# Patient Record
Sex: MALE | Race: WHITE | Employment: OTHER | URBAN - METROPOLITAN AREA
[De-identification: names, ages, dates, MRNs, and addresses within clinical notes are randomized per-mention and may not be internally consistent; named-entity substitution may affect disease eponyms.]

---

## 2017-06-17 ENCOUNTER — ANESTHESIA EVENT (OUTPATIENT)
Dept: SURGERY | Age: 67
End: 2017-06-17
Payer: MEDICARE

## 2017-06-19 ENCOUNTER — ANESTHESIA (OUTPATIENT)
Dept: SURGERY | Age: 67
End: 2017-06-19
Payer: MEDICARE

## 2017-06-19 ENCOUNTER — HOSPITAL ENCOUNTER (OUTPATIENT)
Age: 67
Setting detail: OUTPATIENT SURGERY
Discharge: HOME | End: 2017-06-19
Attending: PODIATRIST | Admitting: PODIATRIST
Payer: MEDICARE

## 2017-06-19 VITALS
TEMPERATURE: 98 F | HEIGHT: 70 IN | DIASTOLIC BLOOD PRESSURE: 80 MMHG | BODY MASS INDEX: 35.93 KG/M2 | HEART RATE: 53 BPM | WEIGHT: 251 LBS | SYSTOLIC BLOOD PRESSURE: 161 MMHG | RESPIRATION RATE: 16 BRPM | OXYGEN SATURATION: 98 %

## 2017-06-19 PROCEDURE — 76060000034 HC ANESTHESIA 1.5 TO 2 HR: Performed by: PODIATRIST

## 2017-06-19 PROCEDURE — 77030020754 HC CUF TRNQT 2BLA STRY -B: Performed by: PODIATRIST

## 2017-06-19 PROCEDURE — 77030031139 HC SUT VCRL2 J&J -A: Performed by: PODIATRIST

## 2017-06-19 PROCEDURE — 76210000016 HC OR PH I REC 1 TO 1.5 HR: Performed by: PODIATRIST

## 2017-06-19 PROCEDURE — 77030018836 HC SOL IRR NACL ICUM -A: Performed by: PODIATRIST

## 2017-06-19 PROCEDURE — 77030002916 HC SUT ETHLN J&J -A: Performed by: PODIATRIST

## 2017-06-19 PROCEDURE — 74011250636 HC RX REV CODE- 250/636

## 2017-06-19 PROCEDURE — 77030006773 HC BLD SAW OSC BRSM -A: Performed by: PODIATRIST

## 2017-06-19 PROCEDURE — 77030011640 HC PAD GRND REM COVD -A: Performed by: PODIATRIST

## 2017-06-19 PROCEDURE — 76010000153 HC OR TIME 1.5 TO 2 HR: Performed by: PODIATRIST

## 2017-06-19 PROCEDURE — 74011000250 HC RX REV CODE- 250: Performed by: PODIATRIST

## 2017-06-19 PROCEDURE — 76210000020 HC REC RM PH II FIRST 0.5 HR: Performed by: PODIATRIST

## 2017-06-19 PROCEDURE — 77030020269 HC MISC IMPL: Performed by: PODIATRIST

## 2017-06-19 PROCEDURE — 77030006784 HC BLD SAW OSC MCRA -B: Performed by: PODIATRIST

## 2017-06-19 RX ORDER — CEFAZOLIN SODIUM IN 0.9 % NACL 2 G/100 ML
PLASTIC BAG, INJECTION (ML) INTRAVENOUS AS NEEDED
Status: DISCONTINUED | OUTPATIENT
Start: 2017-06-19 | End: 2017-06-19 | Stop reason: HOSPADM

## 2017-06-19 RX ORDER — SODIUM CHLORIDE 9 MG/ML
25 INJECTION, SOLUTION INTRAVENOUS CONTINUOUS
Status: DISCONTINUED | OUTPATIENT
Start: 2017-06-19 | End: 2017-06-19 | Stop reason: HOSPADM

## 2017-06-19 RX ORDER — SODIUM CHLORIDE 0.9 % (FLUSH) 0.9 %
5-10 SYRINGE (ML) INJECTION AS NEEDED
Status: DISCONTINUED | OUTPATIENT
Start: 2017-06-19 | End: 2017-06-19 | Stop reason: HOSPADM

## 2017-06-19 RX ORDER — PROPOFOL 10 MG/ML
INJECTION, EMULSION INTRAVENOUS AS NEEDED
Status: DISCONTINUED | OUTPATIENT
Start: 2017-06-19 | End: 2017-06-19 | Stop reason: HOSPADM

## 2017-06-19 RX ORDER — FENTANYL CITRATE 50 UG/ML
INJECTION, SOLUTION INTRAMUSCULAR; INTRAVENOUS AS NEEDED
Status: DISCONTINUED | OUTPATIENT
Start: 2017-06-19 | End: 2017-06-19 | Stop reason: HOSPADM

## 2017-06-19 RX ORDER — KETOROLAC TROMETHAMINE 30 MG/ML
INJECTION, SOLUTION INTRAMUSCULAR; INTRAVENOUS AS NEEDED
Status: DISCONTINUED | OUTPATIENT
Start: 2017-06-19 | End: 2017-06-19 | Stop reason: HOSPADM

## 2017-06-19 RX ORDER — MIDAZOLAM HYDROCHLORIDE 1 MG/ML
0.5 INJECTION, SOLUTION INTRAMUSCULAR; INTRAVENOUS
Status: DISCONTINUED | OUTPATIENT
Start: 2017-06-19 | End: 2017-06-19 | Stop reason: HOSPADM

## 2017-06-19 RX ORDER — SODIUM CHLORIDE 0.9 % (FLUSH) 0.9 %
5-10 SYRINGE (ML) INJECTION EVERY 8 HOURS
Status: DISCONTINUED | OUTPATIENT
Start: 2017-06-19 | End: 2017-06-19 | Stop reason: HOSPADM

## 2017-06-19 RX ORDER — MIDAZOLAM HYDROCHLORIDE 1 MG/ML
INJECTION, SOLUTION INTRAMUSCULAR; INTRAVENOUS AS NEEDED
Status: DISCONTINUED | OUTPATIENT
Start: 2017-06-19 | End: 2017-06-19 | Stop reason: HOSPADM

## 2017-06-19 RX ORDER — CEFAZOLIN SODIUM IN 0.9 % NACL 2 G/50 ML
2 INTRAVENOUS SOLUTION, PIGGYBACK (ML) INTRAVENOUS ONCE
Status: DISCONTINUED | OUTPATIENT
Start: 2017-06-19 | End: 2017-06-19 | Stop reason: HOSPADM

## 2017-06-19 RX ORDER — MIDAZOLAM HYDROCHLORIDE 1 MG/ML
1 INJECTION, SOLUTION INTRAMUSCULAR; INTRAVENOUS AS NEEDED
Status: DISCONTINUED | OUTPATIENT
Start: 2017-06-19 | End: 2017-06-19 | Stop reason: HOSPADM

## 2017-06-19 RX ORDER — FENTANYL CITRATE 50 UG/ML
50 INJECTION, SOLUTION INTRAMUSCULAR; INTRAVENOUS AS NEEDED
Status: DISCONTINUED | OUTPATIENT
Start: 2017-06-19 | End: 2017-06-19 | Stop reason: HOSPADM

## 2017-06-19 RX ORDER — SODIUM CHLORIDE 9 MG/ML
1000 INJECTION, SOLUTION INTRAVENOUS CONTINUOUS
Status: DISCONTINUED | OUTPATIENT
Start: 2017-06-19 | End: 2017-06-19 | Stop reason: HOSPADM

## 2017-06-19 RX ORDER — SODIUM CHLORIDE, SODIUM LACTATE, POTASSIUM CHLORIDE, CALCIUM CHLORIDE 600; 310; 30; 20 MG/100ML; MG/100ML; MG/100ML; MG/100ML
25 INJECTION, SOLUTION INTRAVENOUS CONTINUOUS
Status: DISCONTINUED | OUTPATIENT
Start: 2017-06-19 | End: 2017-06-19 | Stop reason: HOSPADM

## 2017-06-19 RX ORDER — DEXTROSE, SODIUM CHLORIDE, SODIUM LACTATE, POTASSIUM CHLORIDE, AND CALCIUM CHLORIDE 5; .6; .31; .03; .02 G/100ML; G/100ML; G/100ML; G/100ML; G/100ML
25 INJECTION, SOLUTION INTRAVENOUS CONTINUOUS
Status: DISCONTINUED | OUTPATIENT
Start: 2017-06-19 | End: 2017-06-19 | Stop reason: HOSPADM

## 2017-06-19 RX ORDER — FENTANYL CITRATE 50 UG/ML
25 INJECTION, SOLUTION INTRAMUSCULAR; INTRAVENOUS
Status: DISCONTINUED | OUTPATIENT
Start: 2017-06-19 | End: 2017-06-19 | Stop reason: HOSPADM

## 2017-06-19 RX ORDER — SODIUM CHLORIDE, SODIUM LACTATE, POTASSIUM CHLORIDE, CALCIUM CHLORIDE 600; 310; 30; 20 MG/100ML; MG/100ML; MG/100ML; MG/100ML
INJECTION, SOLUTION INTRAVENOUS
Status: DISCONTINUED | OUTPATIENT
Start: 2017-06-19 | End: 2017-06-19 | Stop reason: HOSPADM

## 2017-06-19 RX ORDER — LIDOCAINE HYDROCHLORIDE 10 MG/ML
0.1 INJECTION, SOLUTION EPIDURAL; INFILTRATION; INTRACAUDAL; PERINEURAL AS NEEDED
Status: DISCONTINUED | OUTPATIENT
Start: 2017-06-19 | End: 2017-06-19 | Stop reason: HOSPADM

## 2017-06-19 RX ORDER — PROPOFOL 10 MG/ML
INJECTION, EMULSION INTRAVENOUS
Status: DISCONTINUED | OUTPATIENT
Start: 2017-06-19 | End: 2017-06-19 | Stop reason: HOSPADM

## 2017-06-19 RX ORDER — ONDANSETRON 2 MG/ML
4 INJECTION INTRAMUSCULAR; INTRAVENOUS AS NEEDED
Status: DISCONTINUED | OUTPATIENT
Start: 2017-06-19 | End: 2017-06-19 | Stop reason: HOSPADM

## 2017-06-19 RX ORDER — DIPHENHYDRAMINE HYDROCHLORIDE 50 MG/ML
12.5 INJECTION, SOLUTION INTRAMUSCULAR; INTRAVENOUS AS NEEDED
Status: DISCONTINUED | OUTPATIENT
Start: 2017-06-19 | End: 2017-06-19 | Stop reason: HOSPADM

## 2017-06-19 RX ORDER — HYDROMORPHONE HYDROCHLORIDE 1 MG/ML
0.2 INJECTION, SOLUTION INTRAMUSCULAR; INTRAVENOUS; SUBCUTANEOUS
Status: DISCONTINUED | OUTPATIENT
Start: 2017-06-19 | End: 2017-06-19 | Stop reason: HOSPADM

## 2017-06-19 RX ORDER — MORPHINE SULFATE 10 MG/ML
2 INJECTION, SOLUTION INTRAMUSCULAR; INTRAVENOUS
Status: DISCONTINUED | OUTPATIENT
Start: 2017-06-19 | End: 2017-06-19 | Stop reason: HOSPADM

## 2017-06-19 RX ADMIN — PROPOFOL 20 MG: 10 INJECTION, EMULSION INTRAVENOUS at 10:08

## 2017-06-19 RX ADMIN — PROPOFOL 10 MG: 10 INJECTION, EMULSION INTRAVENOUS at 10:20

## 2017-06-19 RX ADMIN — PROPOFOL 10 MG: 10 INJECTION, EMULSION INTRAVENOUS at 10:27

## 2017-06-19 RX ADMIN — PROPOFOL 10 MG: 10 INJECTION, EMULSION INTRAVENOUS at 10:12

## 2017-06-19 RX ADMIN — SODIUM CHLORIDE, SODIUM LACTATE, POTASSIUM CHLORIDE, CALCIUM CHLORIDE: 600; 310; 30; 20 INJECTION, SOLUTION INTRAVENOUS at 09:45

## 2017-06-19 RX ADMIN — PROPOFOL 40 MCG/KG/MIN: 10 INJECTION, EMULSION INTRAVENOUS at 10:11

## 2017-06-19 RX ADMIN — MIDAZOLAM HYDROCHLORIDE 1 MG: 1 INJECTION, SOLUTION INTRAMUSCULAR; INTRAVENOUS at 10:22

## 2017-06-19 RX ADMIN — FENTANYL CITRATE 50 MCG: 50 INJECTION, SOLUTION INTRAMUSCULAR; INTRAVENOUS at 10:03

## 2017-06-19 RX ADMIN — PROPOFOL 10 MG: 10 INJECTION, EMULSION INTRAVENOUS at 10:30

## 2017-06-19 RX ADMIN — PROPOFOL 10 MG: 10 INJECTION, EMULSION INTRAVENOUS at 10:16

## 2017-06-19 RX ADMIN — Medication 2 G: at 10:14

## 2017-06-19 RX ADMIN — MIDAZOLAM HYDROCHLORIDE 2 MG: 1 INJECTION, SOLUTION INTRAMUSCULAR; INTRAVENOUS at 10:03

## 2017-06-19 RX ADMIN — PROPOFOL 10 MG: 10 INJECTION, EMULSION INTRAVENOUS at 10:22

## 2017-06-19 RX ADMIN — PROPOFOL 10 MG: 10 INJECTION, EMULSION INTRAVENOUS at 10:25

## 2017-06-19 RX ADMIN — KETOROLAC TROMETHAMINE 30 MG: 30 INJECTION, SOLUTION INTRAMUSCULAR; INTRAVENOUS at 11:17

## 2017-06-19 RX ADMIN — MIDAZOLAM HYDROCHLORIDE 0.5 MG: 1 INJECTION, SOLUTION INTRAMUSCULAR; INTRAVENOUS at 10:46

## 2017-06-19 RX ADMIN — FENTANYL CITRATE 25 MCG: 50 INJECTION, SOLUTION INTRAMUSCULAR; INTRAVENOUS at 10:46

## 2017-06-19 NOTE — DISCHARGE SUMMARY
1. Rest, Ice, Elevate Right Foot. 2. Dispense surgical shoe and wear while ambulating right foot  3. Complete Range of Motion Exercises at the right 1st digit by moving the digit up and down 3 times per day for 5 minutes  4. Leave dressing dry, clean and intact Right Foot, contact Dr. Markus Rosa if the dressing becomes wet  5. Weight bear as tolerated in surgical shoe right foot. 6. Follow all instructions on handout provided by Dr. Markus Rosa.

## 2017-06-19 NOTE — DISCHARGE INSTRUCTIONS
1. Rest, Ice, Elevate Right Foot. 2. Dispense surgical shoe and wear while ambulating right foot  3. Complete Range of Motion Exercises at the right 1st digit by moving the digit up and down 3 times per day for 5 minutes  4. Leave dressing dry, clean and intact Right Foot, contact Dr. Loretta Robertson if the dressing becomes wet  5. Weight bear as tolerated in surgical shoe right foot. 6. Follow all instructions on handout provided by Dr. Loretta Robertson.                      After general anesthesia or intravenous sedation, for 24 hours or while taking prescription Narcotics:  · Limit your activities  · Do not drive and operate hazardous machinery  · Do not make important personal or business decisions  · Do  not drink alcoholic beverages  · If you have not urinated within 8 hours after discharge, please contact your surgeon on call.     Report the following to your surgeon:  · Excessive pain, swelling, redness or odor of or around the surgical area  · Temperature over 100.5  · Nausea and vomiting lasting longer than 4 hours or if unable to take medications  · Any signs of decreased circulation or nerve impairment to extremity: change in color, persistent  numbness, tingling, coldness or increase pain  · Any questions

## 2017-06-19 NOTE — H&P
Surgery History and Physcial    Subjective:      Jacqueline Branch is a 73 y.o. male who presents for evaluation of right foot pain. He has failed conservative therapy including shoe gear modification, injections, NSAIDs, ect. He presents for surgical correction    Patient Active Problem List    Diagnosis Date Noted    Bilateral groin pain 04/19/2016     Past Medical History:   Diagnosis Date    Arrhythmia 2006    ATRIAL FIBRILLATION    Arthritis     Bilateral groin pain 4/19/2016    GERD (gastroesophageal reflux disease)     Hiatal hernia     Hypertension     Ill-defined condition     KIDNEY STONE    Joint pain     Multiple stiff joints     Muscle ache     Sinus problem     Thyroid disease       Past Surgical History:   Procedure Laterality Date    COLONOSCOPY N/A 10/4/2016    COLONOSCOPY performed by [facility]ee [facility] at [facility] ENDOSCOPY    HX APPENDECTOMY      HX CATARACT REMOVAL      BILATERAL CATARACT REMOVAL    HX GI      ESOPHAGEAL DILITATION (SEVERAL)    HX HERNIA REPAIR Left     HX ORTHOPAEDIC      left wrist and shoulder, right knee and shoulder      Social History   Substance Use Topics    Smoking status: Never Smoker    Smokeless tobacco: Never Used    Alcohol use No      Family History   Problem Relation Age of Onset    Cancer Mother     Heart Disease Mother     Cancer Father     Diabetes Brother     Diabetes Brother       Prior to Admission medications    Medication Sig Start Date End Date Taking? Authorizing Provider   FLAXSEED OIL (OMEGA 3 PO) Take 1 Tab by mouth daily. Yes Historical Provider   amLODIPine (NORVASC) 10 mg tablet 10 mg nightly. 1/22/16  Yes Historical Provider   ibuprofen (MOTRIN) 800 mg tablet  4/13/16  Yes Historical Provider   SYNTHROID 175 mcg tablet 175 mcg daily. 4/6/16  Yes Historical Provider   TOPROL XL 50 mg XL tablet 50 mg daily. 1/22/16  Yes Historical Provider   pantoprazole (PROTONIX) 40 mg tablet 40 mg daily as needed.  3/11/16  Yes Historical Provider   CETIRIZINE HCL (ZYRTEC PO) Take 10 mg by mouth daily as needed. Yes Historical Provider   ASPIRIN (ASPIR-81 PO) Take  by mouth. Yes Historical Provider     Allergies   Allergen Reactions    Codeine Other (comments)         Review of Systems   Constitutional: Negative. Negative for activity change, appetite change, chills, diaphoresis, fatigue and fever. HENT: Negative. Negative for postnasal drip, sinus pressure, sneezing and sore throat. Eyes: Negative. Negative for pain, discharge, redness and itching. Respiratory: Negative. Negative for cough, choking, shortness of breath and wheezing. Cardiovascular: Negative. Negative for chest pain, palpitations and leg swelling. Endocrine: Negative. Genitourinary: Negative for decreased urine volume, discharge, flank pain, frequency and urgency. Musculoskeletal: Positive for arthralgias, joint swelling and neck pain. Allergic/Immunologic: Negative. Neurological: Negative. Negative for tremors, syncope, speech difficulty and weakness. Hematological: Negative. Negative for adenopathy. Does not bruise/bleed easily. Psychiatric/Behavioral: Negative. Negative for agitation, behavioral problems, confusion and hallucinations. Objective:     Visit Vitals    /90 (BP 1 Location: Left arm, BP Patient Position: At rest)    Pulse (!) 56    Temp 97.9 °F (36.6 °C)    Resp 16    Ht 5' 10\" (1.778 m)    Wt 113.9 kg (251 lb)    SpO2 97%    BMI 36.01 kg/m2       Physical Exam   Constitutional: He is oriented to person, place, and time. He appears well-developed and well-nourished. No distress. HENT:   Head: Normocephalic and atraumatic. Right Ear: External ear normal.   Left Ear: External ear normal.   Eyes: Conjunctivae and EOM are normal. Pupils are equal, round, and reactive to light. Right eye exhibits no discharge. Left eye exhibits no discharge. No scleral icterus. Neck: Normal range of motion. Neck supple.  No JVD present. No tracheal deviation present. No thyromegaly present. Cardiovascular: Normal rate, regular rhythm, normal heart sounds and intact distal pulses. Exam reveals no friction rub. Pulmonary/Chest: Effort normal and breath sounds normal.   Abdominal: Soft. Bowel sounds are normal.   Musculoskeletal: Normal range of motion. He exhibits tenderness and deformity. He exhibits no edema. Neurological: He is alert and oriented to person, place, and time. He has normal reflexes. He displays normal reflexes. No cranial nerve deficit. He exhibits normal muscle tone. Coordination normal.   Skin: Skin is warm and dry. No rash noted. He is not diaphoretic. No erythema. No pallor. Psychiatric: He has a normal mood and affect. His behavior is normal. Judgment and thought content normal.     Foot and Ankle Focused Exam  Vasc: DP and PT pulses palpable BLE. CFT less than 3 seconds BLE digits. Hair growth noted BLE. Skin temp warm to warm from proximal to distal BLE. Neuro: Protective sensation intact at 10/10 sites with monofilament. Vibratory sensation intact at the level of the hallux. Proprioception intact at the 1st MTP BLE. No ankle clonus BLE. Sharp/Dull sensation intact BLE. Derm: No open lesions. Webspaces 1-4 dry clean intact BLE. Increased erythema along the medial and dorsal 1st MTP right foot. Ortho: Range of motion decreased at the level of the 1st MTP with ROM of the hallux on the 1st metatarsal right foot. Pain on palpation and crepitus at the 1st MTP right. Muscle strenght 5/5 for all lower extremity muscle groups    Imaging:  images and reports reviewed    Lab Review:  No results found for this or any previous visit (from the past 24 hour(s)). Assessment:     1. Hallux Rigidus, Right foot  2. HAV, Right Foot  3. Secondary OA, Right foot  4. Pain right foot. Plan:     1.  I recommend proceeding with chilectomy of the 1st metatarsal right foot with application of 1st MTP joint replacement. 2. Discussed aspects of surgical intervention, methods, risks including by not limited to infection, bleeding, hematoma, and perforation of the intestines or solid organs, and the risks of general anesthetic. The patient understands the risks; any and all questions were answered to the patient's satisfaction.     Signed By: Jaswinder Rodriguez DPM     June 19, 2017

## 2017-06-19 NOTE — BRIEF OP NOTE
BRIEF OPERATIVE NOTE    Date of Procedure: 6/19/2017   Preoperative Diagnosis: HALLUX RIGIDIUS RIGHT FOOT  Postoperative Diagnosis: HALLUX RIGIDIUS RIGHT FOOT    Procedure(s):  HALLUX RIGIDUS CORRECTION WITH CHEILECTOMY AND INSERTION OF IMPLANT RIGHT FOOT   Surgeon(s) and Role:     * Sebastien Jackson DPM - Primary         Assistant Staff:       Surgical Staff:  Circ-1: Moriah Murrieta RN  Scrub RN-1: Elgin Lechuga RN  Scrub RN-Relief: Samy Huerta  Scrub RN - Intern: Nathalie Maldonado RN  Event Time In   Incision Start 1033   Incision Close 1125     Anesthesia: Other   Estimated Blood Loss: 10mL  Specimens: * No specimens in log *   Findings: Patient tolerated procedure and anesthesia well. Transport from OR to PACU with VSS and NVSI to both feet. Complications: None  Implants:   Implant Name Type Inv.  Item Serial No.  Lot No. LRB No. Used Action   1ST MPJ IMPLANT W/ FABIOLA     N/A   [id number] Right 1 Implanted

## 2017-06-19 NOTE — ANESTHESIA POSTPROCEDURE EVALUATION
Post-Anesthesia Evaluation and Assessment    Patient: Karla Mittal MRN: [id number]  SSN: [id number]    YOB: 1950  Age: 73 y.o. Sex: male       Cardiovascular Function/Vital Signs  Visit Vitals    /78    Pulse (!) 50    Temp 36.6 °C (97.9 °F)    Resp 14    Ht 5' 10\" (1.778 m)    Wt 113.9 kg (251 lb)    SpO2 97%    BMI 36.01 kg/m2       Patient is status post MAC anesthesia for Procedure(s):  HALLUX RIGIDUS CORRECTION WITH CHEILECTOMY AND INSERTION OF IMPLANT RIGHT FOOT . Nausea/Vomiting: None    Postoperative hydration reviewed and adequate. Pain:  Pain Scale 1: Numeric (0 - 10) (06/19/17 0923)  Pain Intensity 1: 6 (06/19/17 0923)   Managed    Neurological Status:   Neuro (WDL): Within Defined Limits (06/19/17 0930)   At baseline    Mental Status and Level of Consciousness: Arousable    Pulmonary Status:   O2 Device: Nasal cannula (06/19/17 1135)   Adequate oxygenation and airway patent    Complications related to anesthesia: None    Post-anesthesia assessment completed.  No concerns    Signed By: Miriam Banerjee MD     June 19, 2017

## 2017-06-19 NOTE — ANESTHESIA PREPROCEDURE EVALUATION
Anesthetic History   No history of anesthetic complications            Review of Systems / Medical History  Patient summary reviewed, nursing notes reviewed and pertinent labs reviewed    Pulmonary  Within defined limits                 Neuro/Psych   Within defined limits           Cardiovascular    Hypertension        Dysrhythmias : atrial fibrillation           GI/Hepatic/Renal     GERD           Endo/Other      Hypothyroidism  Obesity     Other Findings            Physical Exam    Airway  Mallampati: III  TM Distance: 4 - 6 cm  Neck ROM: normal range of motion   Mouth opening: Normal     Cardiovascular  Regular rate and rhythm,  S1 and S2 normal,  no murmur, click, rub, or gallop             Dental  No notable dental hx       Pulmonary  Breath sounds clear to auscultation               Abdominal  GI exam deferred       Other Findings            Anesthetic Plan    ASA: 3  Anesthesia type: MAC - ankle block          Induction: Intravenous  Anesthetic plan and risks discussed with: Patient

## 2017-06-24 NOTE — OP NOTES
[address] [facility] [address] [place] [address], [address]   OP NOTE       Name:  Katja Mina   MR#:  [id number]   :  1950   Account #:  [de-identified]    Surgery Date:  2017   Date of Adm:  2017       SURGEON: Mika Eng. Bishop Mikey III, LAST    ASSISTANT: None. PREOPERATIVE DIAGNOSES    1. Hallux abductovalgus deformity of the right foot. 2. Hallux rigidus deformity of the right foot. POSTOPERATIVE DIAGNOSES    1. Hallux abductovalgus deformity of the right foot. 2. Hallux rigidus deformity of the right foot. PROCEDURES PERFORMED: Correction of hallux abductovalgus   and hallux rigidus deformity of the right foot with a first MPJ   arthroplasty silicone implant. ANESTHESIA: MAC local sedation with a total of 20 mL of 50:50 mix   1% lidocaine plain and 0.5% Marcaine plain utilized about the surgical   site. HEMOSTASIS: Pneumatic ankle tourniquet utilized at 250 mmHg for a   total of 57 minutes. MATERIALS: Consisted of an [id number] Reference Toe total silicone   joint implant size 4, 3-0 Vicryl and 3-0 nylon. INJECTABLES: None. SPECIMENS REMOVED/PATHOLOGY: None. COMPLICATIONS: None. CONDITION AFTER PROCEDURE: Stable. ESTIMATED BLOOD LOSS:      INDICATIONS FOR PROCEDURE: This patient is well-known to me   on an outpatient basis, who has undergone multiple conservative   treatments for hallux rigidus and hallux abductovalgus including   shoe gear modification, padding, anti-inflammatories, injections, etc.   The patient has elected for surgical intervention. The procedures were   explained in detail. All questions answered. No guarantees were made. Preoperative labs and H and P were reviewed. The patient was   cleared by the anesthesia department for surgical intervention. OPERATIVE TECHNIQUE: Under mild sedation, the patient was   brought into the operating room, placed on the operating table in   supine position.  Critical pause was taken to identify the correct patient,   correct surgical site, and the correct procedure. All were in agreement   in the room. Next, pneumatic ankle tourniquet was placed about the   patient's right ankle with Webril used as interspace between the skin   and the ankle tourniquet. Next, the right lower extremity was scrubbed,   prepped and draped in the usual aseptic manner. My attention was then directed to the patient's right first ray, where   utilizing the above-mentioned local anesthetic, I injected the above   local anesthetic in a Baez block fashion about the right foot. Once   local anesthesia was verified, I then utilized a #15 blade to make a 4   cm linear longitudinal incision dorsal to the first metatarsal head,   continuing over the metatarsophalangeal joint and the dorsal aspect of   the proximal phalanx of the right hallux. I continued my dissection with   a combination of sharp and blunt, paying close attention to retract or   cauterize all neurovascular structures as necessary. I then visualized   the extensor hallucis longus and brevis tendon. I dissected these   sharply and bluntly and retracted it laterally. I then performed a   longitudinal capsulotomy across metatarsophalangeal joint, freeing all   soft tissue structures from the head of the first metatarsal and neck as   well as the base of the proximal phalanx of the right hallux until   complete visualization of the metatarsal head and base of the proximal   phalanx was visualized. I then used a McGlamry elevator to free   additional soft tissue adhesions as well as free the sesamoid   apparatus. I then flushed the area with copious amounts of normal   sterile saline. I then utilizing a power oscillating saw to resect the distal   head just proximal to the cartilage of the first metatarsal head. I passed   this to the back table. Smoothed all jagged edges.  I then removed the   base of the proximal phalanx and passed to the back table, flushed the area with copious amounts of normal sterile saline. Next, I inserted a   K-wire, paying close attention to remain parallel to the metatarsal shaft   and perpendicular to the weightbearing surface. This was utilizing   intraoperative fluoroscopy, both AP and lateral views. Once the K-wire   was inserted, I then placed the reference total reamer across the K-  wire. I reamed to the stop level of the reamer. I then flushed the area   with copious amounts of normal sterile saline. I then inserted a K-wire   into the base of the proximal phalanx using intraoperative fluoroscopy   as guidance and AP and lateral views. I then placed the reamer across   the K-wire and reamed the proximal phalanx to the automatic stop. I   then flushed the area with copious amounts of normal sterile saline. I   then used the grommet guide and placed it into the distal aspect of the   first metatarsal and the grommet into the proximal aspect of the base   of the proximal phalanx, right hallux. I then placed the size 4 sides   within the joint to complete range of motion, checked radiographs AP   medial oblique and lateral views. Adequate range of motion was   presented. I then flushed the area with copious amounts of normal   sterile saline. I then placed the actual size 4 implant within the first   metatarsophalangeal joint. The range of motion was verified. Intraoperative fluoroscopy noted adequate seating, proper seating of   the grommets as well as proper dorsiflexion, plantarflexion of the first   metatarsophalangeal joint at the hallux level. I then reapproximated the   capsular tissues utilizing 3-0 Vicryl in a simple interrupted suture   technique. Skin and subcutaneous tissue reapproximated utilizing 3-0   Vicryl in simple interrupted suture technique. Skin incision   was reapproximated utilizing 3-0 nylon in a simple interrupted suture   technique.  Pneumatic ankle tourniquet deflated at this time with prompt   hyperemic response noted to all digits on the right lower extremity. I   then dressed the incision sites, Betadine gel, Adaptic, 4 x 4's, Kerlix   and placed the patient in a postoperative shoe. The patient tolerated the procedure and anesthesia well. The patient   was transported from the OR to the PACU with vital signs stable and   neurovascular status intact to both feet. Following a period of   postoperative monitoring, the patient will be discharged home with the   following written and oral instructions:   1. Rest, ice, elevate the right lower extremity. 2. The patient can weight bear as tolerated in surgical shoe on the right   lower extremity. 3. The patient will leave the dressing dry, clean and intact. If dressing   becomes wet or soiled, please contact me soon as possible. 4. The patient is being prescribed Augmentin 875 mg to be taken twice   daily for infection prophylaxis as well as Percocet 5/325 as needed for   postoperative discomfort. 5. The patient is to follow up with me within 1 week for followup care. Contact me sooner if any problems should arise.         Bre Jang, XOCHITL, LAST FARIAS   D:  06/23/2017   17:00   T:  06/23/2017   20:36   Job #:  [id number]

## 2017-11-27 ENCOUNTER — HOSPITAL ENCOUNTER (OUTPATIENT)
Dept: GENERAL RADIOLOGY | Age: 67
Discharge: HOME | End: 2017-11-27
Attending: SPECIALIST
Payer: MEDICARE

## 2017-11-27 DIAGNOSIS — M25.552 LEFT HIP PAIN: ICD-10-CM

## 2017-11-27 DIAGNOSIS — M16.12 ARTHRITIS OF LEFT HIP: ICD-10-CM

## 2017-11-27 PROCEDURE — 74011636320 HC RX REV CODE- 636/320: Performed by: RADIOLOGY

## 2017-11-27 PROCEDURE — 74011000250 HC RX REV CODE- 250: Performed by: RADIOLOGY

## 2017-11-27 PROCEDURE — 74011250636 HC RX REV CODE- 250/636: Performed by: RADIOLOGY

## 2017-11-27 PROCEDURE — 20610 DRAIN/INJ JOINT/BURSA W/O US: CPT

## 2017-11-27 RX ORDER — BUPIVACAINE HYDROCHLORIDE 5 MG/ML
5 INJECTION, SOLUTION EPIDURAL; INTRACAUDAL
Status: COMPLETED | OUTPATIENT
Start: 2017-11-27 | End: 2017-11-27

## 2017-11-27 RX ORDER — LIDOCAINE HYDROCHLORIDE 10 MG/ML
10 INJECTION INFILTRATION; PERINEURAL
Status: COMPLETED | OUTPATIENT
Start: 2017-11-27 | End: 2017-11-27

## 2017-11-27 RX ORDER — TRIAMCINOLONE ACETONIDE 40 MG/ML
40 INJECTION, SUSPENSION INTRA-ARTICULAR; INTRAMUSCULAR
Status: COMPLETED | OUTPATIENT
Start: 2017-11-27 | End: 2017-11-27

## 2017-11-27 RX ADMIN — IOHEXOL 20 ML: 180 INJECTION INTRAVENOUS at 13:14

## 2017-11-27 RX ADMIN — LIDOCAINE HYDROCHLORIDE 10 ML: 10 INJECTION, SOLUTION INFILTRATION; PERINEURAL at 13:15

## 2017-11-27 RX ADMIN — TRIAMCINOLONE ACETONIDE 40 MG: 40 INJECTION, SUSPENSION INTRA-ARTICULAR; INTRAMUSCULAR at 13:14

## 2017-11-27 RX ADMIN — BUPIVACAINE HYDROCHLORIDE 25 MG: 5 INJECTION, SOLUTION EPIDURAL; INTRACAUDAL at 13:15

## 2018-03-19 RX ORDER — METOPROLOL TARTRATE 50 MG/1
50 TABLET ORAL 2 TIMES DAILY
COMMUNITY
End: 2021-09-29

## 2018-03-20 ENCOUNTER — HOSPITAL ENCOUNTER (OUTPATIENT)
Age: 68
Setting detail: OUTPATIENT SURGERY
Discharge: HOME | End: 2018-03-20
Attending: INTERNAL MEDICINE | Admitting: INTERNAL MEDICINE
Payer: MEDICARE

## 2018-03-20 ENCOUNTER — ANESTHESIA EVENT (OUTPATIENT)
Dept: ENDOSCOPY | Age: 68
End: 2018-03-20
Payer: MEDICARE

## 2018-03-20 ENCOUNTER — ANESTHESIA (OUTPATIENT)
Dept: ENDOSCOPY | Age: 68
End: 2018-03-20
Payer: MEDICARE

## 2018-03-20 VITALS
BODY MASS INDEX: 36.22 KG/M2 | OXYGEN SATURATION: 97 % | TEMPERATURE: 98.4 F | SYSTOLIC BLOOD PRESSURE: 144 MMHG | RESPIRATION RATE: 13 BRPM | WEIGHT: 253 LBS | HEART RATE: 58 BPM | HEIGHT: 70 IN | DIASTOLIC BLOOD PRESSURE: 69 MMHG

## 2018-03-20 PROCEDURE — 76040000019: Performed by: INTERNAL MEDICINE

## 2018-03-20 PROCEDURE — 76060000031 HC ANESTHESIA FIRST 0.5 HR: Performed by: INTERNAL MEDICINE

## 2018-03-20 PROCEDURE — 74011000250 HC RX REV CODE- 250

## 2018-03-20 PROCEDURE — 74011250636 HC RX REV CODE- 250/636

## 2018-03-20 RX ORDER — LIDOCAINE HYDROCHLORIDE 20 MG/ML
INJECTION, SOLUTION EPIDURAL; INFILTRATION; INTRACAUDAL; PERINEURAL AS NEEDED
Status: DISCONTINUED | OUTPATIENT
Start: 2018-03-20 | End: 2018-03-20 | Stop reason: HOSPADM

## 2018-03-20 RX ORDER — EPINEPHRINE 0.1 MG/ML
1 INJECTION INTRACARDIAC; INTRAVENOUS
Status: DISCONTINUED | OUTPATIENT
Start: 2018-03-20 | End: 2018-03-20 | Stop reason: HOSPADM

## 2018-03-20 RX ORDER — DEXTROMETHORPHAN/PSEUDOEPHED 2.5-7.5/.8
1.2 DROPS ORAL
Status: DISCONTINUED | OUTPATIENT
Start: 2018-03-20 | End: 2018-03-20 | Stop reason: HOSPADM

## 2018-03-20 RX ORDER — SODIUM CHLORIDE 9 MG/ML
50 INJECTION, SOLUTION INTRAVENOUS CONTINUOUS
Status: DISCONTINUED | OUTPATIENT
Start: 2018-03-20 | End: 2018-03-20 | Stop reason: HOSPADM

## 2018-03-20 RX ORDER — SODIUM CHLORIDE 0.9 % (FLUSH) 0.9 %
5-10 SYRINGE (ML) INJECTION EVERY 8 HOURS
Status: DISCONTINUED | OUTPATIENT
Start: 2018-03-20 | End: 2018-03-20 | Stop reason: HOSPADM

## 2018-03-20 RX ORDER — FLUMAZENIL 0.1 MG/ML
0.2 INJECTION INTRAVENOUS
Status: DISCONTINUED | OUTPATIENT
Start: 2018-03-20 | End: 2018-03-20 | Stop reason: HOSPADM

## 2018-03-20 RX ORDER — SODIUM CHLORIDE 0.9 % (FLUSH) 0.9 %
5-10 SYRINGE (ML) INJECTION AS NEEDED
Status: DISCONTINUED | OUTPATIENT
Start: 2018-03-20 | End: 2018-03-20 | Stop reason: HOSPADM

## 2018-03-20 RX ORDER — PROPOFOL 10 MG/ML
INJECTION, EMULSION INTRAVENOUS AS NEEDED
Status: DISCONTINUED | OUTPATIENT
Start: 2018-03-20 | End: 2018-03-20 | Stop reason: HOSPADM

## 2018-03-20 RX ORDER — MIDAZOLAM HYDROCHLORIDE 1 MG/ML
.25-5 INJECTION, SOLUTION INTRAMUSCULAR; INTRAVENOUS
Status: DISCONTINUED | OUTPATIENT
Start: 2018-03-20 | End: 2018-03-20 | Stop reason: HOSPADM

## 2018-03-20 RX ORDER — NALOXONE HYDROCHLORIDE 0.4 MG/ML
0.4 INJECTION, SOLUTION INTRAMUSCULAR; INTRAVENOUS; SUBCUTANEOUS
Status: DISCONTINUED | OUTPATIENT
Start: 2018-03-20 | End: 2018-03-20 | Stop reason: HOSPADM

## 2018-03-20 RX ORDER — FENTANYL CITRATE 50 UG/ML
100 INJECTION, SOLUTION INTRAMUSCULAR; INTRAVENOUS
Status: DISCONTINUED | OUTPATIENT
Start: 2018-03-20 | End: 2018-03-20 | Stop reason: HOSPADM

## 2018-03-20 RX ORDER — ATROPINE SULFATE 0.1 MG/ML
0.5 INJECTION INTRAVENOUS
Status: DISCONTINUED | OUTPATIENT
Start: 2018-03-20 | End: 2018-03-20 | Stop reason: HOSPADM

## 2018-03-20 RX ORDER — SODIUM CHLORIDE 9 MG/ML
INJECTION, SOLUTION INTRAVENOUS
Status: DISCONTINUED | OUTPATIENT
Start: 2018-03-20 | End: 2018-03-20 | Stop reason: HOSPADM

## 2018-03-20 RX ADMIN — PROPOFOL 50 MG: 10 INJECTION, EMULSION INTRAVENOUS at 14:59

## 2018-03-20 RX ADMIN — PROPOFOL 50 MG: 10 INJECTION, EMULSION INTRAVENOUS at 15:01

## 2018-03-20 RX ADMIN — LIDOCAINE HYDROCHLORIDE 80 MG: 20 INJECTION, SOLUTION EPIDURAL; INFILTRATION; INTRACAUDAL; PERINEURAL at 15:10

## 2018-03-20 RX ADMIN — SODIUM CHLORIDE: 9 INJECTION, SOLUTION INTRAVENOUS at 15:01

## 2018-03-20 RX ADMIN — PROPOFOL 50 MG: 10 INJECTION, EMULSION INTRAVENOUS at 15:03

## 2018-03-20 RX ADMIN — LIDOCAINE HYDROCHLORIDE 80 MG: 20 INJECTION, SOLUTION EPIDURAL; INFILTRATION; INTRACAUDAL; PERINEURAL at 14:59

## 2018-03-20 NOTE — ANESTHESIA POSTPROCEDURE EVALUATION
Post-Anesthesia Evaluation and Assessment    Patient: Anita Weldon MRN: [id number]  SSN: [id number]    YOB: 1950  Age: 78 y.o. Sex: male       Cardiovascular Function/Vital Signs  Visit Vitals    /80    Pulse 80    Temp 36.9 °C (98.4 °F)    Resp 14    Ht 5' 10\" (1.778 m)    Wt 114.8 kg (253 lb)    SpO2 99%    BMI 36.3 kg/m2       Patient is status post MAC anesthesia for Procedure(s):  ESOPHAGOGASTRODUODENOSCOPY (EGD) WITH DIL   ESOPHAGEAL DILATION. Nausea/Vomiting: None    Postoperative hydration reviewed and adequate. Pain:  Pain Scale 1: Numeric (0 - 10) (03/20/18 1525)  Pain Intensity 1: 0 (03/20/18 1525)   Managed    Neurological Status: At baseline    Mental Status and Level of Consciousness: Arousable    Pulmonary Status:   O2 Device: Nasal cannula (03/20/18 1519)   Adequate oxygenation and airway patent    Complications related to anesthesia: None    Post-anesthesia assessment completed.  No concerns    Signed By: Candi Garber MD     March 20, 2018

## 2018-03-20 NOTE — ANESTHESIA PREPROCEDURE EVALUATION
Anesthetic History   No history of anesthetic complications            Review of Systems / Medical History  Patient summary reviewed, nursing notes reviewed and pertinent labs reviewed    Pulmonary  Within defined limits                 Neuro/Psych   Within defined limits           Cardiovascular    Hypertension        Dysrhythmias : atrial fibrillation           GI/Hepatic/Renal     GERD           Endo/Other      Hypothyroidism  Arthritis     Other Findings            Physical Exam    Airway  Mallampati: II  TM Distance: > 6 cm  Neck ROM: normal range of motion   Mouth opening: Normal     Cardiovascular  Regular rate and rhythm,  S1 and S2 normal,  no murmur, click, rub, or gallop             Dental  No notable dental hx       Pulmonary  Breath sounds clear to auscultation               Abdominal  GI exam deferred       Other Findings            Anesthetic Plan    ASA: 3  Anesthesia type: MAC          Induction: Intravenous  Anesthetic plan and risks discussed with: Patient

## 2018-03-20 NOTE — PROCEDURES
[address]                     :  Eugenia Randolph MD    Referring Provider: Javier Perez MD    Sedation:  MAC anesthesia Propofol    Prior to the procedure its objectives, risks, consequences and alternatives were discussed with the patient who then elected to proceed. The patient had the opportunity to ask questions and those questions were answered. A physical exam was performed. The heart, lungs, and mental status were examined prior to the procedure and found to be satisfactory for conscious sedation and for the procedure. Conscious sedation was initiated by the physician. Continuous pulse oximetry and blood pressure monitoring were used throughout the procedure. After appropriate pharyngeal anesthesia, the endoscope was passed into the esophagus without difficulty. The proximal esophagus is normal. In the distal esophagus there is a typical Schatzkis ring and Grade 1 esophagitis. The scope goes through without difficulty. The fundus, body, antrum, pylorus, bulb and postbulbar area are unremarkable. On slow withdrawal of the scope, no abnormality was noted. Retroflexion revealed a normal cardia and fundus. The scope was then withdrawn and he was dilated with a 50 and 54 [place] Jimenez dilator without complications and will be discharged later today. Specimen none    Complications: None. EBL:  None.     Eugenia Randolph MD  3/20/2018  3:19 PM

## 2018-03-20 NOTE — ROUTINE PROCESS
Jake Dempsey  1950  [id number]    Situation:  Verbal report received from: Thomas Porter  Procedure: Procedure(s):  ESOPHAGOGASTRODUODENOSCOPY (EGD) WITH DIL   ESOPHAGEAL DILATION    Background:    Preoperative diagnosis: DYSPHAGIA   Postoperative diagnosis: Schatzki's Ring  Reflux    :  Dr. Eloisa Chand  Assistant(s): Endoscopy Technician-1: Kalina Gold IV  Endoscopy RN-1: Fatimah Barnes RN    Specimens: * No specimens in log *  H. Pylori  no    Assessment:  Intra-procedure medications     Anesthesia gave intra-procedure sedation and medications, see anesthesia flow sheet yes    Intravenous fluids: NS@ KVO     Vital signs stable     Abdominal assessment: round and soft     Recommendation:  Discharge patient per MD order  Family or Friend   Permission to share finding with family or friend yes

## 2018-03-20 NOTE — H&P
[address]  174 [facility], [address] [place] [address] Frannie Her is a  78 y.o.  male who presents with recurrent dysphagia.  .        Past Medical History:   Diagnosis Date    Arrhythmia 2006    ATRIAL FIBRILLATION - when thyroid \"went out\" - no problem since thyroid is under control    Arthritis     Bilateral groin pain 4/19/2016    GERD (gastroesophageal reflux disease)     Hiatal hernia     Hypertension     Ill-defined condition     KIDNEY STONE    Joint pain     Multiple stiff joints     Muscle ache     Sinus problem     Thyroid disease     radiation tablet     Past Surgical History:   Procedure Laterality Date    COLONOSCOPY N/A 10/4/2016    COLONOSCOPY performed by Mildred Cabrera MD at [address] HX APPENDECTOMY      HX CATARACT REMOVAL      BILATERAL CATARACT REMOVAL    HX GI      ESOPHAGEAL DILITATION (SEVERAL)    HX HERNIA REPAIR Left     HX ORTHOPAEDIC      left wrist (bone taken out of thumb and a tendon transplanted) and left shoulder - bone spur removed, right knee - meniscus removed and right shoulder x 2- bone spurs x2 and removed part of collar bone     Allergies   Allergen Reactions    Codeine Other (comments)     Current Facility-Administered Medications   Medication Dose Route Frequency Provider Last Rate Last Dose    0.9% sodium chloride infusion  50 mL/hr IntraVENous CONTINUOUS Mildred Cabrera MD        sodium chloride (NS) flush 5-10 mL  5-10 mL IntraVENous Q8H Mildred Cabrera MD        sodium chloride (NS) flush 5-10 mL  5-10 mL IntraVENous PRN Mildred Cabrera MD        midazolam (VERSED) injection 0.25-5 mg  0.25-5 mg IntraVENous Claudia Cabrera MD        fentaNYL citrate (PF) injection 100 mcg  100 mcg IntraVENous Claudia Cabrera MD        naloxone [facility]) injection 0.4 mg  0.4 mg IntraVENous Claudia Cabrera MD        flumazenil (ROMAZICON) 0.1 mg/mL injection 0.2 mg  0.2 mg IntraVENous Claudia Verduzco MD        [facility]) 56PH/9.2NR oral drops 80 mg  1.2 mL Oral Claudia Verduzco MD        atropine injection 0.5 mg  0.5 mg IntraVENous ONCE PRN iLliana Verduzco MD        EPINEPHrine (ADRENALIN) 0.1 mg/mL syringe 1 mg  1 mg Endoscopically ONCE PRN Liliana Verduzco MD         Facility-Administered Medications Ordered in Other Encounters   Medication Dose Route Frequency Provider Last Rate Last Dose    0.9% sodium chloride infusion   IntraVENous CONTINUOUS Renee Aguero CRNA           Visit Vitals    /82    Pulse 62    Temp 98.4 °F (36.9 °C)    Resp 17    Ht 5' 10\" (1.778 m)    Wt 114.8 kg (253 lb)    SpO2 96%    BMI 36.3 kg/m2           PHYSICAL EXAM:  General: WD, WN. Alert, cooperative, no acute distress    HEENT: NC, Atraumatic. PERRLA, EOMI. Anicteric sclerae. Mallampati score 2  Lungs:  CTA Bilaterally. No Wheezing/Rhonchi/Rales. Heart:  Regular  rhythm,  No murmur (), No Rubs, No Gallops  Abdomen: Soft, Non distended, Non tender.  +Bowel sounds, no HSM  Extremities: No c/c/e  Neurologic:  CN 2-12 gi, Alert and oriented X 3. No acute neurological distress   Psych:   Good insight. Not anxious nor agitated. Plan:   Endoscopic procedure with MAC.     Liliana Verduzco MD  3/20/2018  3:05 PM

## 2018-03-20 NOTE — IP AVS SNAPSHOT
[address] 
[phone] Patient: Georges Freeman MRN: [id number] About your hospitalization You were admitted on:  2018 You last received care in the:  [address] [place] [address] ENDOSCOPY You were discharged on:  2018 Why you were hospitalized Your primary diagnosis was:  Not on File Follow-up Information None Discharge Orders None A check rell indicates which time of day the medication should be taken. My Medications CONTINUE taking these medications Instructions Each Dose to Equal  
 Morning Noon Evening Bedtime  
 amLODIPine 10 mg tablet Commonly known as:  Erin [place] Your last dose was: Your next dose is: Take 10 mg by mouth nightly. 10 mg  
    
   
   
   
  
 ASPIR-81 PO Your last dose was: Your next dose is: Take 81 mg by mouth daily. 81 mg  
    
   
   
   
  
 ibuprofen 800 mg tablet Commonly known as:  MOTRIN Your last dose was: Your next dose is: Take 800 mg by mouth two (2) times daily as needed. 800 mg  
    
   
   
   
  
 metoprolol tartrate 50 mg tablet Commonly known as:  LOPRESSOR Your last dose was: Your next dose is: Take 50 mg by mouth two (2) times a day. 50 mg  
    
   
   
   
  
 pantoprazole 40 mg tablet Commonly known as:  PROTONIX Your last dose was: Your next dose is: Take 40 mg by mouth daily as needed. 40 mg SYNTHROID 175 mcg tablet Generic drug:  levothyroxine Your last dose was: Your next dose is: Take 175 mcg by mouth daily. Takes at 2am.  
 175 mcg ZYRTEC PO Your last dose was: Your next dose is: Take 10 mg by mouth daily as needed.   
 10 mg  
    
   
   
   
  
  
  
  
 Discharge Instructions [address] S[place] 
[id number] 
1950 EGD DISCHARGE INSTRUCTIONS Discomfort: 
Sore throat- throat lozenges or warm salt water gargle 
redness at IV site- apply warm compress to area; if redness or soreness persist- contact your physician Gaseous discomfort- walking, belching will help relieve any discomfort You may not operate a vehicle for 12 hours You may not engage in an occupation involving machinery or appliances for rest of today You may not drink alcoholic beverages for at least 12 hours Avoid making any critical decisions for at least 24 hour DIET You may have anything by mouth- do not eat or drink for two hours. You may eat and drink after you leave. You may resume your regular diet  however -  remember your colon is empty and a heavy meal will produce gas. Avoid these foods:  vegetables, fried / greasy foods, carbonated drinks ACTIVITY You may resume your normal daily activities Spend the remainder of the day resting -  avoid any strenuous activity. CALL M.D. ANY SIGN OF Increasing pain, nausea, vomiting Abdominal distension (swelling) New increased bleeding (oral or rectal) Fever (chills) Pain in chest area Bloody discharge from nose or mouth Shortness of breath Follow-up Instructions: 
 Call Harrie Phoenix for any questions or problems. Telephone # [phone] Continue same medications. Impression:  Schatzki's Ring Reflux Hilda Trivedi MD 
3/20/2018  3:21 PM 
 
 
  
  
  
Introducing [facility]! [facility] introduces [employer] patient portal. Now you can access parts of your medical record, email your doctor's office, and request medication refills online. 1. In your internet browser, go to [email or web address] 2. Click on the First Time User? Click Here link in the Sign In box.  You will see the New Member Sign Up page. 3. Enter your [employer] Access Code exactly as it appears below. You will not need to use this code after youve completed the sign-up process. If you do not sign up before the expiration date, you must request a new code. · [employer] Access Code: [email or web address] Expires: 6/18/2018  3:30 PM 
 
4. Enter the last four digits of your Social Security Number (xxxx) and Date of Birth (mm/dd/yyyy) as indicated and click Submit. You will be taken to the next sign-up page. 5. Create a [employer] ID. This will be your [employer] login ID and cannot be changed, so think of one that is secure and easy to remember. 6. Create a [employer] password. You can change your password at any time. 7. Enter your Password Reset Question and Answer. This can be used at a later time if you forget your password. 8. Enter your e-mail address. You will receive e-mail notification when new information is available in [address] 9. Click Sign Up. You can now view and download portions of your medical record. 10. Click the Download Summary menu link to download a portable copy of your medical information. If you have questions, please visit the Frequently Asked Questions section of the [employer] website. Remember, [employer] is NOT to be used for urgent needs. For medical emergencies, dial 911. Now available from your iPhone and Android! Providers Seen During Your Hospitalization Provider Specialty Primary office phone Chiqui Mcneil MD Gastroenterology [phone] Your Primary Care Physician (PCP) Primary Care Physician Office Phone Office Fax Jole Ashby [phone] You are allergic to the following Allergen Reactions Codeine Other (comments) Recent Documentation Height Weight BMI Smoking Status 1.778 m 114.8 kg 36.3 kg/m2 Never Smoker Emergency Contacts Name Discharge Info Relation Home Work Mobile [address] CAREGIVER [3] Spouse [3] [phone] Patient Belongings The following personal items are in your possession at time of discharge: 
  Dental Appliances: None  Visual Aid: None Please provide this summary of care documentation to your next provider. Signatures-by signing, you are acknowledging that this After Visit Summary has been reviewed with you and you have received a copy. Patient Signature:  ____________________________________________________________ Date:  ____________________________________________________________  
  
Mary Keto Provider Signature:  ____________________________________________________________ Date:  ____________________________________________________________

## 2018-03-20 NOTE — DISCHARGE INSTRUCTIONS
[address]  500 [facility]  [id number]  1950    EGD DISCHARGE INSTRUCTIONS    Discomfort:  Sore throat- throat lozenges or warm salt water gargle  redness at IV site- apply warm compress to area; if redness or soreness persist- contact your physician  Gaseous discomfort- walking, belching will help relieve any discomfort  You may not operate a vehicle for 12 hours  You may not engage in an occupation involving machinery or appliances for rest of today  You may not drink alcoholic beverages for at least 12 hours  Avoid making any critical decisions for at least 24 hour  DIET  You may have anything by mouth- do not eat or drink for two hours. You may eat and drink after you leave. You may resume your regular diet - however -  remember your colon is empty and a heavy meal will produce gas. Avoid these foods:  vegetables, fried / greasy foods, carbonated drinks        ACTIVITY  You may resume your normal daily activities   Spend the remainder of the day resting -  avoid any strenuous activity. CALL M.D. ANY SIGN OF   Increasing pain, nausea, vomiting  Abdominal distension (swelling)  New increased bleeding (oral or rectal)  Fever (chills)  Pain in chest area  Bloody discharge from nose or mouth  Shortness of breath    Follow-up Instructions:   Call Yojana Chan for any questions or problems. Telephone # [phone]   Continue same medications.     Impression:  Schatzki's Ring  Reflux    Anu Landrum MD  3/20/2018  3:21 PM

## 2019-03-29 ENCOUNTER — HOSPITAL ENCOUNTER (OUTPATIENT)
Dept: GENERAL RADIOLOGY | Age: 69
Discharge: HOME | End: 2019-03-29
Attending: SPECIALIST
Payer: MEDICARE

## 2019-03-29 DIAGNOSIS — M16.12 PRIMARY OSTEOARTHRITIS OF LEFT HIP: ICD-10-CM

## 2019-03-29 PROCEDURE — 74011000250 HC RX REV CODE- 250: Performed by: RADIOLOGY

## 2019-03-29 PROCEDURE — 74011250636 HC RX REV CODE- 250/636

## 2019-03-29 PROCEDURE — 74011250636 HC RX REV CODE- 250/636: Performed by: RADIOLOGY

## 2019-03-29 PROCEDURE — 74011636320 HC RX REV CODE- 636/320: Performed by: RADIOLOGY

## 2019-03-29 PROCEDURE — 20610 DRAIN/INJ JOINT/BURSA W/O US: CPT

## 2019-03-29 RX ORDER — TRIAMCINOLONE ACETONIDE 40 MG/ML
40 INJECTION, SUSPENSION INTRA-ARTICULAR; INTRAMUSCULAR
Status: COMPLETED | OUTPATIENT
Start: 2019-03-29 | End: 2019-03-29

## 2019-03-29 RX ORDER — BUPIVACAINE HYDROCHLORIDE 5 MG/ML
5 INJECTION, SOLUTION EPIDURAL; INTRACAUDAL
Status: COMPLETED | OUTPATIENT
Start: 2019-03-29 | End: 2019-03-29

## 2019-03-29 RX ORDER — LIDOCAINE HYDROCHLORIDE 10 MG/ML
INJECTION, SOLUTION EPIDURAL; INFILTRATION; INTRACAUDAL; PERINEURAL
Status: COMPLETED
Start: 2019-03-29 | End: 2019-03-29

## 2019-03-29 RX ADMIN — TRIAMCINOLONE ACETONIDE 40 MG: 40 INJECTION, SUSPENSION INTRA-ARTICULAR; INTRAMUSCULAR at 13:29

## 2019-03-29 RX ADMIN — BUPIVACAINE HYDROCHLORIDE 25 MG: 5 INJECTION, SOLUTION EPIDURAL; INTRACAUDAL; PERINEURAL at 13:28

## 2019-03-29 RX ADMIN — IOHEXOL 20 ML: 180 INJECTION INTRAVENOUS at 13:28

## 2019-03-29 RX ADMIN — LIDOCAINE HYDROCHLORIDE 30 ML: 10 INJECTION, SOLUTION EPIDURAL; INFILTRATION; INTRACAUDAL; PERINEURAL at 13:28

## 2021-09-29 RX ORDER — NEBIVOLOL 10 MG/1
10 TABLET ORAL DAILY
COMMUNITY

## 2021-10-01 ENCOUNTER — TRANSCRIBE ORDER (OUTPATIENT)
Dept: REGISTRATION | Age: 71
End: 2021-10-01

## 2021-10-01 ENCOUNTER — HOSPITAL ENCOUNTER (OUTPATIENT)
Dept: PREADMISSION TESTING | Age: 71
Discharge: HOME | End: 2021-10-01
Payer: MEDICARE

## 2021-10-01 DIAGNOSIS — Z01.812 PRE-PROCEDURE LAB EXAM: Primary | ICD-10-CM

## 2021-10-01 DIAGNOSIS — Z01.812 PRE-PROCEDURE LAB EXAM: ICD-10-CM

## 2021-10-01 PROCEDURE — U0005 INFEC AGEN DETEC AMPLI PROBE: HCPCS

## 2021-10-03 LAB
SARS-COV-2, XPLCVT: NOT DETECTED
SOURCE, COVRS: NORMAL

## 2021-10-06 ENCOUNTER — ANESTHESIA (OUTPATIENT)
Dept: ENDOSCOPY | Age: 71
End: 2021-10-06
Payer: MEDICARE

## 2021-10-06 ENCOUNTER — ANESTHESIA EVENT (OUTPATIENT)
Dept: ENDOSCOPY | Age: 71
End: 2021-10-06
Payer: MEDICARE

## 2021-10-06 ENCOUNTER — HOSPITAL ENCOUNTER (OUTPATIENT)
Age: 71
Setting detail: OUTPATIENT SURGERY
Discharge: HOME | End: 2021-10-06
Attending: INTERNAL MEDICINE | Admitting: INTERNAL MEDICINE
Payer: MEDICARE

## 2021-10-06 VITALS
OXYGEN SATURATION: 96 % | DIASTOLIC BLOOD PRESSURE: 78 MMHG | HEART RATE: 58 BPM | TEMPERATURE: 97.8 F | HEIGHT: 70 IN | BODY MASS INDEX: 36.3 KG/M2 | SYSTOLIC BLOOD PRESSURE: 134 MMHG | RESPIRATION RATE: 14 BRPM

## 2021-10-06 PROCEDURE — 77030013996 HC SNR POLYP ENDOSC OCOA -B: Performed by: INTERNAL MEDICINE

## 2021-10-06 PROCEDURE — 88305 TISSUE EXAM BY PATHOLOGIST: CPT

## 2021-10-06 PROCEDURE — 74011250636 HC RX REV CODE- 250/636: Performed by: NURSE ANESTHETIST, CERTIFIED REGISTERED

## 2021-10-06 PROCEDURE — C1713 ANCHOR/SCREW BN/BN,TIS/BN: HCPCS | Performed by: INTERNAL MEDICINE

## 2021-10-06 PROCEDURE — 76060000032 HC ANESTHESIA 0.5 TO 1 HR: Performed by: INTERNAL MEDICINE

## 2021-10-06 PROCEDURE — 74011000250 HC RX REV CODE- 250: Performed by: NURSE ANESTHETIST, CERTIFIED REGISTERED

## 2021-10-06 PROCEDURE — 74011000250 HC RX REV CODE- 250: Performed by: ANESTHESIOLOGY

## 2021-10-06 PROCEDURE — 77030040684 HC NDL ENDOSC NEEDLEMASTR OCOA -B: Performed by: INTERNAL MEDICINE

## 2021-10-06 PROCEDURE — 77030013992 HC SNR POLYP ENDOSC BSC -B: Performed by: INTERNAL MEDICINE

## 2021-10-06 PROCEDURE — 76040000007: Performed by: INTERNAL MEDICINE

## 2021-10-06 PROCEDURE — 2709999900 HC NON-CHARGEABLE SUPPLY: Performed by: INTERNAL MEDICINE

## 2021-10-06 RX ORDER — NALOXONE HYDROCHLORIDE 0.4 MG/ML
0.4 INJECTION, SOLUTION INTRAMUSCULAR; INTRAVENOUS; SUBCUTANEOUS
Status: DISCONTINUED | OUTPATIENT
Start: 2021-10-06 | End: 2021-10-06 | Stop reason: HOSPADM

## 2021-10-06 RX ORDER — SODIUM CHLORIDE 9 MG/ML
INJECTION, SOLUTION INTRAVENOUS
Status: DISCONTINUED | OUTPATIENT
Start: 2021-10-06 | End: 2021-10-06 | Stop reason: HOSPADM

## 2021-10-06 RX ORDER — FLUMAZENIL 0.1 MG/ML
0.2 INJECTION INTRAVENOUS
Status: DISCONTINUED | OUTPATIENT
Start: 2021-10-06 | End: 2021-10-06 | Stop reason: HOSPADM

## 2021-10-06 RX ORDER — ATROPINE SULFATE 0.1 MG/ML
0.5 INJECTION INTRAVENOUS
Status: DISCONTINUED | OUTPATIENT
Start: 2021-10-06 | End: 2021-10-06 | Stop reason: HOSPADM

## 2021-10-06 RX ORDER — SODIUM CHLORIDE 0.9 % (FLUSH) 0.9 %
5-40 SYRINGE (ML) INJECTION EVERY 8 HOURS
Status: DISCONTINUED | OUTPATIENT
Start: 2021-10-06 | End: 2021-10-06 | Stop reason: HOSPADM

## 2021-10-06 RX ORDER — SODIUM CHLORIDE 9 MG/ML
75 INJECTION, SOLUTION INTRAVENOUS CONTINUOUS
Status: DISCONTINUED | OUTPATIENT
Start: 2021-10-06 | End: 2021-10-06 | Stop reason: HOSPADM

## 2021-10-06 RX ORDER — LIDOCAINE HYDROCHLORIDE 20 MG/ML
INJECTION, SOLUTION EPIDURAL; INFILTRATION; INTRACAUDAL; PERINEURAL AS NEEDED
Status: DISCONTINUED | OUTPATIENT
Start: 2021-10-06 | End: 2021-10-06 | Stop reason: HOSPADM

## 2021-10-06 RX ORDER — SODIUM CHLORIDE 0.9 % (FLUSH) 0.9 %
5-40 SYRINGE (ML) INJECTION AS NEEDED
Status: DISCONTINUED | OUTPATIENT
Start: 2021-10-06 | End: 2021-10-06 | Stop reason: HOSPADM

## 2021-10-06 RX ORDER — GLYCOPYRROLATE 0.2 MG/ML
INJECTION INTRAMUSCULAR; INTRAVENOUS AS NEEDED
Status: DISCONTINUED | OUTPATIENT
Start: 2021-10-06 | End: 2021-10-06 | Stop reason: HOSPADM

## 2021-10-06 RX ORDER — MIDAZOLAM HYDROCHLORIDE 1 MG/ML
.25-5 INJECTION, SOLUTION INTRAMUSCULAR; INTRAVENOUS
Status: DISCONTINUED | OUTPATIENT
Start: 2021-10-06 | End: 2021-10-06 | Stop reason: HOSPADM

## 2021-10-06 RX ORDER — DEXTROMETHORPHAN/PSEUDOEPHED 2.5-7.5/.8
1.2 DROPS ORAL
Status: DISCONTINUED | OUTPATIENT
Start: 2021-10-06 | End: 2021-10-06 | Stop reason: HOSPADM

## 2021-10-06 RX ORDER — FENTANYL CITRATE 50 UG/ML
12.5-2 INJECTION, SOLUTION INTRAMUSCULAR; INTRAVENOUS
Status: DISCONTINUED | OUTPATIENT
Start: 2021-10-06 | End: 2021-10-06 | Stop reason: HOSPADM

## 2021-10-06 RX ORDER — EPINEPHRINE 0.1 MG/ML
1 INJECTION INTRACARDIAC; INTRAVENOUS
Status: DISCONTINUED | OUTPATIENT
Start: 2021-10-06 | End: 2021-10-06 | Stop reason: HOSPADM

## 2021-10-06 RX ORDER — PROPOFOL 10 MG/ML
INJECTION, EMULSION INTRAVENOUS AS NEEDED
Status: DISCONTINUED | OUTPATIENT
Start: 2021-10-06 | End: 2021-10-06 | Stop reason: HOSPADM

## 2021-10-06 RX ADMIN — PROPOFOL 25 MG: 10 INJECTION, EMULSION INTRAVENOUS at 11:09

## 2021-10-06 RX ADMIN — PROPOFOL 25 MG: 10 INJECTION, EMULSION INTRAVENOUS at 11:31

## 2021-10-06 RX ADMIN — PROPOFOL 25 MG: 10 INJECTION, EMULSION INTRAVENOUS at 11:04

## 2021-10-06 RX ADMIN — PROPOFOL 25 MG: 10 INJECTION, EMULSION INTRAVENOUS at 11:00

## 2021-10-06 RX ADMIN — SODIUM CHLORIDE: 900 INJECTION, SOLUTION INTRAVENOUS at 10:30

## 2021-10-06 RX ADMIN — PROPOFOL 25 MG: 10 INJECTION, EMULSION INTRAVENOUS at 11:22

## 2021-10-06 RX ADMIN — PROPOFOL 25 MG: 10 INJECTION, EMULSION INTRAVENOUS at 11:36

## 2021-10-06 RX ADMIN — PROPOFOL 25 MG: 10 INJECTION, EMULSION INTRAVENOUS at 11:06

## 2021-10-06 RX ADMIN — PROPOFOL 25 MG: 10 INJECTION, EMULSION INTRAVENOUS at 11:19

## 2021-10-06 RX ADMIN — PROPOFOL 50 MG: 10 INJECTION, EMULSION INTRAVENOUS at 10:57

## 2021-10-06 RX ADMIN — PROPOFOL 25 MG: 10 INJECTION, EMULSION INTRAVENOUS at 11:16

## 2021-10-06 RX ADMIN — LIDOCAINE HYDROCHLORIDE 60 MG: 20 INJECTION, SOLUTION EPIDURAL; INFILTRATION; INTRACAUDAL; PERINEURAL at 10:57

## 2021-10-06 RX ADMIN — GLYCOPYRROLATE 0.1 MG: 0.2 INJECTION, SOLUTION INTRAMUSCULAR; INTRAVENOUS at 11:17

## 2021-10-06 RX ADMIN — GLYCOPYRROLATE 0.1 MG: 0.2 INJECTION, SOLUTION INTRAMUSCULAR; INTRAVENOUS at 11:20

## 2021-10-06 RX ADMIN — PROPOFOL 25 MG: 10 INJECTION, EMULSION INTRAVENOUS at 11:02

## 2021-10-06 RX ADMIN — PROPOFOL 50 MG: 10 INJECTION, EMULSION INTRAVENOUS at 10:58

## 2021-10-06 RX ADMIN — PROPOFOL 50 MG: 10 INJECTION, EMULSION INTRAVENOUS at 10:59

## 2021-10-06 RX ADMIN — PROPOFOL 25 MG: 10 INJECTION, EMULSION INTRAVENOUS at 11:26

## 2021-10-06 RX ADMIN — SODIUM CHLORIDE: 900 INJECTION, SOLUTION INTRAVENOUS at 11:30

## 2021-10-06 RX ADMIN — PROPOFOL 25 MG: 10 INJECTION, EMULSION INTRAVENOUS at 11:12

## 2021-10-06 NOTE — DISCHARGE INSTRUCTIONS
[address]  [address] [facility], [address] [place] [address]          Jesús Galindo  [id number]  1950    COLON DISCHARGE INSTRUCTIONS    DISCOMFORT:  Redness at IV site- apply warm compress to area; if redness or soreness persist- contact your physician  There may be a slight amount of blood passed from the rectum  Gaseous discomfort- walking, belching will help relieve any discomfort    DIET:   High Fiber diet. ACTIVITY:  You may resume your normal daily activities it is recommended that you spend the remainder of the day resting -  avoid any strenuous activity. You may not operate a vehicle for 12 hours  You may not engage in an occupation involving machinery or appliances for rest of today  You may not drink alcoholic beverages for at least 12 hours  Avoid making any critical decisions for at least 24 hour    CALL M.D. ANY SIGN OF:   Increasing pain, nausea, vomiting  Abdominal distension (swelling)  New increased bleeding (oral or rectal)  Fever (chills)  Pain in chest area  Bloody discharge from nose or mouth  Shortness of breath     Follow-up Instructions:   Call Dr. Sabino Uribe for any questions or problems. Telephone # [phone]  Biopsy results will be available in  5 to 7 days    Impression:  -Total five polyps found in the cecum and ascending colon (9-25 mm in size). Removed and sent for pathology. -Mild sigmoid colon diverticulosis and moderate internal hemorrhoids. Recommendations:   -Resume normal medication(s). -No Non-Steroidal Anti Inflammatorys (NSAIDS) (ibuprofen etc) for at least 10 days.   -Begin fiber supplement daily.    -High fiber diet.  -Await pathology.  -Follow up in the office in 6-8 weeks regarding abdominal pain. -Repeat colonoscopy in 6 months. Sabino Uribe MD    Patient Education        Nonsteroidal Anti-Inflammatory Drugs (NSAIDs):  Care Instructions  Your Care Instructions     Nonsteroidal anti-inflammatory drugs (NSAIDs) relieve pain and fever. You also can use them to reduce swelling and inflammation. Over-the-counter NSAIDs include:  · Ibuprofen (Advil, Motrin). · Naproxen (Aleve). Aspirin (Viktor, Bufferin) is also an NSAID. But it doesn't work the same way as these other NSAIDs. Prescription NSAIDs include:  · Diclofenac (Voltaren). · Indomethacin (Indocin). · Piroxicam (Feldene). What should you know about NSAIDs? · Do not use an over-the-counter NSAID for longer than 10 days. Talk to your doctor first.  · The most common side effects from NSAIDs are stomachaches, heartburn, and nausea. NSAIDs may irritate the stomach lining. If the medicine upsets your stomach, you can try taking it with food. But if that doesn't help, talk with your doctor to make sure it's not a more serious problem, such as a stomach ulcer or bleeding in the stomach or intestines. · Using NSAIDs may:  ? Lead to high blood pressure. ? Make symptoms of heart failure worse. ? Raise the risk of heart attack, stroke, kidney damage, and skin reactions. · Your risks are greater if you take NSAIDs at higher doses or for longer than the label says. People who are older than 72 or who have heart, stomach, or intestinal disease have a higher risk for problems. Aspirin  Aspirin is not like other NSAIDs. It can help people who are at high risk for heart attack or stroke. But taking aspirin isn't right for everyone, because it can cause serious bleeding. Talk to your doctor before you start taking aspirin every day. You and your doctor can decide if aspirin is a good choice for you based on your risk of a heart attack or stroke and your risk of serious bleeding. Unless you have a high risk of a heart attack or stroke, the benefits of aspirin probably won't outweigh the risk of bleeding. · If you use other NSAIDs a lot, aspirin may not work as well to prevent heart attack and stroke.   · If you take aspirin every day for your heart, talk with your doctor before you take other NSAIDs. · Do not give aspirin to anyone younger than 20. It has been linked to Reye syndrome, a serious illness. When should you call for help? Call 911 anytime you think you may need emergency care. For example, call if:    · You passed out (lost consciousness).     · You vomit blood or what looks like coffee grounds.     · You pass maroon or very bloody stools. Call your doctor now or seek immediate medical care if:    · Your stools are black and tarlike or have streaks of blood. Watch closely for changes in your health, and be sure to contact your doctor if you have any problems. Where can you learn more? Go to http://www.gray.com/  Enter A328 in the search box to learn more about \"Nonsteroidal Anti-Inflammatory Drugs (NSAIDs): Care Instructions. \"  Current as of: April 8, 2021               Content Version: 13.0  © 2006-2021 [employer]. Care instructions adapted under license by [employer] (which disclaims liability or warranty for this information). If you have questions about a medical condition or this instruction, always ask your healthcare professional. [place] [address] any warranty or liability for your use of this information. High-Fiber Diet: Care Instructions  Overview     A high-fiber diet may help you relieve constipation and feel less bloated. Your doctor and dietitian will help you make a high-fiber eating plan based on your personal needs. The plan will include the things you like to eat. It will also make sure that you get 25 to 35 grams of fiber a day. Before you make changes to the way you eat, be sure to talk with your doctor or dietitian. Follow-up care is a key part of your treatment and safety. Be sure to make and go to all appointments, and call your doctor if you are having problems. It's also a good idea to know your test results and keep a list of the medicines you take.   How can you care for yourself at home? · You can increase how much fiber you get if you eat more of certain foods. These foods include:  ? Whole-grain breads and cereals. ? Fruits, such as pears, apples, and peaches. Eat the skins and peels if you can.  ? Vegetables, such as broccoli, cabbage, spinach, carrots, asparagus, and squash. ? Starchy vegetables. These include potatoes with skins, kidney beans, and lima beans. · Take a fiber supplement every day if your doctor recommends it. Examples are Benefiber, Citrucel, FiberCon, and Metamucil. Ask your doctor how much to take. · Drink plenty of fluids. If you have kidney, heart, or liver disease and have to limit fluids, talk with your doctor before you increase the amount of fluids you drink. Where can you learn more? Go to http://www.gray.com/  Enter E961 in the search box to learn more about \"High-Fiber Diet: Care Instructions. \"  Current as of: December 17, 2020               Content Version: 13.0  © 9013-9309 [employer]. Care instructions adapted under license by [employer] (which disclaims liability or warranty for this information). If you have questions about a medical condition or this instruction, always ask your healthcare professional. [place] [address] any warranty or liability for your use of this information. Hemorrhoids: Care Instructions  Overview     Hemorrhoids are swollen veins that develop in the anal canal. Bleeding during bowel movements, itching, and rectal pain are the most common symptoms. Hemorrhoids can be uncomfortable at times, but rarely are they a serious problem. Most of the time, you can treat them with simple changes to your diet and bowel habits. These changes include eating more fiber and not straining to pass stools. Most hemorrhoids don't need surgery or other treatment unless they are very large and painful or bleed a lot.   Follow-up care is a key part of your treatment and safety. Be sure to make and go to all appointments, and call your doctor if you are having problems. It's also a good idea to know your test results and keep a list of the medicines you take. How can you care for yourself at home? · Sit in a few inches of warm water (sitz bath) 3 times a day and after bowel movements. The warm water helps with pain and itching. · Put ice on your anal area several times a day for 10 minutes at a time. Put a thin cloth between the ice and your skin. Follow this by placing a warm, wet towel on the area for another 10 to 20 minutes. · Take pain medicines exactly as directed. ? If the doctor gave you a prescription medicine for pain, take it as prescribed. ? If you are not taking a prescription pain medicine, ask your doctor if you can take an over-the-counter medicine. · Keep the anal area clean, but be gentle. Use water and a fragrance-free soap, or use baby wipes or medicated pads such as Tucks. · Wear cotton underwear and loose clothing to decrease moisture in the anal area. · Eat more fiber. Include foods such as whole-grain breads and cereals, raw vegetables, raw and dried fruits, and beans. · Drink plenty of fluids. If you have kidney, heart, or liver disease and have to limit fluids, talk with your doctor before you increase the amount of fluids you drink. · Use a stool softener that contains bran or psyllium. You can save money by buying bran or psyllium (available in bulk at most health food stores) and sprinkling it on foods or stirring it into fruit juice. Or you can use a product such as Metamucil or Hydrocil. · Practice healthy bowel habits. ? Go to the bathroom as soon as you have the urge. ? Avoid straining to pass stools. Relax and give yourself time to let things happen naturally. ? Do not hold your breath while passing stools. ? Do not read while sitting on the toilet. Get off the toilet as soon as you have finished.   · Take your medicines exactly as prescribed. Call your doctor if you think you are having a problem with your medicine. When should you call for help? Call 911 anytime you think you may need emergency care. For example, call if:    · You pass maroon or very bloody stools. Call your doctor now or seek immediate medical care if:    · You have increased pain.     · You have increased bleeding. Watch closely for changes in your health, and be sure to contact your doctor if:    · Your symptoms have not improved after 3 or 4 days. Where can you learn more? Go to http://www.herndon.com/  Enter F228 in the search box to learn more about \"Hemorrhoids: Care Instructions. \"  Current as of: February 10, 2021               Content Version: 13.0  © 2006-2021 [employer]. Care instructions adapted under license by [employer] (which disclaims liability or warranty for this information). If you have questions about a medical condition or this instruction, always ask your healthcare professional. [place] [address] any warranty or liability for your use of this information. Learning About Diverticulosis and Diverticulitis  What are diverticulosis and diverticulitis? In diverticulosis and diverticulitis, pouches called diverticula form in the wall of the large intestine, or colon. · In diverticulosis, the pouches do not cause any pain or other symptoms. · In diverticulitis, the pouches get inflamed or infected and cause symptoms. Doctors aren't sure what causes these pouches in the colon. But they think that a low-fiber diet may play a role. Without fiber to add bulk to the stool, the colon has to work harder than normal to push the stool forward. The pressure from this may cause pouches to form in weak spots along the colon. Some people with diverticulosis get diverticulitis. But experts don't know why this happens. What are the symptoms?   · In diverticulosis, most people don't have symptoms. But pouches sometimes bleed. · In diverticulitis, symptoms may last from a few hours to a week or more. They include:  ? Belly pain. This is usually in the lower left side. It is sometimes worse when you move. This is the most common symptom. ? Fever and chills. ? Bloating and gas. ? Diarrhea or constipation. ? Nausea and sometimes vomiting.  ? Not feeling like eating. How can you prevent diverticulitis? You may be able to lower your chance of getting diverticulitis. You can do this by taking steps to prevent constipation. · Eat fruits, vegetables, beans, and whole grains every day. These foods are high in fiber. · Drink plenty of fluids. If you have kidney, heart, or liver disease and have to limit fluids, talk with your doctor before you increase the amount of fluids you drink. · Get at least 30 minutes of exercise on most days of the week. Walking is a good choice. You also may want to do other activities, such as running, swimming, cycling, or playing tennis or team sports. · Take a fiber supplement, such as Citrucel or Metamucil, every day if needed. Read and follow all instructions on the label. · Schedule time each day for a bowel movement. Having a daily routine may help. Take your time and do not strain when having a bowel movement. Some people avoid nuts, seeds, berries, and popcorn. They believe that these foods might get trapped in the diverticula and cause pain. But there is no proof that these foods cause diverticulitis or make it worse. How are these problems treated? · The best way to treat diverticulosis is to avoid constipation. · Treatment for diverticulitis includes antibiotics. It often includes a change in your diet. You may need only liquids at first. Your doctor may suggest pain medicines for pain or belly cramps. In some cases, surgery may be needed. Follow-up care is a key part of your treatment and safety.  Be sure to make and go to all appointments, and call your doctor if you are having problems. It's also a good idea to know your test results and keep a list of the medicines you take. Where can you learn more? Go to http://www.gray.com/  Enter C020 in the search box to learn more about \"Learning About Diverticulosis and Diverticulitis. \"  Current as of: February 10, 2021               Content Version: 13.0  © 2006-2021 [employer]. Care instructions adapted under license by [employer] (which disclaims liability or warranty for this information). If you have questions about a medical condition or this instruction, always ask your healthcare professional. [address] any warranty or liability for your use of this information. Colon Polyps: Care Instructions  Your Care Instructions     Colon polyps are growths in the colon or the rectum. The cause of most colon polyps is not known, and most people who get them do not have any problems. But a certain kind can turn into cancer. For this reason, regular testing for colon polyps is important for people as they get older. It is also important for anyone who has an increased risk for colon cancer. Polyps are usually found through routine colon cancer screening tests. Although most colon polyps are not cancerous, they are usually removed and then tested for cancer. Screening for colon cancer saves lives because the cancer can usually be cured if it is caught early. If you have a polyp that is the type that can turn into cancer, you may need more tests to examine your entire colon. The doctor will remove any other polyps that he or she finds, and you will be tested more often. Follow-up care is a key part of your treatment and safety. Be sure to make and go to all appointments, and call your doctor if you are having problems.  It's also a good idea to know your test results and keep a list of the medicines you take.  How can you care for yourself at home? Regular exams to look for colon polyps are the best way to prevent polyps from turning into colon cancer. These can include stool tests, sigmoidoscopy, colonoscopy, and CT colonography. Talk with your doctor about a testing schedule that is right for you. To prevent polyps  There is no home treatment that can prevent colon polyps. But these steps may help lower your risk for cancer. · Stay active. Being active can help you get to and stay at a healthy weight. Try to exercise on most days of the week. Walking is a good choice. · Eat well. Choose a variety of vegetables, fruits, legumes (such as peas and beans), fish, poultry, and whole grains. · Do not smoke. If you need help quitting, talk to your doctor about stop-smoking programs and medicines. These can increase your chances of quitting for good. · If you drink alcohol, limit how much you drink. Limit alcohol to 2 drinks a day for men and 1 drink a day for women. When should you call for help? Call your doctor now or seek immediate medical care if:    · You have severe belly pain.     · Your stools are maroon or very bloody. Watch closely for changes in your health, and be sure to contact your doctor if:    · You have a fever.     · You have nausea or vomiting.     · You have a change in bowel habits (new constipation or diarrhea).     · Your symptoms get worse or are not improving as expected. Where can you learn more? Go to http://www.herndon.com/  Enter C571 in the search box to learn more about \"Colon Polyps: Care Instructions. \"  Current as of: December 17, 2020               Content Version: 13.0  © 1023-7089 [employer]. Care instructions adapted under license by [employer] (which disclaims liability or warranty for this information).  If you have questions about a medical condition or this instruction, always ask your healthcare professional. [employer], [facility] disclaims any warranty or liability for your use of this information. Learning About Coronavirus [phone])  Coronavirus [phone]): Overview  What is coronavirus (COVID-19)? The coronavirus disease (COVID-19) is caused by a virus. It is an illness that was first found in [place], [place], in December 2019. It has since spread worldwide. The virus can cause fever, cough, and trouble breathing. In severe cases, it can cause pneumonia and make it hard to breathe without help. It can cause death. Coronaviruses are a large group of viruses. They cause the common cold. They also cause more serious illnesses like Middle East respiratory syndrome (MERS) and severe acute respiratory syndrome (SARS). COVID-19 is caused by a novel coronavirus. That means it's a new type that has not been seen in people before. This virus spreads person-to-person through droplets from coughing and sneezing. It can also spread when you are close to someone who is infected. And it can spread when you touch something that has the virus on it, such as a doorknob or a tabletop. What can you do to protect yourself from coronavirus (COVID-19)? The best way to protect yourself from getting sick is to:  · Avoid areas where there is an outbreak. · Avoid contact with people who may be infected. · Wash your hands often with soap or alcohol-based hand sanitizers. · Avoid crowds and try to stay at least 6 feet away from other people. · Wash your hands often, especially after you cough or sneeze. Use soap and water, and scrub for at least 20 seconds. If soap and water aren't available, use an alcohol-based hand . · Avoid touching your mouth, nose, and eyes. What can you do to avoid spreading the virus to others? To help avoid spreading the virus to others:  · Cover your mouth with a tissue when you cough or sneeze. Then throw the tissue in the trash.   · Use a disinfectant to clean things that you touch often.  · Stay home if you are sick or have been exposed to the virus. Don't go to school, work, or public areas. And don't use public transportation. · If you are sick:  ? Leave your home only if you need to get medical care. But call the doctor's office first so they know you're coming. And wear a face mask, if you have one.  ? If you have a face mask, wear it whenever you're around other people. It can help stop the spread of the virus when you cough or sneeze. ? Clean and disinfect your home every day. Use household  and disinfectant wipes or sprays. Take special care to clean things that you grab with your hands. These include doorknobs, remote controls, phones, and handles on your refrigerator and microwave. And don't forget countertops, tabletops, bathrooms, and computer keyboards. When to call for help  Call 911 anytime you think you may need emergency care. For example, call if:  · You have severe trouble breathing. (You can't talk at all.)  · You have constant chest pain or pressure. · You are severely dizzy or lightheaded. · You are confused or can't think clearly. · Your face and lips have a blue color. · You pass out (lose consciousness) or are very hard to wake up. Call your doctor now if you develop symptoms such as:  · Shortness of breath. · Fever. · Cough. If you need to get care, call ahead to the doctor's office for instructions before you go. Make sure you wear a face mask, if you have one, to prevent exposing other people to the virus. Where can you get the latest information? The following health organizations are tracking and studying this virus. Their websites contain the most up-to-date information. [place] also learn what to do if you think you may have been exposed to the virus. · U.S. Centers for Disease Control and Prevention (CDC): The CDC provides updated news about the disease and travel advice. The website also tells you how to prevent the spread of infection. www.cdc.gov  · World Health Organization [facility]): WHO offers information about the virus outbreaks. WHO also has travel advice. www.who.int  Current as of: April 1, 2020               Content Version: 12.4  © 3991-8783 Healthwise, Incorporated. Care instructions adapted under license by your healthcare professional. If you have questions about a medical condition or this instruction, always ask your healthcare professional. [address] any warranty or liability for your use of this information.

## 2021-10-06 NOTE — ANESTHESIA PREPROCEDURE EVALUATION
Relevant Problems   No relevant active problems       Anesthetic History   No history of anesthetic complications            Review of Systems / Medical History  Patient summary reviewed, nursing notes reviewed and pertinent labs reviewed    Pulmonary  Within defined limits                 Neuro/Psych   Within defined limits           Cardiovascular    Hypertension        Dysrhythmias : atrial fibrillation           GI/Hepatic/Renal     GERD           Endo/Other        Arthritis     Other Findings              Physical Exam    Airway  Mallampati: II  TM Distance: > 6 cm  Neck ROM: normal range of motion   Mouth opening: Normal     Cardiovascular  Regular rate and rhythm,  S1 and S2 normal,  no murmur, click, rub, or gallop             Dental  No notable dental hx       Pulmonary  Breath sounds clear to auscultation               Abdominal  GI exam deferred       Other Findings            Anesthetic Plan    ASA: 3  Anesthesia type: MAC            Anesthetic plan and risks discussed with: Patient

## 2021-10-06 NOTE — ANESTHESIA POSTPROCEDURE EVALUATION
Post-Anesthesia Evaluation and Assessment    Patient: Sina Salguero MRN: [id number]  SSN: [id number]    YOB: 1950  Age: 80 y.o. Sex: male      I have evaluated the patient and they are stable and ready for discharge from the PACU. Cardiovascular Function/Vital Signs  Visit Vitals  /78   Pulse (!) 58   Temp 36.6 °C (97.8 °F)   Resp 14   Ht 5' 10\" (1.778 m)   SpO2 96%   BMI 36.30 kg/m²       Patient is status post MAC anesthesia for Procedure(s):  COLONOSCOPY   :-  ENDOSCOPIC POLYPECTOMY  INJECTION. Nausea/Vomiting: None    Postoperative hydration reviewed and adequate. Pain:  Pain Scale 1: Numeric (0 - 10) (10/06/21 1205)  Pain Intensity 1: 0 (10/06/21 1205)   Managed    Neurological Status: At baseline    Mental Status, Level of Consciousness: Alert and  oriented to person, place, and time    Pulmonary Status:   O2 Device: None (Room air) (10/06/21 1205)   Adequate oxygenation and airway patent    Complications related to anesthesia: None    Post-anesthesia assessment completed. No concerns    Signed By: Avis Jha MD     October 6, 2021              Procedure(s):  COLONOSCOPY   :-  ENDOSCOPIC POLYPECTOMY  INJECTION. MAC    <BSHSIANPOST>    INITIAL Post-op Vital signs:   Vitals Value Taken Time   /78 10/06/21 1205   Temp 36.6 °C (97.8 °F) 10/06/21 1151   Pulse 56 10/06/21 1206   Resp 21 10/06/21 1206   SpO2 96 % 10/06/21 1205   Vitals shown include unvalidated device data.

## 2021-10-06 NOTE — PROGRESS NOTES
Watkins Myrna  1950  [id number]    Situation:  Verbal report received from: Veronica VELEZ  Procedure: Procedure(s):  COLONOSCOPY   :-  ENDOSCOPIC POLYPECTOMY  INJECTION    Background:    Preoperative diagnosis: SCREENING  Postoperative diagnosis: colon polyps    :  Dr. Isabelle Early  Assistant(s): Endoscopy Technician-1: Adi Narayanan  Endoscopy RN-1: Hershel Collet, RN    Specimens:   ID Type Source Tests Collected by Time Destination   1 : cecum colon polyps Preservative Cecum  Yang Everett MD 10/6/2021 1102 Pathology   2 : Ascending  colon polyp Preservative Colon, Ascending  Yang Everett MD 10/6/2021 1141 Pathology     H. Pylori  no    Assessment:      Anesthesia gave intra-procedure sedation and medications, see anesthesia flow sheet yes    Intravenous fluids: NS@ KVO     Vital signs stable     Abdominal assessment: round and soft     Recommendation:  Discharge patient per MD order.     Family   Permission to share finding with family or friend yes

## 2021-10-06 NOTE — PROCEDURES
[facility]  [address] [facility], [address] [place] [address]  [phone]               Colonoscopy Operative Report      Indications:  Screening for colorectal cancer, last colonoscopy 2016, repeat recommended in 5 yrs    :  Johanne Barrios MD    Staff: Endoscopy Technician-1: Shauna Ignacio  Endoscopy RN-1: Keturah Jenkins RN     Referring Provider: Pete Rader MD    Sedation:  MAC anesthesia    Procedure Details:  After informed consent was obtained with all risks and benefits of procedure explained and preoperative exam completed, the patient was taken to the endoscopy suite and placed in the left lateral decubitus position. Upon sequential sedation as per above, a digital rectal exam was performed  And was normal.  The Olympus videocolonoscope  was inserted in the rectum and carefully advanced to the cecum, which was identified by the ileocecal valve and appendiceal orifice. The quality of preparation was good. The colonoscope was slowly withdrawn with careful evaluation between folds. Retroflexion in the rectum was performed and was normal..     Findings:   Rectum: Grade 2 internal hemorrhoid(s); Sigmoid:     - Diverticulosis  Descending Colon: normal  Transverse Colon: normal  Ascending Colon: 2  Sessile polyp(s), the largest 9 mm in size;  Cecum: 3  Sessile polyp(s), the largest 25 mm in size; The larger (2.5 cm) polyp was found on the ileocecal valve. This was very flat. It was lifted with methylene blue but due to very floppy valve it was very difficult to snare despite trying stiff (round and hexagonal snares). Eventually it was removed in piecemeal and remaining few areas that could not be removed were cauterized.    Terminal Ileum: not intubated    Interventions:  1 piece meal and endoscopic mucosal resection polypectomy were performed using hot snare  and the polyps were  retrieved   3 complete polypectomy were performed using hot snare  and the polyps were retrieved    Specimen Removed:   ID Type Source Tests Collected by Time Destination   1 : cecum colon polyps Preservative Cecum  Nilton Madera MD 10/6/2021 1102 Pathology   2 : Ascending  colon polyp Preservative Colon, Ascending  Nilton Madera MD 10/6/2021 1141 Pathology       EBL:  Minimal    Complications:  None; patient tolerated the procedure well. Impression:  -Total five polyps found in the cecum and ascending colon (9-25 mm in size). Removed and sent for pathology. -Mild sigmoid colon diverticulosis and moderate internal hemorrhoids. Recommendations:   -Resume normal medication(s). -No Non-Steroidal Anti Inflammatorys (NSAIDS) (ibuprofen etc) for at least 10 days.   -Begin fiber supplement daily.    -High fiber diet.  -Await pathology.  -Follow up in the office in 6-8 weeks regarding abdominal pain. -Repeat colonoscopy in 6 months. Discharge Disposition:  Home in the company of a  when able to ambulate.     Molly Rodriguez MD  10/6/2021  11:48 AM

## 2021-10-06 NOTE — H&P
[address]  [address]  [phone]        History and Physical     NAME: Radha Baig   :  1950   MRN:  [id number]         HPI:  Radha Baig is a 80 y.o. male here for colonoscopy. Patient had normal colonoscopy  and repeat recommended in 5 yrs. No family h/o colon cancer. Past Surgical History:   Procedure Laterality Date    COLONOSCOPY N/A 10/4/2016    COLONOSCOPY performed by Rosemary Knowles MD at [facility] ENDOSCOPY    HX APPENDECTOMY      HX CATARACT REMOVAL      BILATERAL CATARACT REMOVAL    HX GI      ESOPHAGEAL DILITATION (SEVERAL)    HX HERNIA REPAIR Left     HX ORTHOPAEDIC      left wrist (bone taken out of thumb and a tendon transplanted) and left shoulder - bone spur removed, right knee - meniscus removed and right shoulder x 2- bone spurs x2 and removed part of collar bone     Past Medical History:   Diagnosis Date    Arrhythmia     ATRIAL FIBRILLATION - when thyroid \"went out\" - no problem since thyroid is under control    Arthritis     Bilateral groin pain 2016    GERD (gastroesophageal reflux disease)     Hiatal hernia     Hypertension     Ill-defined condition     KIDNEY STONE    Joint pain     Multiple stiff joints     Muscle ache     Sinus problem     Thyroid disease     radiation tablet     Social History     Tobacco Use    Smoking status: Never Smoker    Smokeless tobacco: Never Used   Substance Use Topics    Alcohol use: No     Alcohol/week: 0.0 standard drinks    Drug use: No     No current facility-administered medications on file prior to encounter. Current Outpatient Medications on File Prior to Encounter   Medication Sig Dispense Refill    nebivoloL (Bystolic) 10 mg tablet Take 10 mg by mouth daily.  pseudoephedrine/acetaminophen (TYLENOL SINUS PO) Take  by mouth as needed.  amLODIPine (NORVASC) 10 mg tablet Take 10 mg by mouth nightly.       ibuprofen (MOTRIN) 800 mg tablet Take 800 mg by mouth two (2) times daily as needed. 3    SYNTHROID 175 mcg tablet Take 175 mcg by mouth daily. Takes at 2am.      pantoprazole (PROTONIX) 40 mg tablet Take 40 mg by mouth daily as needed.  CETIRIZINE HCL (ZYRTEC PO) Take 10 mg by mouth daily as needed.  ASPIRIN (ASPIR-81 PO) Take 81 mg by mouth daily. Allergies   Allergen Reactions    Codeine Other (comments)     Family History   Problem Relation Age of Onset    Cancer Mother         breast    Heart Disease Mother     Heart Failure Mother     Pacemaker Mother     Cancer Father         prostate x 4    Diabetes Brother     Cancer Brother         prostate    Diabetes Brother     Cancer Daughter         cervical cancer     No current facility-administered medications for this encounter. PHYSICAL EXAM:    BP (!) 147/81   Pulse 60   Temp 98.4 °F (36.9 °C)   Resp 12   Ht 5' 10\" (1.778 m)   SpO2 95%   BMI 36.30 kg/m²      General: WD, WN. Alert, cooperative, no acute distress    HEENT: NC, Atraumatic. PERRLA, EOMI. Anicteric sclerae. Lungs:  CTA Bilaterally. No Wheezing/Rhonchi/Rales. Heart:  Regular  rhythm,  No murmur, No Rubs, No Gallops  Abdomen: Soft, Non distended, Non tender. +Bowel sounds, no HSM  Extremities: No c/c/e  Neurologic:  CN 2-12 gi, Alert and oriented X 3. No acute neurological distress   Psych:   Good insight. Not anxious nor agitated. Assessment:   · Screening colonoscopy, last colonoscopy 2016.  Repeat recommended in 5 yrs    Plan:   · Endoscopic procedure: Colonsoscopy  · Anesthesia plan: Monitored Anesthesia Care

## 2021-10-06 NOTE — PERIOP NOTES

## 2021-12-20 ENCOUNTER — TRANSCRIBE ORDER (OUTPATIENT)
Dept: SCHEDULING | Age: 71
End: 2021-12-20

## 2021-12-20 DIAGNOSIS — K21.00 HIATAL HERNIA WITH GERD AND ESOPHAGITIS: ICD-10-CM

## 2021-12-20 DIAGNOSIS — K22.2 STRICTURE AND STENOSIS OF ESOPHAGUS: ICD-10-CM

## 2021-12-20 DIAGNOSIS — R13.19 ESOPHAGEAL DYSPHAGIA: ICD-10-CM

## 2021-12-20 DIAGNOSIS — K44.9 HIATAL HERNIA WITH GERD AND ESOPHAGITIS: ICD-10-CM

## 2021-12-20 DIAGNOSIS — K58.2 IRRITABLE BOWEL SYNDROME WITH CONSTIPATION AND DIARRHEA: ICD-10-CM

## 2021-12-20 DIAGNOSIS — R10.13 EPIGASTRIC PAIN: Primary | ICD-10-CM

## 2021-12-27 ENCOUNTER — HOSPITAL ENCOUNTER (OUTPATIENT)
Dept: GENERAL RADIOLOGY | Age: 71
Discharge: HOME | End: 2021-12-27
Attending: INTERNAL MEDICINE
Payer: MEDICARE

## 2021-12-27 DIAGNOSIS — K58.2 IRRITABLE BOWEL SYNDROME WITH CONSTIPATION AND DIARRHEA: ICD-10-CM

## 2021-12-27 DIAGNOSIS — K44.9 HIATAL HERNIA WITH GERD AND ESOPHAGITIS: ICD-10-CM

## 2021-12-27 DIAGNOSIS — R13.19 ESOPHAGEAL DYSPHAGIA: ICD-10-CM

## 2021-12-27 DIAGNOSIS — K21.00 HIATAL HERNIA WITH GERD AND ESOPHAGITIS: ICD-10-CM

## 2021-12-27 DIAGNOSIS — K22.2 STRICTURE AND STENOSIS OF ESOPHAGUS: ICD-10-CM

## 2021-12-27 DIAGNOSIS — R10.13 EPIGASTRIC PAIN: ICD-10-CM

## 2021-12-27 PROCEDURE — 74220 X-RAY XM ESOPHAGUS 1CNTRST: CPT

## 2022-02-11 ENCOUNTER — TRANSCRIBE ORDER (OUTPATIENT)
Dept: REGISTRATION | Age: 72
End: 2022-02-11

## 2022-02-11 ENCOUNTER — HOSPITAL ENCOUNTER (OUTPATIENT)
Dept: PREADMISSION TESTING | Age: 72
Discharge: HOME | End: 2022-02-11
Attending: INTERNAL MEDICINE
Payer: MEDICARE

## 2022-02-11 DIAGNOSIS — U07.1 COVID-19: ICD-10-CM

## 2022-02-11 DIAGNOSIS — U07.1 COVID-19: Primary | ICD-10-CM

## 2022-02-11 LAB
SARS-COV-2, XPLCVT: NOT DETECTED
SOURCE, COVRS: NORMAL

## 2022-02-11 PROCEDURE — U0005 INFEC AGEN DETEC AMPLI PROBE: HCPCS

## 2022-02-16 ENCOUNTER — ANESTHESIA (OUTPATIENT)
Dept: ENDOSCOPY | Age: 72
End: 2022-02-16
Payer: MEDICARE

## 2022-02-16 ENCOUNTER — ANESTHESIA EVENT (OUTPATIENT)
Dept: ENDOSCOPY | Age: 72
End: 2022-02-16
Payer: MEDICARE

## 2022-02-16 ENCOUNTER — HOSPITAL ENCOUNTER (OUTPATIENT)
Age: 72
Setting detail: OUTPATIENT SURGERY
Discharge: HOME | End: 2022-02-16
Attending: INTERNAL MEDICINE | Admitting: INTERNAL MEDICINE
Payer: MEDICARE

## 2022-02-16 VITALS
WEIGHT: 222 LBS | HEART RATE: 59 BPM | DIASTOLIC BLOOD PRESSURE: 73 MMHG | TEMPERATURE: 99.1 F | OXYGEN SATURATION: 96 % | BODY MASS INDEX: 31.78 KG/M2 | RESPIRATION RATE: 19 BRPM | SYSTOLIC BLOOD PRESSURE: 137 MMHG | HEIGHT: 70 IN

## 2022-02-16 PROCEDURE — 88305 TISSUE EXAM BY PATHOLOGIST: CPT

## 2022-02-16 PROCEDURE — 74011250636 HC RX REV CODE- 250/636: Performed by: NURSE ANESTHETIST, CERTIFIED REGISTERED

## 2022-02-16 PROCEDURE — 77030021593 HC FCPS BIOP ENDOSC BSC -A: Performed by: INTERNAL MEDICINE

## 2022-02-16 PROCEDURE — 77030020268 HC MISC GENERAL SUPPLY: Performed by: INTERNAL MEDICINE

## 2022-02-16 PROCEDURE — 77030039825 HC MSK NSL PAP SUPERNO2VA VYRM -B: Performed by: NURSE ANESTHETIST, CERTIFIED REGISTERED

## 2022-02-16 PROCEDURE — 74011000250 HC RX REV CODE- 250: Performed by: NURSE ANESTHETIST, CERTIFIED REGISTERED

## 2022-02-16 PROCEDURE — 2709999900 HC NON-CHARGEABLE SUPPLY: Performed by: INTERNAL MEDICINE

## 2022-02-16 PROCEDURE — 76060000031 HC ANESTHESIA FIRST 0.5 HR: Performed by: INTERNAL MEDICINE

## 2022-02-16 PROCEDURE — 76040000019: Performed by: INTERNAL MEDICINE

## 2022-02-16 RX ORDER — SODIUM CHLORIDE 9 MG/ML
INJECTION, SOLUTION INTRAVENOUS
Status: DISCONTINUED | OUTPATIENT
Start: 2022-02-16 | End: 2022-02-16 | Stop reason: HOSPADM

## 2022-02-16 RX ORDER — SODIUM CHLORIDE 0.9 % (FLUSH) 0.9 %
5-40 SYRINGE (ML) INJECTION EVERY 8 HOURS
Status: DISCONTINUED | OUTPATIENT
Start: 2022-02-16 | End: 2022-02-16 | Stop reason: HOSPADM

## 2022-02-16 RX ORDER — SODIUM CHLORIDE 0.9 % (FLUSH) 0.9 %
5-40 SYRINGE (ML) INJECTION AS NEEDED
Status: DISCONTINUED | OUTPATIENT
Start: 2022-02-16 | End: 2022-02-16 | Stop reason: HOSPADM

## 2022-02-16 RX ORDER — FENTANYL CITRATE 50 UG/ML
12.5-2 INJECTION, SOLUTION INTRAMUSCULAR; INTRAVENOUS
Status: DISCONTINUED | OUTPATIENT
Start: 2022-02-16 | End: 2022-02-16 | Stop reason: HOSPADM

## 2022-02-16 RX ORDER — FLUMAZENIL 0.1 MG/ML
0.2 INJECTION INTRAVENOUS
Status: DISCONTINUED | OUTPATIENT
Start: 2022-02-16 | End: 2022-02-16 | Stop reason: HOSPADM

## 2022-02-16 RX ORDER — MIDAZOLAM HYDROCHLORIDE 1 MG/ML
.25-5 INJECTION, SOLUTION INTRAMUSCULAR; INTRAVENOUS
Status: DISCONTINUED | OUTPATIENT
Start: 2022-02-16 | End: 2022-02-16 | Stop reason: HOSPADM

## 2022-02-16 RX ORDER — LIDOCAINE HYDROCHLORIDE 20 MG/ML
INJECTION, SOLUTION EPIDURAL; INFILTRATION; INTRACAUDAL; PERINEURAL AS NEEDED
Status: DISCONTINUED | OUTPATIENT
Start: 2022-02-16 | End: 2022-02-16 | Stop reason: HOSPADM

## 2022-02-16 RX ORDER — EPINEPHRINE 0.1 MG/ML
1 INJECTION INTRACARDIAC; INTRAVENOUS
Status: DISCONTINUED | OUTPATIENT
Start: 2022-02-16 | End: 2022-02-16 | Stop reason: HOSPADM

## 2022-02-16 RX ORDER — ATROPINE SULFATE 0.1 MG/ML
0.5 INJECTION INTRAVENOUS
Status: DISCONTINUED | OUTPATIENT
Start: 2022-02-16 | End: 2022-02-16 | Stop reason: HOSPADM

## 2022-02-16 RX ORDER — DEXTROMETHORPHAN/PSEUDOEPHED 2.5-7.5/.8
1.2 DROPS ORAL
Status: DISCONTINUED | OUTPATIENT
Start: 2022-02-16 | End: 2022-02-16 | Stop reason: HOSPADM

## 2022-02-16 RX ORDER — SODIUM CHLORIDE 9 MG/ML
75 INJECTION, SOLUTION INTRAVENOUS CONTINUOUS
Status: DISCONTINUED | OUTPATIENT
Start: 2022-02-16 | End: 2022-02-16 | Stop reason: HOSPADM

## 2022-02-16 RX ORDER — NALOXONE HYDROCHLORIDE 0.4 MG/ML
0.4 INJECTION, SOLUTION INTRAMUSCULAR; INTRAVENOUS; SUBCUTANEOUS
Status: DISCONTINUED | OUTPATIENT
Start: 2022-02-16 | End: 2022-02-16 | Stop reason: HOSPADM

## 2022-02-16 RX ORDER — PROPOFOL 10 MG/ML
INJECTION, EMULSION INTRAVENOUS AS NEEDED
Status: DISCONTINUED | OUTPATIENT
Start: 2022-02-16 | End: 2022-02-16 | Stop reason: HOSPADM

## 2022-02-16 RX ADMIN — PROPOFOL 50 MG: 10 INJECTION, EMULSION INTRAVENOUS at 10:56

## 2022-02-16 RX ADMIN — PROPOFOL 50 MG: 10 INJECTION, EMULSION INTRAVENOUS at 11:03

## 2022-02-16 RX ADMIN — PROPOFOL 50 MG: 10 INJECTION, EMULSION INTRAVENOUS at 11:09

## 2022-02-16 RX ADMIN — PROPOFOL 50 MG: 10 INJECTION, EMULSION INTRAVENOUS at 10:59

## 2022-02-16 RX ADMIN — PROPOFOL 100 MG: 10 INJECTION, EMULSION INTRAVENOUS at 10:54

## 2022-02-16 RX ADMIN — LIDOCAINE HYDROCHLORIDE 100 MG: 20 INJECTION, SOLUTION EPIDURAL; INFILTRATION; INTRACAUDAL; PERINEURAL at 10:54

## 2022-02-16 RX ADMIN — SODIUM CHLORIDE: 900 INJECTION, SOLUTION INTRAVENOUS at 10:52

## 2022-02-16 RX ADMIN — PROPOFOL 50 MG: 10 INJECTION, EMULSION INTRAVENOUS at 11:05

## 2022-02-16 RX ADMIN — PROPOFOL 50 MG: 10 INJECTION, EMULSION INTRAVENOUS at 11:01

## 2022-02-16 RX ADMIN — PROPOFOL 50 MG: 10 INJECTION, EMULSION INTRAVENOUS at 10:55

## 2022-02-16 RX ADMIN — PROPOFOL 50 MG: 10 INJECTION, EMULSION INTRAVENOUS at 11:07

## 2022-02-16 NOTE — PROCEDURES
[address] [place] [address]   [phone]           Endoscopic Gastroduodenoscopy Procedure Note    Shreya Lane  1950  [id number]    Indication: Dysphagia, esophagram with schatzki ring and epigsatric pain     : Susanna Alonso MD    Staff: Endoscopy Technician-1: Charlie Martini  Endoscopy RN-1: Kaylynn Estrada RN     Referring Provider:  Ainsley Chin MD      Anesthesia/Sedation:  MAC anesthesia      Procedure Details     After infomed consent was obtained for the procedure, with all risks and benefits of procedure explained the patient was taken to the endoscopy suite and placed in the left lateral decubitus position. Following sequential administration of sedation as per above, the endoscope was inserted into the mouth and advanced under direct vision to second portion of the duodenum. A careful inspection was made as the gastroscope was withdrawn, including a retroflexed view of the proximal stomach; findings and interventions are described below. Findings:   Esophagus: Mild non-obstructing schatzki ring noted at GE junction without resistance passing adult upper endoscope. Noted to have mild distal esophageal tortuosity. Dilation performed with savary dilator starting at 16 mm up to 18 mm. This did not result in any change in schatzki ring, however resulted in linear tear at 15 mm from incisors with self limited oozing. Stomach: 5 cm sliding hiatal hernia noted with Hill grade 3 flap valve. GE junction and z-line at 35 cm and diaphragmatic hiatus at 40 cm from incisors. Mild gastric erosions in the antrum. Duodenum/jejunum: normal    Therapies:  esophageal dilation with savary sizes 16 mm to 18 mm  biopsy of stomach fundus, body, antrum    Specimens:   ID Type Source Tests Collected by Time Destination   1 : gastric bx Preservative Gastric  Chinedu Gusman MD 2/16/2022 1108 Pathology               EBL:   Minimal    Complications:  None; patient tolerated the procedure well. Impression:      -5 cm sliding hiatal hernia with mildly tortuous distal esophagus  -Mild non-obstructing schatki ring without resistance passing scope. Dilated to 18 mm without change - resulting in proximal tear. -Mild gastric inflammation. Biopsies obtained and sent for pathology. Recommendations:  -Resume normal medications  -Continue acid suppression.  -No Non-Steroidal Anti Inflammatorys (NSAIDS)   -Await pathology. -GERD diet: avoid fried and fatty foods.  peppermint, chocolate, alcohol, coffee, citrus fruits and juices, tomoato products; avoid lying down for 2 to 3 hours after eating.  -Follow up in office as previously planned    Steven Hagan MD  2/16/2022  11:14 AM

## 2022-02-16 NOTE — H&P
[address]  [phone]        History and Physical     NAME: Sarai Cho   :  1950   MRN:  [id number]         HPI:  Sarai Cho is a 80 y.o. male here for EGD for epigastric pain and dysphagia with abnormal esophagram showing esophageal stricture. Has h/o schatzki ring, s/p dilation. Reports stopping all NSAIDs for the last month. Past Surgical History:   Procedure Laterality Date    COLONOSCOPY N/A 10/4/2016    COLONOSCOPY performed by aSnchez Bustamante MD at [address] COLONOSCOPY N/A 10/6/2021    COLONOSCOPY   :- performed by Pearl Osman MD at [facility] ENDOSCOPY    HX APPENDECTOMY      HX CATARACT REMOVAL      BILATERAL CATARACT REMOVAL    HX GI      ESOPHAGEAL DILITATION (SEVERAL)    HX HERNIA REPAIR Left     HX ORTHOPAEDIC      left wrist (bone taken out of thumb and a tendon transplanted) and left shoulder - bone spur removed, right knee - meniscus removed and right shoulder x 2- bone spurs x2 and removed part of collar bone     Past Medical History:   Diagnosis Date    Arrhythmia     ATRIAL FIBRILLATION - when thyroid \"went out\" - no problem since thyroid is under control    Arthritis     Bilateral groin pain 2016    GERD (gastroesophageal reflux disease)     Hiatal hernia     Hypertension     Ill-defined condition     KIDNEY STONE    Joint pain     Multiple stiff joints     Muscle ache     Sinus problem     Thyroid disease     radiation tablet     Social History     Tobacco Use    Smoking status: Never Smoker    Smokeless tobacco: Never Used   Substance Use Topics    Alcohol use: No     Alcohol/week: 0.0 standard drinks    Drug use: No     No current facility-administered medications on file prior to encounter. Current Outpatient Medications on File Prior to Encounter   Medication Sig Dispense Refill    GABAPENTIN PO Take  by mouth.       nebivoloL (Bystolic) 10 mg tablet Take 10 mg by mouth daily.  pseudoephedrine/acetaminophen (TYLENOL SINUS PO) Take  by mouth as needed.  amLODIPine (NORVASC) 10 mg tablet Take 10 mg by mouth nightly.  SYNTHROID 175 mcg tablet Take 175 mcg by mouth daily. Takes at 2am.      pantoprazole (PROTONIX) 40 mg tablet Take 40 mg by mouth daily as needed.  CETIRIZINE HCL (ZYRTEC PO) Take 10 mg by mouth daily as needed.  ASPIRIN (ASPIR-81 PO) Take 81 mg by mouth daily. Allergies   Allergen Reactions    Codeine Other (comments)     Family History   Problem Relation Age of Onset    Cancer Mother         breast    Heart Disease Mother     Heart Failure Mother     Pacemaker Mother     Cancer Father         prostate x 4    Diabetes Brother     Cancer Brother         prostate    Diabetes Brother     Cancer Daughter         cervical cancer     Current Facility-Administered Medications   Medication Dose Route Frequency    0.9% sodium chloride infusion  75 mL/hr IntraVENous CONTINUOUS    sodium chloride (NS) flush 5-40 mL  5-40 mL IntraVENous Q8H    sodium chloride (NS) flush 5-40 mL  5-40 mL IntraVENous PRN    midazolam (VERSED) injection 0.25-5 mg  0.25-5 mg IntraVENous Multiple    fentaNYL citrate (PF) injection 12.5-200 mcg  12.5-200 mcg IntraVENous Multiple    naloxone (NARCAN) injection 0.4 mg  0.4 mg IntraVENous Multiple    flumazeniL (ROMAZICON) 0.1 mg/mL injection 0.2 mg  0.2 mg IntraVENous Multiple    simethicone (MYLICON) 14VS/2.9LJ oral drops 80 mg  1.2 mL Oral Multiple    atropine injection 0.5 mg  0.5 mg IntraVENous ONCE PRN    EPINEPHrine (ADRENALIN) 0.1 mg/mL syringe 1 mg  1 mg Endoscopically ONCE PRN       PHYSICAL EXAM:    BP (!) 147/74   Pulse 64   Temp 98.2 °F (36.8 °C)   Resp 17   Ht 5' 10\" (1.778 m)   Wt 100.7 kg (222 lb)   SpO2 95%   BMI 31.85 kg/m²      General: WD, WN. Alert, cooperative, no acute distress    HEENT: NC, Atraumatic. PERRLA, EOMI. Anicteric sclerae. Lungs:  CTA Bilaterally.  No Wheezing/Rhonchi/Rales. Heart:  Regular  rhythm,  No murmur, No Rubs, No Gallops  Abdomen: Soft, Non distended, Non tender. +Bowel sounds, no HSM  Extremities: No c/c/e  Neurologic:  CN 2-12 gi, Alert and oriented X 3. No acute neurological distress   Psych:   Good insight. Not anxious nor agitated.        Assessment:   · Epigastric pain  · Dysphagia with evidence of esophageal stricture on esophagram and h/o schatzki ring, s/p dilation in the past    Plan:   · Endoscopic procedure: EGD  · Anesthesia plan: Monitored Anesthesia Care

## 2022-02-16 NOTE — ROUTINE PROCESS
Discharge instructions reviewed with patient, hard copy given to patient. All concerns addressed. D'c'd iv without any difficulty. Pt to be taken via wheelchair to discharge area . Wife driving pt home.

## 2022-02-16 NOTE — PERIOP NOTES
Initial RN admission and assessment performed and documented in Endoscopy navigator. Patient evaluated by anesthesia in pre-procedure holding. All procedural vital signs, airway assessment, and level of consciousness information monitored and recorded by anesthesia staff on the anesthesia record. Esophageal dilation with 48f,  51f, and 54f savory. No crepitus noted to chest at this time. Pt remains sedated. Report received from CRNA post procedure. Patient transported to recovery area by RN. Endoscope was pre-cleaned at bedside immediately following procedure by A. Leopold Searle.

## 2022-02-16 NOTE — ANESTHESIA PREPROCEDURE EVALUATION
Relevant Problems   No relevant active problems       Anesthetic History   No history of anesthetic complications            Review of Systems / Medical History  Patient summary reviewed, nursing notes reviewed and pertinent labs reviewed    Pulmonary  Within defined limits                 Neuro/Psych   Within defined limits           Cardiovascular    Hypertension        Dysrhythmias : atrial fibrillation           GI/Hepatic/Renal     GERD           Endo/Other        Arthritis     Other Findings              Physical Exam    Airway  Mallampati: II  TM Distance: > 6 cm  Neck ROM: normal range of motion   Mouth opening: Normal     Cardiovascular  Regular rate and rhythm,  S1 and S2 normal,  no murmur, click, rub, or gallop             Dental  No notable dental hx       Pulmonary  Breath sounds clear to auscultation               Abdominal  GI exam deferred       Other Findings            Anesthetic Plan    ASA: 3  Anesthesia type: MAC          Induction: Intravenous  Anesthetic plan and risks discussed with: Patient

## 2022-02-16 NOTE — DISCHARGE INSTRUCTIONS
[address], [address] [place] [address]          Rosalind Rivera  [id number]  1950    EGD DISCHARGE INSTRUCTIONS    DISCOMFORT:  Redness at IV site- apply warm compress to area; if redness or soreness persist- contact your physician    Gaseous discomfort- walking, belching will help relieve any discomfort    DIET:   GERD diet (see below)        ACTIVITY:  You may resume your normal daily activities it is recommended that you spend the remainder of the day resting -  avoid any strenuous activity. You may not operate a vehicle for 12 hours  You may not engage in an occupation involving machinery or appliances for rest of today  You may not drink alcoholic beverages for at least 12 hours  Avoid making any critical decisions for at least 24 hour    CALL M.D. ANY SIGN OF:   Increasing pain, nausea, vomiting  Abdominal distension (swelling)  New increased bleeding (oral or rectal)  Fever (chills)  Pain in chest area  Bloody discharge from nose or mouth  Shortness of breath     Follow-up Instructions:   Call Dr. Rosmery Ross for any questions or problems. Telephone # [phone]  If a biopsy was taken, your results will be available in  5 to 7 days      Impression:      Impression:      -5 cm sliding hiatal hernia with mildly tortuous distal esophagus  -Mild non-obstructing schatki ring without resistance passing scope. Dilated to 18 mm without change - resulting in proximal tear. -Mild gastric inflammation. Biopsies obtained and sent for pathology. Recommendations:  -Resume normal medications  -Continue acid suppression.  -No Non-Steroidal Anti Inflammatorys (NSAIDS)   -Await pathology. -GERD diet: avoid fried and fatty foods.  peppermint, chocolate, alcohol, coffee, citrus fruits and juices, tomoato products; avoid lying down for 2 to 3 hours after eating.  -Follow up in office as previously planned    Rosmery Ross MD    Patient Education        Gastritis: Care Instructions  Your Care Instructions     Gastritis is a sore and upset stomach. It happens when something irritates the stomach lining. Many things can cause it. These include an infection such as the flu or something you ate or drank. Medicines or a sore on the lining of the stomach (ulcer) also can cause it. Your belly may bloat and ache. You may belch, vomit, and feel sick to your stomach. You should be able to relieve the problem by taking medicine. And it may help to change your diet. If gastritis lasts, your doctor may prescribe medicine. Follow-up care is a key part of your treatment and safety. Be sure to make and go to all appointments, and call your doctor if you are having problems. It's also a good idea to know your test results and keep a list of the medicines you take. How can you care for yourself at home? · If your doctor prescribed antibiotics, take them as directed. Do not stop taking them just because you feel better. You need to take the full course of antibiotics. · Be safe with medicines. If your doctor prescribed medicine to decrease stomach acid, take it as directed. Call your doctor if you think you are having a problem with your medicine. · Do not take any other medicine, including over-the-counter pain relievers, without talking to your doctor first.  · If your doctor recommends over-the-counter medicine to reduce stomach acid, such as Pepcid AC (famotidine), Prilosec (omeprazole), or Tagamet HB (cimetidine) follow the directions on the label. · Drink plenty of fluids to prevent dehydration. Choose water and other clear liquids. If you have kidney, heart, or liver disease and have to limit fluids, talk with your doctor before you increase the amount of fluids you drink. · Limit how much alcohol you drink. · Limit or avoid caffeine, which is found in coffee, tea, cola drinks, and chocolate. When should you call for help? Call 911 anytime you think you may need emergency care. For example, call if:    · You vomit blood or what looks like coffee grounds.     · You pass maroon or very bloody stools. Call your doctor now or seek immediate medical care if:    · You start breathing fast and have not produced urine in the last 8 hours.     · You cannot keep fluids down. Watch closely for changes in your health, and be sure to contact your doctor if:    · You do not get better as expected. Where can you learn more? Go to http://www.herndon.com/  Enter Z536 in the search box to learn more about \"Gastritis: Care Instructions. \"  Current as of: February 10, 2021               Content Version: 13.0  © 3134-4870 [employer]. Care instructions adapted under license by [employer] (which disclaims liability or warranty for this information). If you have questions about a medical condition or this instruction, always ask your healthcare professional. [place] [address] any warranty or liability for your use of this information. Learning About Schatzki's Ring  What is Schatzki's ring? A Schatzki's ring is a ring of tissue that forms inside the esophagus, the tube that carries food and liquid to your stomach. This ring makes the esophagus narrow in one area, close to where it meets the stomach. It can make it hard to swallow. You may feel like food gets stuck in your esophagus. Doctors aren't sure exactly what causes these rings. How is Schatzki's ring diagnosed? Your doctor may check your esophagus if you are having trouble swallowing or if you feel like food is getting stuck. The doctor will use a tool called an endoscope, or scope. It's a thin, flexible, lighted viewing tool. It goes into the mouth and down the throat. Your doctor can use it to check for any problems. The scope can also be used to take a sample of tissue to test (biopsy). You might need an X-ray.  For the X-ray, you may need to swallow a substance, such as barium, that makes it easier to see what happens in your esophagus. How is Schatzki's ring treated? A Schatzki's ring is usually treated with a procedure called esophageal dilation. Dilation can open up narrow areas of the esophagus. Before the procedure, you will get medicines through a needle in your vein (IV) in your arm or hand. These medicines reduce pain and will make you feel relaxed and drowsy. Your throat will also be numbed. You may not remember much about the treatment. The doctor will guide a balloon or a plastic tool for widening (dilator) down your throat and into your esophagus. The dilator is used to widen any narrow area. To guide the dilator, the doctor may use a scope. Or he or she may use a thin wire as a guide. After the procedure, you will be observed for 1 to 2 hours until the medicines wear off. If your throat was numbed before the test, you should not eat or drink until your throat is no longer numb. When you are fully recovered, you can go home. You will not be able to drive or operate machinery for 12 hours after the test. Your doctor will tell you when you can go back to your usual diet and activities. Do not drink alcohol for 12 to 24 hours after the test.  You may still need to treat some symptoms of GERD. Your doctor may give you information about that. Follow-up care is a key part of your treatment and safety. Be sure to make and go to all appointments, and call your doctor if you are having problems. It's also a good idea to know your test results and keep a list of the medicines you take. Where can you learn more? Go to http://www.gray.com/  Enter K390 in the search box to learn more about \"Learning About Schatzki's Ring. \"  Current as of: February 10, 2021               Content Version: 13.0  © 5405-4413 [employer].    Care instructions adapted under license by [employer] (which disclaims liability or warranty for this information). If you have questions about a medical condition or this instruction, always ask your healthcare professional. [address] any warranty or liability for your use of this information. Hiatal Hernia: Care Instructions  Your Care Instructions  A hiatal hernia occurs when part of the stomach bulges into the chest cavity. A hiatal hernia may allow stomach acid and juices to back up into the esophagus (acid reflux). This can cause a feeling of burning, warmth, heat, or pain behind the breastbone. This feeling may often occur after you eat, soon after you lie down, or when you bend forward, and it may come and go. You also may have a sour taste in your mouth. These symptoms are commonly known as heartburn or reflux. But not all hiatal hernias cause symptoms. Follow-up care is a key part of your treatment and safety. Be sure to make and go to all appointments, and call your doctor if you are having problems. It's also a good idea to know your test results and keep a list of the medicines you take. How can you care for yourself at home? · Take your medicines exactly as prescribed. Call your doctor if you think you are having a problem with your medicine. · Do not take aspirin or other nonsteroidal anti-inflammatory drugs (NSAIDs), such as ibuprofen (Advil, Motrin) or naproxen (Aleve), unless your doctor says it is okay. Ask your doctor what you can take for pain. · Your doctor may recommend over-the-counter medicine. For mild or occasional indigestion, antacids such as Tums, Gaviscon, Maalox, or Mylanta may help. Your doctor also may recommend over-the-counter acid reducers, such as famotidine (Pepcid AC), cimetidine (Tagamet HB), or omeprazole (Prilosec). Read and follow all instructions on the label. If you use these medicines often, talk with your doctor. · Change your eating habits.   ? It's best to eat several small meals instead of two or three large meals.  ? After you eat, wait 2 to 3 hours before you lie down. Late-night snacks aren't a good idea. ? Chocolate, mint, and alcohol can make heartburn worse. They relax the valve between the esophagus and the stomach. ? Spicy foods, foods that have a lot of acid (like tomatoes and oranges), and coffee can make heartburn symptoms worse in some people. If your symptoms are worse after you eat a certain food, you may want to stop eating that food to see if your symptoms get better. · Do not smoke or chew tobacco.  · If you get heartburn at night, raise the head of your bed 6 to 8 inches by putting the frame on blocks or placing a foam wedge under the head of your mattress. (Adding extra pillows does not work.)  · Do not wear tight clothing around your middle. · Lose weight if you need to. Losing just 5 to 10 pounds can help. When should you call for help? Call your doctor now or seek immediate medical care if:    · You have new or worse belly pain.     · You are vomiting. Watch closely for changes in your health, and be sure to contact your doctor if:    · You have new or worse symptoms of indigestion.     · You have trouble or pain swallowing.     · You are losing weight.     · You do not get better as expected. Where can you learn more? Go to http://www.herndon.com/  Enter T074 in the search box to learn more about \"Hiatal Hernia: Care Instructions. \"  Current as of: February 10, 2021               Content Version: 13.0  © 2006-2021 [employer]. Care instructions adapted under license by [employer] (which disclaims liability or warranty for this information). If you have questions about a medical condition or this instruction, always ask your healthcare professional. [place] [address] any warranty or liability for your use of this information.            Learning About Coronavirus [phone])  Coronavirus [phone]): Overview  What is coronavirus (IMERY-37)? The coronavirus disease (COVID-19) is caused by a virus. It is an illness that was first found in [place], [place], in December 2019. It has since spread worldwide. The virus can cause fever, cough, and trouble breathing. In severe cases, it can cause pneumonia and make it hard to breathe without help. It can cause death. Coronaviruses are a large group of viruses. They cause the common cold. They also cause more serious illnesses like Middle East respiratory syndrome (MERS) and severe acute respiratory syndrome (SARS). COVID-19 is caused by a novel coronavirus. That means it's a new type that has not been seen in people before. This virus spreads person-to-person through droplets from coughing and sneezing. It can also spread when you are close to someone who is infected. And it can spread when you touch something that has the virus on it, such as a doorknob or a tabletop. What can you do to protect yourself from coronavirus (COVID-19)? The best way to protect yourself from getting sick is to:  · Avoid areas where there is an outbreak. · Avoid contact with people who may be infected. · Wash your hands often with soap or alcohol-based hand sanitizers. · Avoid crowds and try to stay at least 6 feet away from other people. · Wash your hands often, especially after you cough or sneeze. Use soap and water, and scrub for at least 20 seconds. If soap and water aren't available, use an alcohol-based hand . · Avoid touching your mouth, nose, and eyes. What can you do to avoid spreading the virus to others? To help avoid spreading the virus to others:  · Cover your mouth with a tissue when you cough or sneeze. Then throw the tissue in the trash. · Use a disinfectant to clean things that you touch often. · Stay home if you are sick or have been exposed to the virus. Don't go to school, work, or public areas. And don't use public transportation.   · If you are sick:  ? Leave your home only if you need to get medical care. But call the doctor's office first so they know you're coming. And wear a face mask, if you have one.  ? If you have a face mask, wear it whenever you're around other people. It can help stop the spread of the virus when you cough or sneeze. ? Clean and disinfect your home every day. Use household  and disinfectant wipes or sprays. Take special care to clean things that you grab with your hands. These include doorknobs, remote controls, phones, and handles on your refrigerator and microwave. And don't forget countertops, tabletops, bathrooms, and computer keyboards. When to call for help  Call 911 anytime you think you may need emergency care. For example, call if:  · You have severe trouble breathing. (You can't talk at all.)  · You have constant chest pain or pressure. · You are severely dizzy or lightheaded. · You are confused or can't think clearly. · Your face and lips have a blue color. · You pass out (lose consciousness) or are very hard to wake up. Call your doctor now if you develop symptoms such as:  · Shortness of breath. · Fever. · Cough. If you need to get care, call ahead to the doctor's office for instructions before you go. Make sure you wear a face mask, if you have one, to prevent exposing other people to the virus. Where can you get the latest information? The following health organizations are tracking and studying this virus. Their websites contain the most up-to-date information. Caridad Cronin also learn what to do if you think you may have been exposed to the virus. · U.S. Centers for Disease Control and Prevention (CDC): The CDC provides updated news about the disease and travel advice. The website also tells you how to prevent the spread of infection. www.cdc.gov  · World Health Organization [facility]): WHO offers information about the virus outbreaks.  WHO also has travel advice. www.who.int  Current as of: April 1, 2020               Content Version: 12.4  © 1593-5119 Healthwise, Incorporated. Care instructions adapted under license by your healthcare professional. If you have questions about a medical condition or this instruction, always ask your healthcare professional. [address] any warranty or liability for your use of this information.

## 2022-03-07 NOTE — PROGRESS NOTES
HPI: Jamey Ricketts (: 1950) is a 80 y.o. male, patient, here for evaluation of the following chief complaint(s):    Hand Pain (Right hand Swelling and weakness )  Patient is seen today to evaluate his hands. He has had prior bilateral thumb CMC arthroplasties. He initially had undergone a left thumb CMC arthroplasty then later had a right thumb CMC arthroplasty including Arthrex mini tight rope suspension plasty approximately 2 to 3 years ago. He occasionally has some discomfort in the right side. He denies any fall or other injury. He remains quite active with his hands and is seen for further treatment. Vitals:  Ht 5' 10.75\" (1.797 m)   Wt 218 lb (98.9 kg)   BMI 30.62 kg/m²    Body mass index is 30.62 kg/m². Allergies   Allergen Reactions    Codeine Other (comments)       Current Outpatient Medications   Medication Sig    diphenoxylate-atropine (LOMOTIL) 2.5-0.025 mg per tablet diphenoxylate-atropine 2.5 mg-0.025 mg tablet    GABAPENTIN PO Take  by mouth.  nebivoloL (Bystolic) 10 mg tablet Take 10 mg by mouth daily.  pseudoephedrine/acetaminophen (TYLENOL SINUS PO) Take  by mouth as needed.  amLODIPine (NORVASC) 10 mg tablet Take 10 mg by mouth nightly.  SYNTHROID 175 mcg tablet Take 175 mcg by mouth daily. Takes at 2am.    pantoprazole (PROTONIX) 40 mg tablet Take 40 mg by mouth daily as needed.  CETIRIZINE HCL (ZYRTEC PO) Take 10 mg by mouth daily as needed.  ASPIRIN (ASPIR-81 PO) Take 81 mg by mouth daily. No current facility-administered medications for this visit.        Past Medical History:   Diagnosis Date    Arrhythmia     ATRIAL FIBRILLATION - when thyroid \"went out\" - no problem since thyroid is under control    Arthritis     Bilateral groin pain 2016    GERD (gastroesophageal reflux disease)     Hiatal hernia     Hypertension     Ill-defined condition     KIDNEY STONE    Joint pain     Multiple stiff joints     Muscle ache     Sinus problem     Thyroid disease     radiation tablet        Past Surgical History:   Procedure Laterality Date    COLONOSCOPY N/A 10/4/2016    COLONOSCOPY performed by Mraika G[facility]mez MD at [facility] ENDOSCOPY    COLONOSCOPY N/A 10/6/2021    COLONOSCOPY   :- performed by Antonette Acosta MD at [facility] ENDOSCOPY    HX APPENDECTOMY      HX CATARACT REMOVAL      BILATERAL CATARACT REMOVAL    HX GI      ESOPHAGEAL DILITATION (SEVERAL)    HX HERNIA REPAIR Left     HX ORTHOPAEDIC      left wrist (bone taken out of thumb and a tendon transplanted) and left shoulder - bone spur removed, right knee - meniscus removed and right shoulder x 2- bone spurs x2 and removed part of collar bone       Family History   Problem Relation Age of Onset    Cancer Mother         breast    Heart Disease Mother     Heart Failure Mother     Pacemaker Mother     Cancer Father         prostate x 4    Diabetes Brother     Cancer Brother         prostate    Diabetes Brother     Cancer Daughter         cervical cancer        Social History     Tobacco Use    Smoking status: Never Smoker    Smokeless tobacco: Never Used   Substance Use Topics    Alcohol use: No     Alcohol/week: 0.0 standard drinks    Drug use: No        Review of Systems   All other systems reviewed and are negative. Physical Exam    Both hands in general demonstrate good range of motion. At rest the patient denied any pain and had no crepitation with circumduction testing. There are well-healed scars of the dorsal base of each thumb CMC joint. No digital clicking or locking. Good sensation refill to fingers. It is more with activity on the right side that he does occasionally describe some pain. Imaging:    XR Results (most recent):  Results from Appointment encounter on 03/08/22    XR HAND BILAT AP LAT OBL (MIN 3 V)    Narrative  AP, lateral and oblique x-ray of both hands were obtained.   His left thumb has had a prior [facility] arthroplasty with fairly significant proximal migration . He has had what appears to represent a partial trapezium resection with interpositional tendon grafting. The right side is undergone a thumb CMC arthroplasty with button technique is a revision procedure and there is still some but less proximal migration. Overall the IP joint spaces appear void of any advanced arthritis. There is calcification of the radial arteries and ulnar to the wrist level and radially to just past the thumb level. ASSESSMENT/PLAN:  Below is the assessment and plan developed based on review of pertinent history, physical exam, labs, studies, and medications. Patient has undergone staged bilateral thumb CMC arthroplasties and overall is doing very well. He occasionally has some discomfort in the right side. This had undergone a CMC arthroplasty with suspension plasty including mini tight rope technique 2 to 3 years ago. He has been offered but deferred an injection on either side. He deferred an oral steroid pack. He will try to be more cautious with strenuous activities and obtain a hand-based thumb opponens splint for comfort and support to be worn for more strenuous work. He otherwise is very pleased with his overall outcome and may return anytime for further treatment. He was seen with his wife and questions answered. 1. Right hand pain  2. Primary osteoarthritis of both hands  3. Primary osteoarthritis of both first carpometacarpal joints  -     XR HAND BILAT AP LAT OBL (MIN 3 V); Future      Return if symptoms worsen or fail to improve. An electronic signature was used to authenticate this note.   -- Rosemarie Subramanian MD

## 2022-03-08 ENCOUNTER — OFFICE VISIT (OUTPATIENT)
Dept: ORTHOPEDIC SURGERY | Age: 72
End: 2022-03-08
Payer: MEDICARE

## 2022-03-08 VITALS — BODY MASS INDEX: 30.52 KG/M2 | WEIGHT: 218 LBS | HEIGHT: 71 IN

## 2022-03-08 DIAGNOSIS — M79.641 RIGHT HAND PAIN: Primary | ICD-10-CM

## 2022-03-08 DIAGNOSIS — M19.041 PRIMARY OSTEOARTHRITIS OF BOTH HANDS: ICD-10-CM

## 2022-03-08 DIAGNOSIS — M19.042 PRIMARY OSTEOARTHRITIS OF BOTH HANDS: ICD-10-CM

## 2022-03-08 DIAGNOSIS — M18.0 PRIMARY OSTEOARTHRITIS OF BOTH FIRST CARPOMETACARPAL JOINTS: ICD-10-CM

## 2022-03-08 PROCEDURE — G8427 DOCREV CUR MEDS BY ELIG CLIN: HCPCS | Performed by: ORTHOPAEDIC SURGERY

## 2022-03-08 PROCEDURE — 3017F COLORECTAL CA SCREEN DOC REV: CPT | Performed by: ORTHOPAEDIC SURGERY

## 2022-03-08 PROCEDURE — G8536 NO DOC ELDER MAL SCRN: HCPCS | Performed by: ORTHOPAEDIC SURGERY

## 2022-03-08 PROCEDURE — G8432 DEP SCR NOT DOC, RNG: HCPCS | Performed by: ORTHOPAEDIC SURGERY

## 2022-03-08 PROCEDURE — 99213 OFFICE O/P EST LOW 20 MIN: CPT | Performed by: ORTHOPAEDIC SURGERY

## 2022-03-08 PROCEDURE — 1101F PT FALLS ASSESS-DOCD LE1/YR: CPT | Performed by: ORTHOPAEDIC SURGERY

## 2022-03-08 PROCEDURE — G8417 CALC BMI ABV UP PARAM F/U: HCPCS | Performed by: ORTHOPAEDIC SURGERY

## 2022-03-08 RX ORDER — DIPHENOXYLATE HYDROCHLORIDE AND ATROPINE SULFATE 2.5; .025 MG/1; MG/1
TABLET ORAL
COMMUNITY
End: 2022-05-18

## 2022-03-08 NOTE — LETTER
3/8/2022    Patient: Rosalind Rivera   YOB: 1950   Date of Visit: 3/8/2022     Paulo Glynn MD  [address] [place] [address] Rd  [address] [address]  Via Fax: [phone]    Dear Paulo Glynn MD,      Thank you for referring Mr. Jf Anderson to [facility] for evaluation. My notes for this consultation are attached. If you have questions, please do not hesitate to call me. I look forward to following your patient along with you.       Sincerely,    Katja Travis MD

## 2022-03-08 NOTE — PATIENT INSTRUCTIONS
Learning About Arthritis at the [facility] of the Thumb  What is it? Arthritis at the base of the thumb joint is wear and tear on the cartilage. Cartilage is a firm, thick, slippery tissue. It covers and protects the ends of bones where they meet to form a joint. When you have arthritis, there are changes in the cartilage that cause it to break down. The bones rub together and cause joint damage and pain. What causes it? Experts don't know what causes arthritis at the base of the thumb. But aging, a lot of use, an injury, or family history may play a part. What are the symptoms? Symptoms of arthritis at the base of the thumb include aching in your joint. Or the pain may feel burning or sharp. You may feel clicking, creaking, or catching in the joint. It may get stiff. You may have more pain and less strength when you pinch or  things. Symptoms may come and go, stay the same, or get worse over time. How is it diagnosed? Your doctor can often diagnose arthritis by asking you questions about your joint pain and other symptoms and examining you. You may also have X-rays and blood tests. Blood tests can help make sure another disease isn't causing your symptoms. How is it treated? Arthritis at the base of your thumb may be treated with rest, pain relievers, steroid medicines, using a brace or splint, and--in some cases--surgery. To help relieve pain in the joint, rest your sore hand. Switch hands for some activities. You can try heat and cold therapy, such as hot compresses, paraffin wax, cold packs, or ice massage. Your doctor may give you a splint to wear during some activities or when pain flares up. You can often manage mild or moderate arthritis pain with over-the-counter pain relievers. These include medicines that reduce swelling, such as ibuprofen or naproxen. You can also use acetaminophen. Sometimes these medicines are in creams that you can rub on your thumb and hand.  Your doctor may also prescribe other medicine for your pain. For some people, steroid shots may be an option. If none of the treatments work, your doctor may discuss surgery with you. Follow-up care is a key part of your treatment and safety. Be sure to make and go to all appointments, and call your doctor if you are having problems. It's also a good idea to know your test results and keep a list of the medicines you take. Where can you learn more? Go to http://www.gray.com/  Enter T110 in the search box to learn more about \"Learning About Arthritis at the [facility] of the Thumb. \"  Current as of: December 20, 2021               Content Version: 13.2  © 6628-4997 Healthwise, Incorporated. Care instructions adapted under license by [employer] (which disclaims liability or warranty for this information). If you have questions about a medical condition or this instruction, always ask your healthcare professional. [address] any warranty or liability for your use of this information.

## 2022-04-01 ENCOUNTER — TRANSCRIBE ORDER (OUTPATIENT)
Dept: REGISTRATION | Age: 72
End: 2022-04-01

## 2022-04-01 ENCOUNTER — HOSPITAL ENCOUNTER (OUTPATIENT)
Dept: PREADMISSION TESTING | Age: 72
Discharge: HOME | End: 2022-04-01
Attending: INTERNAL MEDICINE
Payer: MEDICARE

## 2022-04-01 DIAGNOSIS — U07.1 COVID-19: Primary | ICD-10-CM

## 2022-04-01 DIAGNOSIS — U07.1 COVID-19: ICD-10-CM

## 2022-04-01 PROCEDURE — U0005 INFEC AGEN DETEC AMPLI PROBE: HCPCS

## 2022-04-02 LAB
SARS-COV-2, XPLCVT: NOT DETECTED
SOURCE, COVRS: NORMAL

## 2022-04-06 ENCOUNTER — HOSPITAL ENCOUNTER (OUTPATIENT)
Age: 72
Setting detail: OUTPATIENT SURGERY
Discharge: HOME | End: 2022-04-06
Attending: INTERNAL MEDICINE | Admitting: INTERNAL MEDICINE
Payer: MEDICARE

## 2022-04-06 ENCOUNTER — ANESTHESIA EVENT (OUTPATIENT)
Dept: ENDOSCOPY | Age: 72
End: 2022-04-06
Payer: MEDICARE

## 2022-04-06 ENCOUNTER — ANESTHESIA (OUTPATIENT)
Dept: ENDOSCOPY | Age: 72
End: 2022-04-06
Payer: MEDICARE

## 2022-04-06 VITALS
RESPIRATION RATE: 17 BRPM | HEIGHT: 70 IN | HEART RATE: 53 BPM | SYSTOLIC BLOOD PRESSURE: 129 MMHG | DIASTOLIC BLOOD PRESSURE: 67 MMHG | WEIGHT: 213 LBS | TEMPERATURE: 98.4 F | OXYGEN SATURATION: 96 % | BODY MASS INDEX: 30.49 KG/M2

## 2022-04-06 PROCEDURE — 88305 TISSUE EXAM BY PATHOLOGIST: CPT

## 2022-04-06 PROCEDURE — 2709999900 HC NON-CHARGEABLE SUPPLY: Performed by: INTERNAL MEDICINE

## 2022-04-06 PROCEDURE — 76060000032 HC ANESTHESIA 0.5 TO 1 HR: Performed by: INTERNAL MEDICINE

## 2022-04-06 PROCEDURE — 76040000007: Performed by: INTERNAL MEDICINE

## 2022-04-06 PROCEDURE — 77030040684 HC NDL ENDOSC NEEDLEMASTR OCOA -B: Performed by: INTERNAL MEDICINE

## 2022-04-06 PROCEDURE — 77030019988 HC FCPS ENDOSC DISP BSC -B: Performed by: INTERNAL MEDICINE

## 2022-04-06 PROCEDURE — 74011000250 HC RX REV CODE- 250: Performed by: NURSE ANESTHETIST, CERTIFIED REGISTERED

## 2022-04-06 PROCEDURE — 74011250636 HC RX REV CODE- 250/636: Performed by: NURSE ANESTHETIST, CERTIFIED REGISTERED

## 2022-04-06 RX ORDER — ATROPINE SULFATE 0.1 MG/ML
0.5 INJECTION INTRAVENOUS
Status: DISCONTINUED | OUTPATIENT
Start: 2022-04-06 | End: 2022-04-06 | Stop reason: HOSPADM

## 2022-04-06 RX ORDER — SODIUM CHLORIDE 9 MG/ML
75 INJECTION, SOLUTION INTRAVENOUS CONTINUOUS
Status: DISCONTINUED | OUTPATIENT
Start: 2022-04-06 | End: 2022-04-06 | Stop reason: HOSPADM

## 2022-04-06 RX ORDER — SODIUM CHLORIDE 9 MG/ML
INJECTION, SOLUTION INTRAVENOUS
Status: DISCONTINUED | OUTPATIENT
Start: 2022-04-06 | End: 2022-04-06 | Stop reason: HOSPADM

## 2022-04-06 RX ORDER — LIDOCAINE HYDROCHLORIDE 20 MG/ML
INJECTION, SOLUTION EPIDURAL; INFILTRATION; INTRACAUDAL; PERINEURAL AS NEEDED
Status: DISCONTINUED | OUTPATIENT
Start: 2022-04-06 | End: 2022-04-06 | Stop reason: HOSPADM

## 2022-04-06 RX ORDER — SODIUM CHLORIDE 0.9 % (FLUSH) 0.9 %
5-40 SYRINGE (ML) INJECTION AS NEEDED
Status: DISCONTINUED | OUTPATIENT
Start: 2022-04-06 | End: 2022-04-06 | Stop reason: HOSPADM

## 2022-04-06 RX ORDER — EPINEPHRINE 0.1 MG/ML
1 INJECTION INTRACARDIAC; INTRAVENOUS
Status: DISCONTINUED | OUTPATIENT
Start: 2022-04-06 | End: 2022-04-06 | Stop reason: HOSPADM

## 2022-04-06 RX ORDER — FLUMAZENIL 0.1 MG/ML
0.2 INJECTION INTRAVENOUS
Status: DISCONTINUED | OUTPATIENT
Start: 2022-04-06 | End: 2022-04-06 | Stop reason: HOSPADM

## 2022-04-06 RX ORDER — PROPOFOL 10 MG/ML
INJECTION, EMULSION INTRAVENOUS AS NEEDED
Status: DISCONTINUED | OUTPATIENT
Start: 2022-04-06 | End: 2022-04-06 | Stop reason: HOSPADM

## 2022-04-06 RX ORDER — MIDAZOLAM HYDROCHLORIDE 1 MG/ML
.25-5 INJECTION, SOLUTION INTRAMUSCULAR; INTRAVENOUS
Status: DISCONTINUED | OUTPATIENT
Start: 2022-04-06 | End: 2022-04-06 | Stop reason: HOSPADM

## 2022-04-06 RX ORDER — FENTANYL CITRATE 50 UG/ML
12.5-2 INJECTION, SOLUTION INTRAMUSCULAR; INTRAVENOUS
Status: DISCONTINUED | OUTPATIENT
Start: 2022-04-06 | End: 2022-04-06 | Stop reason: HOSPADM

## 2022-04-06 RX ORDER — NALOXONE HYDROCHLORIDE 0.4 MG/ML
0.4 INJECTION, SOLUTION INTRAMUSCULAR; INTRAVENOUS; SUBCUTANEOUS
Status: DISCONTINUED | OUTPATIENT
Start: 2022-04-06 | End: 2022-04-06 | Stop reason: HOSPADM

## 2022-04-06 RX ORDER — DEXTROMETHORPHAN/PSEUDOEPHED 2.5-7.5/.8
1.2 DROPS ORAL
Status: DISCONTINUED | OUTPATIENT
Start: 2022-04-06 | End: 2022-04-06 | Stop reason: HOSPADM

## 2022-04-06 RX ORDER — SODIUM CHLORIDE 0.9 % (FLUSH) 0.9 %
5-40 SYRINGE (ML) INJECTION EVERY 8 HOURS
Status: DISCONTINUED | OUTPATIENT
Start: 2022-04-06 | End: 2022-04-06 | Stop reason: HOSPADM

## 2022-04-06 RX ADMIN — PROPOFOL 40 MG: 10 INJECTION, EMULSION INTRAVENOUS at 08:57

## 2022-04-06 RX ADMIN — PROPOFOL 40 MG: 10 INJECTION, EMULSION INTRAVENOUS at 09:06

## 2022-04-06 RX ADMIN — PROPOFOL 40 MG: 10 INJECTION, EMULSION INTRAVENOUS at 09:12

## 2022-04-06 RX ADMIN — PROPOFOL 50 MG: 10 INJECTION, EMULSION INTRAVENOUS at 09:02

## 2022-04-06 RX ADMIN — PROPOFOL 50 MG: 10 INJECTION, EMULSION INTRAVENOUS at 09:00

## 2022-04-06 RX ADMIN — LIDOCAINE HYDROCHLORIDE 100 MG: 20 INJECTION, SOLUTION EPIDURAL; INFILTRATION; INTRACAUDAL; PERINEURAL at 08:55

## 2022-04-06 RX ADMIN — SODIUM CHLORIDE: 900 INJECTION, SOLUTION INTRAVENOUS at 08:52

## 2022-04-06 RX ADMIN — PROPOFOL 90 MG: 10 INJECTION, EMULSION INTRAVENOUS at 08:55

## 2022-04-06 RX ADMIN — PROPOFOL 40 MG: 10 INJECTION, EMULSION INTRAVENOUS at 09:09

## 2022-04-06 RX ADMIN — PROPOFOL 50 MG: 10 INJECTION, EMULSION INTRAVENOUS at 09:14

## 2022-04-06 NOTE — PERIOP NOTES

## 2022-04-06 NOTE — DISCHARGE INSTRUCTIONS
[address] [facility], [address] [place] [address]          Yessenia Lundberg  [id number]  1950    COLON DISCHARGE INSTRUCTIONS    DISCOMFORT:  Redness at IV site- apply warm compress to area; if redness or soreness persist- contact your physician  There may be a slight amount of blood passed from the rectum  Gaseous discomfort- walking, belching will help relieve any discomfort    DIET:   High Fiber diet. ACTIVITY:  You may resume your normal daily activities it is recommended that you spend the remainder of the day resting -  avoid any strenuous activity. You may not operate a vehicle for 12 hours  You may not engage in an occupation involving machinery or appliances for rest of today  You may not drink alcoholic beverages for at least 12 hours  Avoid making any critical decisions for at least 24 hour    CALL M.D. ANY SIGN OF:   Increasing pain, nausea, vomiting  Abdominal distension (swelling)  New increased bleeding (oral or rectal)  Fever (chills)  Pain in chest area  Bloody discharge from nose or mouth  Shortness of breath     Follow-up Instructions:   Call Dr. Tish Cole for any questions or problems. Telephone # [phone]  Biopsy results will be available in  5 to 7 days    Impression:  -Residual large flat polyp again noted over the ileocecal valve. Unable to lift with methylene blue or grasp with snare due to underlying scarring. Biopsies obtained.   -Large internal hemorrhoids    Recommendations:   -Resume normal medication(s). -Begin fiber supplement daily   -High fiber diet.  -Await pathology. -Refer to colorectal surgery for possible cecectomy. -Repeat colonoscopy in 3 years.  -Follow up with primary care physician. Tish Cole MD    Learning About Coronavirus [phone])  Coronavirus [phone]): Overview  What is coronavirus (COVID-19)? The coronavirus disease (COVID-19) is caused by a virus.  It is an illness that was first found in [place], [place], in December 2019. It has since spread worldwide. The virus can cause fever, cough, and trouble breathing. In severe cases, it can cause pneumonia and make it hard to breathe without help. It can cause death. Coronaviruses are a large group of viruses. They cause the common cold. They also cause more serious illnesses like Middle East respiratory syndrome (MERS) and severe acute respiratory syndrome (SARS). COVID-19 is caused by a novel coronavirus. That means it's a new type that has not been seen in people before. This virus spreads person-to-person through droplets from coughing and sneezing. It can also spread when you are close to someone who is infected. And it can spread when you touch something that has the virus on it, such as a doorknob or a tabletop. What can you do to protect yourself from coronavirus (COVID-19)? The best way to protect yourself from getting sick is to:  · Avoid areas where there is an outbreak. · Avoid contact with people who may be infected. · Wash your hands often with soap or alcohol-based hand sanitizers. · Avoid crowds and try to stay at least 6 feet away from other people. · Wash your hands often, especially after you cough or sneeze. Use soap and water, and scrub for at least 20 seconds. If soap and water aren't available, use an alcohol-based hand . · Avoid touching your mouth, nose, and eyes. What can you do to avoid spreading the virus to others? To help avoid spreading the virus to others:  · Cover your mouth with a tissue when you cough or sneeze. Then throw the tissue in the trash. · Use a disinfectant to clean things that you touch often. · Stay home if you are sick or have been exposed to the virus. Don't go to school, work, or public areas. And don't use public transportation. · If you are sick:  ? Leave your home only if you need to get medical care. But call the doctor's office first so they know you're coming.  And wear a face mask, if you have one.  ? If you have a face mask, wear it whenever you're around other people. It can help stop the spread of the virus when you cough or sneeze. ? Clean and disinfect your home every day. Use household  and disinfectant wipes or sprays. Take special care to clean things that you grab with your hands. These include doorknobs, remote controls, phones, and handles on your refrigerator and microwave. And don't forget countertops, tabletops, bathrooms, and computer keyboards. When to call for help  Call 911 anytime you think you may need emergency care. For example, call if:  · You have severe trouble breathing. (You can't talk at all.)  · You have constant chest pain or pressure. · You are severely dizzy or lightheaded. · You are confused or can't think clearly. · Your face and lips have a blue color. · You pass out (lose consciousness) or are very hard to wake up. Call your doctor now if you develop symptoms such as:  · Shortness of breath. · Fever. · Cough. If you need to get care, call ahead to the doctor's office for instructions before you go. Make sure you wear a face mask, if you have one, to prevent exposing other people to the virus. Where can you get the latest information? The following health organizations are tracking and studying this virus. Their websites contain the most up-to-date information. Patricio Crump also learn what to do if you think you may have been exposed to the virus. · U.S. Centers for Disease Control and Prevention (CDC): The CDC provides updated news about the disease and travel advice. The website also tells you how to prevent the spread of infection. www.cdc.gov  · World Health Organization [facility]): WHO offers information about the virus outbreaks. WHO also has travel advice. www.who.int  Current as of: April 1, 2020               Content Version: 12.4  © 5934-4308 Healthwise, Incorporated.    Care instructions adapted under license by your healthcare professional. If you have questions about a medical condition or this instruction, always ask your healthcare professional. [place] [address] any warranty or liability for your use of this information.

## 2022-04-06 NOTE — ANESTHESIA POSTPROCEDURE EVALUATION
Post-Anesthesia Evaluation and Assessment    Patient: Dereck Ho MRN: [id number]  SSN: [id number]    YOB: 1950  Age: 80 y.o. Sex: male      I have evaluated the patient and they are stable and ready for discharge from the PACU. Cardiovascular Function/Vital Signs  Visit Vitals  /67   Pulse (!) 59   Temp 36.6 °C (97.8 °F)   Resp 24   Ht 5' 10\" (1.778 m)   Wt 96.6 kg (213 lb)   SpO2 94%   BMI 30.56 kg/m²       Patient is status post MAC anesthesia for Procedure(s):  COLONOSCOPY  INJECTION  COLON BIOPSY. Nausea/Vomiting: None    Postoperative hydration reviewed and adequate. Pain:  Pain Scale 1: Numeric (0 - 10) (04/06/22 0804)  Pain Intensity 1: 0 (04/06/22 0804)   Managed    Neurological Status: At baseline    Mental Status, Level of Consciousness: Alert and  oriented to person, place, and time    Pulmonary Status:   O2 Device: CO2 nasal cannula (04/06/22 0920)   Adequate oxygenation and airway patent    Complications related to anesthesia: None    Post-anesthesia assessment completed. No concerns    Signed By: Vimal Gatica MD     April 6, 2022              Procedure(s):  COLONOSCOPY  INJECTION  COLON BIOPSY. MAC    <BSHSIANPOST>    INITIAL Post-op Vital signs:   Vitals Value Taken Time   BP     Temp     Pulse 59 04/06/22 0929   Resp 22 04/06/22 0929   SpO2 95 % 04/06/22 0929   Vitals shown include unvalidated device data.
Detail Level: Detailed

## 2022-04-06 NOTE — H&P
[address]  [address] [facility], [address] [place] [address] [address]kw[address]  [phone]        History and Physical     NAME: Yessenia Lundberg   :  1950   MRN:  [id number]         HPI:  Yessenia Lundberg is a 80 y.o. male here for surveillance colonoscopy after piecemeal removal of polyp 6 months back. Denies new complaints    Past Surgical History:   Procedure Laterality Date    COLONOSCOPY N/A 10/4/2016    COLONOSCOPY performed by Priscila Brown MD at [address] COLONOSCOPY N/A 10/6/2021    COLONOSCOPY   :- performed by Lionel Sams MD at [facility] ENDOSCOPY    HX APPENDECTOMY      HX CATARACT REMOVAL      BILATERAL CATARACT REMOVAL    HX GI      ESOPHAGEAL DILITATION (SEVERAL)    HX HERNIA REPAIR Left     HX ORTHOPAEDIC      left wrist (bone taken out of thumb and a tendon transplanted) and left shoulder - bone spur removed, right knee - meniscus removed and right shoulder x 2- bone spurs x2 and removed part of collar bone     Past Medical History:   Diagnosis Date    Arrhythmia     ATRIAL FIBRILLATION - when thyroid \"went out\" - no problem since thyroid is under control    Arthritis     Bilateral groin pain 2016    GERD (gastroesophageal reflux disease)     Hiatal hernia     Hypertension     Ill-defined condition     KIDNEY STONE    Joint pain     Multiple stiff joints     Muscle ache     Sinus problem     Thyroid disease     radiation tablet     Social History     Tobacco Use    Smoking status: Never Smoker    Smokeless tobacco: Never Used   Substance Use Topics    Alcohol use: No     Alcohol/week: 0.0 standard drinks    Drug use: No     No current facility-administered medications on file prior to encounter. Current Outpatient Medications on File Prior to Encounter   Medication Sig Dispense Refill    nebivoloL (Bystolic) 10 mg tablet Take 10 mg by mouth daily.  pseudoephedrine/acetaminophen (TYLENOL SINUS PO) Take  by mouth as needed.       amLODIPine (NORVASC) 10 mg tablet Take 10 mg by mouth nightly.  SYNTHROID 175 mcg tablet Take 175 mcg by mouth daily. Takes at 2am.      pantoprazole (PROTONIX) 40 mg tablet Take 40 mg by mouth daily as needed.  CETIRIZINE HCL (ZYRTEC PO) Take 10 mg by mouth daily as needed.  ASPIRIN (ASPIR-81 PO) Take 81 mg by mouth daily. Allergies   Allergen Reactions    Codeine Other (comments)     Family History   Problem Relation Age of Onset    Cancer Mother         breast    Heart Disease Mother     Heart Failure Mother     Pacemaker Mother     Cancer Father         prostate x 4    Diabetes Brother     Cancer Brother         prostate    Diabetes Brother     Cancer Daughter         cervical cancer     No current facility-administered medications for this encounter. PHYSICAL EXAM:    BP (!) 148/75   Pulse 62   Temp 97.8 °F (36.6 °C)   Resp 16   Ht 5' 10\" (1.778 m)   Wt 96.6 kg (213 lb)   SpO2 96%   BMI 30.56 kg/m²      General: WD, WN. Alert, cooperative, no acute distress    HEENT: NC, Atraumatic. PERRLA, EOMI. Anicteric sclerae. Lungs:  CTA Bilaterally. No Wheezing/Rhonchi/Rales. Heart:  Regular  rhythm,  No murmur, No Rubs, No Gallops  Abdomen: Soft, obese, Non distended, Non tender. +Bowel sounds, no HSM  Extremities: No c/c/e  Neurologic:  CN 2-12 gi, Alert and oriented X 3. No acute neurological distress   Psych:   Good insight. Not anxious nor agitated.        Assessment:   · Personal h/o colon polyps, last colonoscopy 6 months back    Plan:   · Endoscopic procedure: Colonoscopy  · Anesthesia plan: Monitored Anesthesia Care

## 2022-04-06 NOTE — PROCEDURES
[address], [address] [place] [address]  [phone]               Colonoscopy Operative Report      Indications:  Personal h/o colon polyps. Last colonoscopy 6 mths back with piecemeal removal of large ileocecal valve polyp. :  Carlos A Gallegos MD    Staff: Endoscopy Breonna Nascimento: Daquan Aceves  Endoscopy RN-1: Cheryle Allis, RN     Referring Provider: Evonnie Aase, MD    Sedation:  MAC anesthesia    Procedure Details:  After informed consent was obtained with all risks and benefits of procedure explained and preoperative exam completed, the patient was taken to the endoscopy suite and placed in the left lateral decubitus position. Upon sequential sedation as per above, a digital rectal exam was performed  And was normal.  The Olympus videocolonoscope  was inserted in the rectum and carefully advanced to the cecum, which was identified by the ileocecal valve and appendiceal orifice. The quality of preparation was good. The colonoscope was slowly withdrawn with careful evaluation between folds. Retroflexion in the rectum was performed and was normal..     Findings:   Rectum: Grade 3 internal hemorrhoid(s); Sigmoid: normal  Descending Colon: normal  Transverse Colon: normal  Ascending Colon: normal  Cecum: Residual large flat polyp again noted over the ileocecal valve. Unable to lift with methylene blue or grasp with snare due to underlying scarring. Biopsies obtained. Terminal Ileum: not intubated    Interventions:  biopsy of ileocecal valve polyp  injection of methylene blue     Specimen Removed:   ID Type Source Tests Collected by Time Destination   1 : ileocecal valve biopsy Preservative Colon, Ileocecal valve  Sarah Tinsley MD 4/6/2022 0900 Pathology       EBL:  Minimal    Complications:  None; patient tolerated the procedure well. Impression:  -Residual large flat polyp again noted over the ileocecal valve.  Unable to lift with methylene blue or grasp with snare due to underlying scarring. Biopsies obtained.   -Large internal hemorrhoids    Recommendations:   -Resume normal medication(s). -Begin fiber supplement daily   -High fiber diet.  -Await pathology. -Refer to colorectal surgery for possible cecectomy. -Repeat colonoscopy in 3 years.  -Follow up with primary care physician. Discharge Disposition:  Home in the company of a  when able to ambulate.     Gaurav Busby MD  4/6/2022  9:22 AM

## 2022-05-18 ENCOUNTER — HOSPITAL ENCOUNTER (OUTPATIENT)
Dept: GENERAL RADIOLOGY | Age: 72
Discharge: HOME | End: 2022-05-18
Attending: COLON & RECTAL SURGERY
Payer: MEDICARE

## 2022-05-18 ENCOUNTER — HOSPITAL ENCOUNTER (OUTPATIENT)
Dept: PREADMISSION TESTING | Age: 72
Discharge: HOME | End: 2022-05-18
Payer: MEDICARE

## 2022-05-18 VITALS
TEMPERATURE: 97.5 F | HEART RATE: 50 BPM | WEIGHT: 215.83 LBS | BODY MASS INDEX: 30.9 KG/M2 | SYSTOLIC BLOOD PRESSURE: 154 MMHG | DIASTOLIC BLOOD PRESSURE: 67 MMHG | HEIGHT: 70 IN | RESPIRATION RATE: 18 BRPM

## 2022-05-18 LAB
ABO + RH BLD: NORMAL
ALBUMIN SERPL-MCNC: 3.8 G/DL (ref 3.5–5)
ALBUMIN/GLOB SERPL: 1.2 {RATIO} (ref 1.1–2.2)
ALP SERPL-CCNC: 74 U/L (ref 45–117)
ALT SERPL-CCNC: 26 U/L (ref 12–78)
ANION GAP SERPL CALC-SCNC: 8 MMOL/L (ref 5–15)
AST SERPL-CCNC: 16 U/L (ref 15–37)
BILIRUB SERPL-MCNC: 0.9 MG/DL (ref 0.2–1)
BLOOD GROUP ANTIBODIES SERPL: NORMAL
BUN SERPL-MCNC: 33 MG/DL (ref 6–20)
BUN/CREAT SERPL: 29 (ref 12–20)
CALCIUM SERPL-MCNC: 9.2 MG/DL (ref 8.5–10.1)
CEA SERPL-MCNC: 2 NG/ML
CHLORIDE SERPL-SCNC: 108 MMOL/L (ref 97–108)
CO2 SERPL-SCNC: 24 MMOL/L (ref 21–32)
CREAT SERPL-MCNC: 1.14 MG/DL (ref 0.7–1.3)
ERYTHROCYTE [DISTWIDTH] IN BLOOD BY AUTOMATED COUNT: 13.7 % (ref 11.5–14.5)
EST. AVERAGE GLUCOSE BLD GHB EST-MCNC: 169 MG/DL
GLOBULIN SER CALC-MCNC: 3.1 G/DL (ref 2–4)
GLUCOSE SERPL-MCNC: 206 MG/DL (ref 65–100)
HBA1C MFR BLD: 7.5 % (ref 4–5.6)
HCT VFR BLD AUTO: 47.6 % (ref 36.6–50.3)
HGB BLD-MCNC: 16.6 G/DL (ref 12.1–17)
MCH RBC QN AUTO: 29.6 PG (ref 26–34)
MCHC RBC AUTO-ENTMCNC: 34.9 G/DL (ref 30–36.5)
MCV RBC AUTO: 85 FL (ref 80–99)
NRBC # BLD: 0 K/UL (ref 0–0.01)
NRBC BLD-RTO: 0 PER 100 WBC
PLATELET # BLD AUTO: 176 K/UL (ref 150–400)
PMV BLD AUTO: 10.6 FL (ref 8.9–12.9)
POTASSIUM SERPL-SCNC: 3.7 MMOL/L (ref 3.5–5.1)
PROT SERPL-MCNC: 6.9 G/DL (ref 6.4–8.2)
RBC # BLD AUTO: 5.6 M/UL (ref 4.1–5.7)
SODIUM SERPL-SCNC: 140 MMOL/L (ref 136–145)
SPECIMEN EXP DATE BLD: NORMAL
WBC # BLD AUTO: 9.6 K/UL (ref 4.1–11.1)

## 2022-05-18 PROCEDURE — 82378 CARCINOEMBRYONIC ANTIGEN: CPT

## 2022-05-18 PROCEDURE — 85027 COMPLETE CBC AUTOMATED: CPT

## 2022-05-18 PROCEDURE — 71046 X-RAY EXAM CHEST 2 VIEWS: CPT

## 2022-05-18 PROCEDURE — 80053 COMPREHEN METABOLIC PANEL: CPT

## 2022-05-18 PROCEDURE — 86900 BLOOD TYPING SEROLOGIC ABO: CPT

## 2022-05-18 PROCEDURE — 83036 HEMOGLOBIN GLYCOSYLATED A1C: CPT

## 2022-05-18 PROCEDURE — 36415 COLL VENOUS BLD VENIPUNCTURE: CPT

## 2022-05-18 RX ORDER — ERGOCALCIFEROL 1.25 MG/1
50000 CAPSULE ORAL
COMMUNITY

## 2022-05-18 NOTE — PERIOP NOTES
[address] [facility] INSTRUCTIONS  ERAS    Surgery Date:   6/2/22    [facility] staff will call you between 4 PM- 8 PM the day before surgery with your arrival time. If your surgery is on a Monday, we will call you the preceding Friday. Please call [phone] after 8 PM if you did not receive your arrival time. 1. Please report to [facility] Patient Access/Admitting on the 1st floor. Bring your insurance card, photo identification, and any copayment ( if applicable). 2. If you are going home the same day of your surgery, you must have a responsible adult to drive you home. You need to have a responsible adult to stay with you the first 24 hours after surgery and you should not drive a car for 24 hours following your surgery. 3. If you are being admitted to the hospital, please leave personal belongings/luggage in your car until you have an assigned hospital room number. 4. Please refer to [facility] book for Pre-operative Nutrition Plan. 5. Do NOT drink alcohol or smoke 24 hours before surgery. STOP smoking for 14 days prior as it helps with breathing and healing after surgery. 6. Please wear comfortable clothes. Wear your glasses instead of contacts. We ask that all money, jewelry and valuables be left at home. Wear no make up, particularly mascara, the day of surgery. 7.  All body piercings, rings, and jewelry need to be removed and left at home. Please remove any nail polish or artificial nails from your fingernails. Please wear your hair loose or down. Please no pony-tails, buns, or any metal hair accessories. If you shower the morning of surgery, please do not apply any lotions or powders afterwards. You may wear deodorant. Do not shave any body area within 24 hours of your surgery. 8. Please follow all instructions to avoid any potential surgical cancellation. 9. Should your physical condition change, (i.e. fever, cold, flu, etc.) please notify your surgeon as soon as possible.   10. It is important to be on time. If a situation occurs where you may be delayed, please call:  [phone] / [phone] on the day of surgery. 11. The Preadmission Testing staff can be reached at [phone]. 12. Special instructions: NONE    Current Outpatient Medications   Medication Sig    ergocalciferol (Vitamin D2) 1,250 mcg (50,000 unit) capsule Take 50,000 Units by mouth every seven (7) days. ON TUESDAY    nebivoloL (Bystolic) 10 mg tablet Take 10 mg by mouth daily.  amLODIPine (NORVASC) 10 mg tablet Take 10 mg by mouth nightly.  SYNTHROID 175 mcg tablet Take 175 mcg by mouth daily. Takes at 2am.    pantoprazole (PROTONIX) 40 mg tablet Take 40 mg by mouth daily.  CETIRIZINE HCL (ZYRTEC PO) Take 10 mg by mouth daily as needed.  ASPIRIN (ASPIR-81 PO) Take 81 mg by mouth daily. No current facility-administered medications for this encounter. 1. YOU MUST ONLY TAKE THESE MEDICATIONS THE MORNING OF SURGERY WITH A SIP OF WATER: BYSTOLIC,   SYNTHROID,  PANTOPRAZOLE  2. MEDICATIONS TO TAKE THE MORNING OF SURGERY ONLY IF NEEDED: NONE  3. HOLD these prescription medications BEFORE surgery: NONE  4. Ask your surgeon/prescribing physician about when/if to STOP taking these medications: DR. ROACH ABOUT STOPPING ASPIRIN PRIOR TO SURGERY  5. Stop all vitamins, herbal medicines and Aspirin containing products 7 days prior to surgery. Stop any non-steroidal anti-inflammatory drugs (i.e. Ibuprofen, Naproxen, Advil, Aleve) 3 days before surgery. You may take Tylenol. 6. If you are currently taking Aspirin, Plavix, Coumadin or any other blood-thinning/anticoagulant medication contact your prescribing physician for instructions. Eating and Drinking Before Surgery     You may eat a regular dinner at the usual time on the day before your surgery.  Do NOT eat any solid foods after midnight unless your arrival time at the hospital is 3pm or later.    You may drink clear liquids only from 12 midnight until 1 hour prior to your arrival time at the hospital on the day of your surgery. Do NOT drink alcohol.  Clear liquids include:  o Water  o Fruit juices without pulp( i.e. apple juice)  o Carbonated beverages  o Black coffee (no cream/milk)  o Tea (no cream/milk)  o Gatorade   You may drink up to 12-16 ounces at one time every 4 hours between the hours of midnight and 1 hour before your arrival time at the hospital. Example- if your arrival time at the hospital is 6am, you may drink 12-16 ounces of clear liquids no later than 5am.   If your arrival time at the hospital is 3pm or later, you may eat a light breakfast before 8am.   A light breakfast includes:  o Toast or bagel (no butter)  o Black coffee (no cream/milk)  o Tea (no cream/milk)  o Fruit juices without pulp ( i.e. apple juice)  o Do NOT eat meat, eggs, vegetables or fruit   If you have any questions, please contact your surgeon's office. Incentive Spirometer        Using the incentive spirometer helps expand the small air sacs of your lungs, helps you breathe deeply, and helps improve your lung function. Use your incentive spirometer twice a day (10 breaths each time) prior to surgery. How to Use Your Incentive Spirometer:  1. Hold the incentive spirometer in an upright position. 2. Breathe out as usual.   3. Place the mouthpiece in your mouth and seal your lips tightly around it. 4. Take a deep breath. Breathe in slowly and as deeply as possible. Keep the blue flow rate guide between the arrows. 5. Hold your breath as long as possible. Then exhale slowly and allow the piston to fall to the bottom of the column. 6. Rest for a few seconds and repeat steps one through five at least 10 times. Opportunity given to ask and answer questions. Preventing Infections Before and After - Your Surgery    IMPORTANT INSTRUCTIONS    You play an important role in your health and preparation for surgery.  To reduce the germs on your skin you will need to shower with CHG soap (Chorhexidine gluconate 4%) two times before surgery. CHG soap (Hibiclens, Hex-A-Clens or store brand)   CHG soap will be provided at your Preadmission Testing (PAT) appointment.  If you do not have a PAT appointment before surgery, you may arrange to  CHG soap from our office or purchase CHG soap at a pharmacy, grocery or department store.  You need to purchase TWO 4 ounce bottles to use for your 2 showers. Steps to follow:  1. Wash your hair with your normal shampoo and your body with regular soap and rinse well to remove shampoo and soap from your skin. 2. Wet a clean washcloth and turn off the shower. 3. Put CHG soap on washcloth and apply to your entire body from the neck down. Do not use on your head, face or private parts(genitals). Do not use CHG soap on open sores, wounds or areas of skin irritation. 4. Wash you body gently for 5 minutes. Do not wash your skin too hard. This soap does not create lather. Pay special attention to your underarms and from your belly button to your feet. 5. Turn the shower back on and rinse well to get CHG soap off your body. 6. Pat your skin dry with a clean, dry towel. Do not apply lotions or moisturizer. 7. Put on clean clothes and sleep on fresh bed sheets and do not allow pets to sleep with you. Shower with CHG soap 2 times before your surgery   The evening before your surgery   The morning of your surgery      Tips to help prevent infections after your surgery:  1. Protect your surgical wound from germs:  ? Hand washing is the most important thing you and your caregivers can do to prevent infections. ? Keep your bandage clean and dry! ? Do not touch your surgical wound. 2. Use clean, freshly washed towels and washcloths every time you shower; do not share bath linens with others.   3. Until your surgical wound is healed, wear clothing and sleep on bed linens each day that are clean and freshly washed. 4. Do not allow pets to sleep in your bed with you or touch your surgical wound. 5. Do not smoke - smoking delays wound healing. This may be a good time to stop smoking. 6. If you have diabetes, it is important for you to manage your blood sugar levels properly before your surgery as well as after your surgery. Poorly managed blood sugar levels slow down wound healing and prevent you from healing completely. Patient Information Regarding COVID Restrictions    Day of Procedure     Please park in the parking deck or any designated visitor parking lot.  Enter the facility through the [place] [facility] the [facility].   On the day of surgery, please provide the cell phone number of the person who will be waiting for you to the Patient Access representative at the time of registration.  Please wear a mask on the day of your procedure.  We are now allowing two designated visitors per stay. Pediatric patients may have 2 designated visitors. These two people may come in with you on the day of your procedure.  No visitors under the age of 13.  The designated visitor must also wear a mask.  Once your procedure and the immediate recovery period is completed, a nurse in the recovery area will contact your designated visitor to inform them of your room number or to otherwise review other pertinent information regarding your care.  Social distancing practices are to be adhered to in waiting areas and the cafeteria. The patient and spouse was contacted in person. They verbalized understanding of all instructions does not  need reinforcement. PT DENIES THAT HE IS HAVING A COLOSTOMY.   DIRECTIONS TO HAVE CXR COMPLETED GIVEN TO PT.

## 2022-05-19 NOTE — PERIOP NOTES
DR Omar Awad (ANESTHESIA) CALLED RE EKG ON CHART FROM CARDIOLOGIST, DATED 2/25/2022 WHICH SHOWED POSSIBLE INFERIOR AND ANTEROLATERAL ISCHEMIA. CARDIOLOGIST (DR ROACH) NOTES FROM 2/25/2022 GIVE CLEARANCE FOR FOOT SURGERY PT WAS TO HAVE THE WEEK FOLLOWING THAT CARDIOLOGY APPT. DR Chirag Rose PT OK FOR THIS SURGERY BASED ON THAT INFORMATION. INFORMED FLOR AT DR Talita Rayo OFFICE OF ELEVATED GLUCOSE (206) AND HGB A1C (7.5). ALSO NOTIFIED HER OF BUN 33. STATED SHE WOULD LET DR KNOW.

## 2022-05-19 NOTE — PERIOP NOTES
Preoperative Nutrition Screen (RAJENDRA)   Patient's Age: 80 y.o. Patient's BMI: Estimated body mass index is 30.97 kg/m² as calculated from the following:    Height as of this encounter: 5' 10\" (1.778 m). Weight as of this encounter: 97.9 kg (215 lb 13.3 oz). If the answer to any of the following is Yes, then recommend prescribe Oral Nutrition Supplements (ONS) for at least 7 days prior to surgery and/or order referral to dietitian for further assessment and nutrition therapy. 1. Does the patient have a documented serum albumin less than 3.0 within the last 90 days? No = 0          2. Is patient's BMI less than 18.5 (or less than 20 if age over 72)? No = 0        3. Has the patient had an unplanned weight loss of 10% of body weight or more in the last 6 months? No = 0   4. Has the patient been eating less than 50% of their normal diet in the preceding week?  No = 0   RAJENDRA Score (number of Yes responses), 0-4 0     Plan:   No Nutrition Intervention indicated    Electronically signed by Monica Davis RN on 5/19/22 at 10:01 AM    PRE OP PROTEIN DRINKS AND CARB LOADING DRINKS GIVEN TO PT

## 2022-06-01 ENCOUNTER — ANESTHESIA EVENT (OUTPATIENT)
Dept: SURGERY | Age: 72
DRG: 330 | End: 2022-06-01
Payer: MEDICARE

## 2022-06-02 ENCOUNTER — HOSPITAL ENCOUNTER (INPATIENT)
Age: 72
LOS: 6 days | Discharge: HOME | DRG: 330 | End: 2022-06-08
Attending: COLON & RECTAL SURGERY | Admitting: COLON & RECTAL SURGERY
Payer: MEDICARE

## 2022-06-02 ENCOUNTER — ANESTHESIA (OUTPATIENT)
Dept: SURGERY | Age: 72
DRG: 330 | End: 2022-06-02
Payer: MEDICARE

## 2022-06-02 PROBLEM — K63.5 COLONIC POLYP: Status: ACTIVE | Noted: 2022-06-02

## 2022-06-02 PROBLEM — K63.5 COLONIC POLYP: Chronic | Status: ACTIVE | Noted: 2022-06-02

## 2022-06-02 LAB
ABO + RH BLD: NORMAL
ANION GAP SERPL CALC-SCNC: 9 MMOL/L (ref 5–15)
BLOOD GROUP ANTIBODIES SERPL: NORMAL
BUN SERPL-MCNC: 15 MG/DL (ref 6–20)
BUN/CREAT SERPL: 8 (ref 12–20)
CALCIUM SERPL-MCNC: 8 MG/DL (ref 8.5–10.1)
CHLORIDE SERPL-SCNC: 107 MMOL/L (ref 97–108)
CO2 SERPL-SCNC: 20 MMOL/L (ref 21–32)
CREAT SERPL-MCNC: 1.77 MG/DL (ref 0.7–1.3)
ERYTHROCYTE [DISTWIDTH] IN BLOOD BY AUTOMATED COUNT: 13.8 % (ref 11.5–14.5)
GLUCOSE BLD STRIP.AUTO-MCNC: 236 MG/DL (ref 65–117)
GLUCOSE SERPL-MCNC: 263 MG/DL (ref 65–100)
HBV SURFACE AG SER QL: <0.1 INDEX
HBV SURFACE AG SER QL: NEGATIVE
HCT VFR BLD AUTO: 41.5 % (ref 36.6–50.3)
HCV AB SERPL QL IA: NONREACTIVE
HGB BLD-MCNC: 14.7 G/DL (ref 12.1–17)
HIV1 P24 AG SERPL QL IA: NONREACTIVE
HIV1+2 AB SERPL QL IA: NONREACTIVE
MAGNESIUM SERPL-MCNC: 2.5 MG/DL (ref 1.6–2.4)
MCH RBC QN AUTO: 30 PG (ref 26–34)
MCHC RBC AUTO-ENTMCNC: 35.4 G/DL (ref 30–36.5)
MCV RBC AUTO: 84.7 FL (ref 80–99)
NRBC # BLD: 0 K/UL (ref 0–0.01)
NRBC BLD-RTO: 0 PER 100 WBC
PHOSPHATE SERPL-MCNC: 3.8 MG/DL (ref 2.6–4.7)
PLATELET # BLD AUTO: 143 K/UL (ref 150–400)
PMV BLD AUTO: 9.8 FL (ref 8.9–12.9)
POTASSIUM SERPL-SCNC: 4.2 MMOL/L (ref 3.5–5.1)
RBC # BLD AUTO: 4.9 M/UL (ref 4.1–5.7)
SERVICE CMNT-IMP: ABNORMAL
SODIUM SERPL-SCNC: 136 MMOL/L (ref 136–145)
SPECIMEN EXP DATE BLD: NORMAL
WBC # BLD AUTO: 13.5 K/UL (ref 4.1–11.1)

## 2022-06-02 PROCEDURE — 74011250636 HC RX REV CODE- 250/636: Performed by: COLON & RECTAL SURGERY

## 2022-06-02 PROCEDURE — 0DTF0ZZ RESECTION OF RIGHT LARGE INTESTINE, OPEN APPROACH: ICD-10-PCS | Performed by: COLON & RECTAL SURGERY

## 2022-06-02 PROCEDURE — 0T788DZ DILATION OF BILATERAL URETERS WITH INTRALUMINAL DEVICE, VIA NATURAL OR ARTIFICIAL OPENING ENDOSCOPIC: ICD-10-PCS | Performed by: STUDENT IN AN ORGANIZED HEALTH CARE EDUCATION/TRAINING PROGRAM

## 2022-06-02 PROCEDURE — 77030008756 HC TU IRR SUC STRY -B: Performed by: COLON & RECTAL SURGERY

## 2022-06-02 PROCEDURE — 84100 ASSAY OF PHOSPHORUS: CPT

## 2022-06-02 PROCEDURE — 85027 COMPLETE CBC AUTOMATED: CPT

## 2022-06-02 PROCEDURE — 77030009979 HC RELD STPLR TCR J&J -C: Performed by: COLON & RECTAL SURGERY

## 2022-06-02 PROCEDURE — 80048 BASIC METABOLIC PNL TOTAL CA: CPT

## 2022-06-02 PROCEDURE — 0WQF0ZZ REPAIR ABDOMINAL WALL, OPEN APPROACH: ICD-10-PCS | Performed by: COLON & RECTAL SURGERY

## 2022-06-02 PROCEDURE — 74011250636 HC RX REV CODE- 250/636: Performed by: ANESTHESIOLOGY

## 2022-06-02 PROCEDURE — 74011000250 HC RX REV CODE- 250: Performed by: NURSE ANESTHETIST, CERTIFIED REGISTERED

## 2022-06-02 PROCEDURE — 77030002996 HC SUT SLK J&J -A: Performed by: COLON & RECTAL SURGERY

## 2022-06-02 PROCEDURE — 77030042556 HC PNCL CAUT -B: Performed by: COLON & RECTAL SURGERY

## 2022-06-02 PROCEDURE — 77030025625 HC DEV TISS SEAL J&J -F: Performed by: COLON & RECTAL SURGERY

## 2022-06-02 PROCEDURE — 77030002933 HC SUT MCRYL J&J -A: Performed by: COLON & RECTAL SURGERY

## 2022-06-02 PROCEDURE — 77030019702 HC WRP THER MENM -C: Performed by: COLON & RECTAL SURGERY

## 2022-06-02 PROCEDURE — 74011250637 HC RX REV CODE- 250/637: Performed by: COLON & RECTAL SURGERY

## 2022-06-02 PROCEDURE — 88309 TISSUE EXAM BY PATHOLOGIST: CPT

## 2022-06-02 PROCEDURE — 77030008771 HC TU NG SALEM SUMP -A: Performed by: ANESTHESIOLOGY

## 2022-06-02 PROCEDURE — 77030005513 HC CATH URETH FOL11 MDII -B: Performed by: COLON & RECTAL SURGERY

## 2022-06-02 PROCEDURE — 0D1B0Z4 BYPASS ILEUM TO CUTANEOUS, OPEN APPROACH: ICD-10-PCS | Performed by: COLON & RECTAL SURGERY

## 2022-06-02 PROCEDURE — 82962 GLUCOSE BLOOD TEST: CPT

## 2022-06-02 PROCEDURE — 77030040361 HC SLV COMPR DVT MDII -B: Performed by: COLON & RECTAL SURGERY

## 2022-06-02 PROCEDURE — 76010000139 HC OR TIME 5.5 TO 6 HR: Performed by: COLON & RECTAL SURGERY

## 2022-06-02 PROCEDURE — C1758 CATHETER, URETERAL: HCPCS | Performed by: COLON & RECTAL SURGERY

## 2022-06-02 PROCEDURE — 77030031139 HC SUT VCRL2 J&J -A: Performed by: COLON & RECTAL SURGERY

## 2022-06-02 PROCEDURE — 77030009963 HC RELD STPLR ECR J&J -C: Performed by: COLON & RECTAL SURGERY

## 2022-06-02 PROCEDURE — 77030039895 HC SYST SMK EVAC LAP COVD -B: Performed by: COLON & RECTAL SURGERY

## 2022-06-02 PROCEDURE — 74011250636 HC RX REV CODE- 250/636: Performed by: NURSE ANESTHETIST, CERTIFIED REGISTERED

## 2022-06-02 PROCEDURE — 0DBU0ZZ EXCISION OF OMENTUM, OPEN APPROACH: ICD-10-PCS | Performed by: COLON & RECTAL SURGERY

## 2022-06-02 PROCEDURE — 65270000032 HC RM SEMIPRIVATE

## 2022-06-02 PROCEDURE — 77030002966 HC SUT PDS J&J -A: Performed by: COLON & RECTAL SURGERY

## 2022-06-02 PROCEDURE — 77030011808 HC STPLR ENDOSCOPIC J&J -D: Performed by: COLON & RECTAL SURGERY

## 2022-06-02 PROCEDURE — 36415 COLL VENOUS BLD VENIPUNCTURE: CPT

## 2022-06-02 PROCEDURE — 77030010507 HC ADH SKN DERMBND J&J -B: Performed by: COLON & RECTAL SURGERY

## 2022-06-02 PROCEDURE — 77030016151 HC PROTCTR LNS DFOG COVD -B: Performed by: COLON & RECTAL SURGERY

## 2022-06-02 PROCEDURE — 2709999900 HC NON-CHARGEABLE SUPPLY: Performed by: COLON & RECTAL SURGERY

## 2022-06-02 PROCEDURE — 74011000250 HC RX REV CODE- 250: Performed by: COLON & RECTAL SURGERY

## 2022-06-02 PROCEDURE — C1765 ADHESION BARRIER: HCPCS | Performed by: COLON & RECTAL SURGERY

## 2022-06-02 PROCEDURE — 77030022704 HC SUT VLOC COVD -B: Performed by: COLON & RECTAL SURGERY

## 2022-06-02 PROCEDURE — P9045 ALBUMIN (HUMAN), 5%, 250 ML: HCPCS | Performed by: NURSE ANESTHETIST, CERTIFIED REGISTERED

## 2022-06-02 PROCEDURE — 77030010282 HC STPLR ENDOSC J&J -C: Performed by: COLON & RECTAL SURGERY

## 2022-06-02 PROCEDURE — 76060000043 HC ANESTHESIA 6 TO 6.5 HR: Performed by: COLON & RECTAL SURGERY

## 2022-06-02 PROCEDURE — 77030026438 HC STYL ET INTUB CARD -A: Performed by: ANESTHESIOLOGY

## 2022-06-02 PROCEDURE — 76210000016 HC OR PH I REC 1 TO 1.5 HR: Performed by: COLON & RECTAL SURGERY

## 2022-06-02 PROCEDURE — 77030008684 HC TU ET CUF COVD -B: Performed by: ANESTHESIOLOGY

## 2022-06-02 PROCEDURE — 77030012770 HC TRCR OPT FX AMR -B: Performed by: COLON & RECTAL SURGERY

## 2022-06-02 PROCEDURE — 77030008608 HC TRCR ENDOSC SMTH AMR -B: Performed by: COLON & RECTAL SURGERY

## 2022-06-02 PROCEDURE — 86900 BLOOD TYPING SEROLOGIC ABO: CPT

## 2022-06-02 PROCEDURE — 77030019927 HC TBNG IRR CYSTO BAXT -A: Performed by: COLON & RECTAL SURGERY

## 2022-06-02 PROCEDURE — 88305 TISSUE EXAM BY PATHOLOGIST: CPT

## 2022-06-02 PROCEDURE — 0DQV0ZZ REPAIR MESENTERY, OPEN APPROACH: ICD-10-PCS | Performed by: COLON & RECTAL SURGERY

## 2022-06-02 PROCEDURE — 83735 ASSAY OF MAGNESIUM: CPT

## 2022-06-02 PROCEDURE — 77030009527 HC GEL PRT SYS AMR -E: Performed by: COLON & RECTAL SURGERY

## 2022-06-02 PROCEDURE — 74011636637 HC RX REV CODE- 636/637

## 2022-06-02 DEVICE — BARRIER, ABSORBABLE, ADHESION
Type: IMPLANTABLE DEVICE | Status: FUNCTIONAL
Brand: SEPRAFILM®

## 2022-06-02 RX ORDER — LIDOCAINE HYDROCHLORIDE ANHYDROUS AND DEXTROSE MONOHYDRATE .8; 5 G/100ML; G/100ML
INJECTION, SOLUTION INTRAVENOUS
Status: DISCONTINUED | OUTPATIENT
Start: 2022-06-02 | End: 2022-06-02 | Stop reason: HOSPADM

## 2022-06-02 RX ORDER — ALBUMIN HUMAN 50 G/1000ML
SOLUTION INTRAVENOUS AS NEEDED
Status: DISCONTINUED | OUTPATIENT
Start: 2022-06-02 | End: 2022-06-02 | Stop reason: HOSPADM

## 2022-06-02 RX ORDER — DEXAMETHASONE SODIUM PHOSPHATE 4 MG/ML
INJECTION, SOLUTION INTRA-ARTICULAR; INTRALESIONAL; INTRAMUSCULAR; INTRAVENOUS; SOFT TISSUE AS NEEDED
Status: DISCONTINUED | OUTPATIENT
Start: 2022-06-02 | End: 2022-06-02 | Stop reason: HOSPADM

## 2022-06-02 RX ORDER — BUPIVACAINE HYDROCHLORIDE AND EPINEPHRINE 5; 5 MG/ML; UG/ML
30 INJECTION, SOLUTION EPIDURAL; INTRACAUDAL; PERINEURAL ONCE
Status: DISCONTINUED | OUTPATIENT
Start: 2022-06-02 | End: 2022-06-02 | Stop reason: HOSPADM

## 2022-06-02 RX ORDER — ROCURONIUM BROMIDE 10 MG/ML
INJECTION, SOLUTION INTRAVENOUS AS NEEDED
Status: DISCONTINUED | OUTPATIENT
Start: 2022-06-02 | End: 2022-06-02 | Stop reason: HOSPADM

## 2022-06-02 RX ORDER — LIDOCAINE HYDROCHLORIDE 20 MG/ML
INJECTION, SOLUTION EPIDURAL; INFILTRATION; INTRACAUDAL; PERINEURAL AS NEEDED
Status: DISCONTINUED | OUTPATIENT
Start: 2022-06-02 | End: 2022-06-02 | Stop reason: HOSPADM

## 2022-06-02 RX ORDER — METOPROLOL SUCCINATE 50 MG/1
100 TABLET, EXTENDED RELEASE ORAL DAILY
Status: DISCONTINUED | OUTPATIENT
Start: 2022-06-03 | End: 2022-06-08 | Stop reason: HOSPADM

## 2022-06-02 RX ORDER — SODIUM CHLORIDE 0.9 % (FLUSH) 0.9 %
5-40 SYRINGE (ML) INJECTION AS NEEDED
Status: DISCONTINUED | OUTPATIENT
Start: 2022-06-02 | End: 2022-06-02 | Stop reason: HOSPADM

## 2022-06-02 RX ORDER — PHENYLEPHRINE HCL IN 0.9% NACL 0.4MG/10ML
SYRINGE (ML) INTRAVENOUS AS NEEDED
Status: DISCONTINUED | OUTPATIENT
Start: 2022-06-02 | End: 2022-06-02 | Stop reason: HOSPADM

## 2022-06-02 RX ORDER — OXYCODONE HYDROCHLORIDE 5 MG/1
5 TABLET ORAL
Status: DISCONTINUED | OUTPATIENT
Start: 2022-06-02 | End: 2022-06-06

## 2022-06-02 RX ORDER — MIDAZOLAM HYDROCHLORIDE 1 MG/ML
1 INJECTION, SOLUTION INTRAMUSCULAR; INTRAVENOUS AS NEEDED
Status: DISCONTINUED | OUTPATIENT
Start: 2022-06-02 | End: 2022-06-02 | Stop reason: HOSPADM

## 2022-06-02 RX ORDER — MIDAZOLAM HYDROCHLORIDE 1 MG/ML
INJECTION, SOLUTION INTRAMUSCULAR; INTRAVENOUS AS NEEDED
Status: DISCONTINUED | OUTPATIENT
Start: 2022-06-02 | End: 2022-06-02 | Stop reason: HOSPADM

## 2022-06-02 RX ORDER — KETAMINE HYDROCHLORIDE 10 MG/ML
INJECTION, SOLUTION INTRAMUSCULAR; INTRAVENOUS AS NEEDED
Status: DISCONTINUED | OUTPATIENT
Start: 2022-06-02 | End: 2022-06-02 | Stop reason: HOSPADM

## 2022-06-02 RX ORDER — ONDANSETRON 2 MG/ML
INJECTION INTRAMUSCULAR; INTRAVENOUS AS NEEDED
Status: DISCONTINUED | OUTPATIENT
Start: 2022-06-02 | End: 2022-06-02 | Stop reason: HOSPADM

## 2022-06-02 RX ORDER — SODIUM CHLORIDE 0.9 % (FLUSH) 0.9 %
5-40 SYRINGE (ML) INJECTION EVERY 8 HOURS
Status: DISCONTINUED | OUTPATIENT
Start: 2022-06-02 | End: 2022-06-02 | Stop reason: HOSPADM

## 2022-06-02 RX ORDER — ENOXAPARIN SODIUM 100 MG/ML
40 INJECTION SUBCUTANEOUS DAILY
Status: DISCONTINUED | OUTPATIENT
Start: 2022-06-03 | End: 2022-06-08 | Stop reason: HOSPADM

## 2022-06-02 RX ORDER — SODIUM CHLORIDE, SODIUM LACTATE, POTASSIUM CHLORIDE, CALCIUM CHLORIDE 600; 310; 30; 20 MG/100ML; MG/100ML; MG/100ML; MG/100ML
75 INJECTION, SOLUTION INTRAVENOUS CONTINUOUS
Status: DISCONTINUED | OUTPATIENT
Start: 2022-06-02 | End: 2022-06-02 | Stop reason: HOSPADM

## 2022-06-02 RX ORDER — SUCCINYLCHOLINE CHLORIDE 20 MG/ML
INJECTION INTRAMUSCULAR; INTRAVENOUS AS NEEDED
Status: DISCONTINUED | OUTPATIENT
Start: 2022-06-02 | End: 2022-06-02 | Stop reason: HOSPADM

## 2022-06-02 RX ORDER — ONDANSETRON 4 MG/1
4 TABLET, ORALLY DISINTEGRATING ORAL
Status: DISCONTINUED | OUTPATIENT
Start: 2022-06-02 | End: 2022-06-08 | Stop reason: HOSPADM

## 2022-06-02 RX ORDER — AMLODIPINE BESYLATE 5 MG/1
10 TABLET ORAL
Status: DISCONTINUED | OUTPATIENT
Start: 2022-06-02 | End: 2022-06-08 | Stop reason: HOSPADM

## 2022-06-02 RX ORDER — CETIRIZINE HCL 10 MG
10 TABLET ORAL DAILY
Status: DISCONTINUED | OUTPATIENT
Start: 2022-06-03 | End: 2022-06-08 | Stop reason: HOSPADM

## 2022-06-02 RX ORDER — SODIUM CHLORIDE 0.9 % (FLUSH) 0.9 %
5-40 SYRINGE (ML) INJECTION AS NEEDED
Status: DISCONTINUED | OUTPATIENT
Start: 2022-06-02 | End: 2022-06-08 | Stop reason: HOSPADM

## 2022-06-02 RX ORDER — PANTOPRAZOLE SODIUM 40 MG/1
40 TABLET, DELAYED RELEASE ORAL
Status: DISCONTINUED | OUTPATIENT
Start: 2022-06-03 | End: 2022-06-08 | Stop reason: HOSPADM

## 2022-06-02 RX ORDER — BUPIVACAINE HYDROCHLORIDE AND EPINEPHRINE 2.5; 5 MG/ML; UG/ML
INJECTION, SOLUTION EPIDURAL; INFILTRATION; INTRACAUDAL; PERINEURAL AS NEEDED
Status: DISCONTINUED | OUTPATIENT
Start: 2022-06-02 | End: 2022-06-02 | Stop reason: HOSPADM

## 2022-06-02 RX ORDER — PROPOFOL 10 MG/ML
INJECTION, EMULSION INTRAVENOUS AS NEEDED
Status: DISCONTINUED | OUTPATIENT
Start: 2022-06-02 | End: 2022-06-02 | Stop reason: HOSPADM

## 2022-06-02 RX ORDER — NORETHINDRONE AND ETHINYL ESTRADIOL 0.5-0.035
KIT ORAL AS NEEDED
Status: DISCONTINUED | OUTPATIENT
Start: 2022-06-02 | End: 2022-06-02 | Stop reason: HOSPADM

## 2022-06-02 RX ORDER — ONDANSETRON 2 MG/ML
4 INJECTION INTRAMUSCULAR; INTRAVENOUS
Status: DISCONTINUED | OUTPATIENT
Start: 2022-06-02 | End: 2022-06-08 | Stop reason: HOSPADM

## 2022-06-02 RX ORDER — FENTANYL CITRATE 50 UG/ML
INJECTION, SOLUTION INTRAMUSCULAR; INTRAVENOUS AS NEEDED
Status: DISCONTINUED | OUTPATIENT
Start: 2022-06-02 | End: 2022-06-02 | Stop reason: HOSPADM

## 2022-06-02 RX ORDER — FENTANYL CITRATE 50 UG/ML
25 INJECTION, SOLUTION INTRAMUSCULAR; INTRAVENOUS
Status: COMPLETED | OUTPATIENT
Start: 2022-06-02 | End: 2022-06-02

## 2022-06-02 RX ORDER — SODIUM CHLORIDE, SODIUM LACTATE, POTASSIUM CHLORIDE, CALCIUM CHLORIDE 600; 310; 30; 20 MG/100ML; MG/100ML; MG/100ML; MG/100ML
75 INJECTION, SOLUTION INTRAVENOUS CONTINUOUS
Status: DISPENSED | OUTPATIENT
Start: 2022-06-02 | End: 2022-06-03

## 2022-06-02 RX ORDER — ACETAMINOPHEN 500 MG
1000 TABLET ORAL ONCE
Status: COMPLETED | OUTPATIENT
Start: 2022-06-02 | End: 2022-06-02

## 2022-06-02 RX ORDER — METRONIDAZOLE 500 MG/100ML
500 INJECTION, SOLUTION INTRAVENOUS ONCE
Status: COMPLETED | OUTPATIENT
Start: 2022-06-02 | End: 2022-06-02

## 2022-06-02 RX ORDER — INSULIN LISPRO 100 [IU]/ML
INJECTION, SOLUTION INTRAVENOUS; SUBCUTANEOUS
Status: COMPLETED
Start: 2022-06-02 | End: 2022-06-02

## 2022-06-02 RX ORDER — DEXMEDETOMIDINE HYDROCHLORIDE 100 UG/ML
INJECTION, SOLUTION INTRAVENOUS AS NEEDED
Status: DISCONTINUED | OUTPATIENT
Start: 2022-06-02 | End: 2022-06-02 | Stop reason: HOSPADM

## 2022-06-02 RX ORDER — LIDOCAINE HYDROCHLORIDE ANHYDROUS AND DEXTROSE MONOHYDRATE .8; 5 G/100ML; G/100ML
1 INJECTION, SOLUTION INTRAVENOUS CONTINUOUS
Status: DISPENSED | OUTPATIENT
Start: 2022-06-02 | End: 2022-06-04

## 2022-06-02 RX ORDER — SODIUM CHLORIDE 0.9 % (FLUSH) 0.9 %
5-40 SYRINGE (ML) INJECTION EVERY 8 HOURS
Status: DISCONTINUED | OUTPATIENT
Start: 2022-06-02 | End: 2022-06-08 | Stop reason: HOSPADM

## 2022-06-02 RX ORDER — ONDANSETRON 2 MG/ML
4 INJECTION INTRAMUSCULAR; INTRAVENOUS AS NEEDED
Status: DISCONTINUED | OUTPATIENT
Start: 2022-06-02 | End: 2022-06-02 | Stop reason: HOSPADM

## 2022-06-02 RX ORDER — GLYCOPYRROLATE 0.2 MG/ML
INJECTION INTRAMUSCULAR; INTRAVENOUS AS NEEDED
Status: DISCONTINUED | OUTPATIENT
Start: 2022-06-02 | End: 2022-06-02 | Stop reason: HOSPADM

## 2022-06-02 RX ORDER — ACETAMINOPHEN 500 MG
1000 TABLET ORAL EVERY 6 HOURS
Status: DISCONTINUED | OUTPATIENT
Start: 2022-06-02 | End: 2022-06-08 | Stop reason: HOSPADM

## 2022-06-02 RX ORDER — MAGNESIUM SULFATE 100 %
4 CRYSTALS MISCELLANEOUS AS NEEDED
Status: DISCONTINUED | OUTPATIENT
Start: 2022-06-02 | End: 2022-06-08 | Stop reason: HOSPADM

## 2022-06-02 RX ORDER — INSULIN LISPRO 100 [IU]/ML
INJECTION, SOLUTION INTRAVENOUS; SUBCUTANEOUS
Status: DISCONTINUED | OUTPATIENT
Start: 2022-06-02 | End: 2022-06-08 | Stop reason: HOSPADM

## 2022-06-02 RX ORDER — MAGNESIUM SULFATE HEPTAHYDRATE 40 MG/ML
INJECTION, SOLUTION INTRAVENOUS AS NEEDED
Status: DISCONTINUED | OUTPATIENT
Start: 2022-06-02 | End: 2022-06-02 | Stop reason: HOSPADM

## 2022-06-02 RX ORDER — CELECOXIB 100 MG/1
100 CAPSULE ORAL ONCE
Status: COMPLETED | OUTPATIENT
Start: 2022-06-02 | End: 2022-06-02

## 2022-06-02 RX ADMIN — Medication 80 MCG: at 13:06

## 2022-06-02 RX ADMIN — GLYCOPYRROLATE 0.2 MG: 0.2 INJECTION, SOLUTION INTRAMUSCULAR; INTRAVENOUS at 13:38

## 2022-06-02 RX ADMIN — SODIUM CHLORIDE, POTASSIUM CHLORIDE, SODIUM LACTATE AND CALCIUM CHLORIDE: 600; 310; 30; 20 INJECTION, SOLUTION INTRAVENOUS at 17:10

## 2022-06-02 RX ADMIN — METRONIDAZOLE 500 MG: 500 SOLUTION INTRAVENOUS at 13:00

## 2022-06-02 RX ADMIN — MAGNESIUM SULFATE 2 G: 2 INJECTION INTRAVENOUS at 13:00

## 2022-06-02 RX ADMIN — ONDANSETRON HYDROCHLORIDE 4 MG: 2 INJECTION, SOLUTION INTRAMUSCULAR; INTRAVENOUS at 13:00

## 2022-06-02 RX ADMIN — Medication 80 MCG: at 13:38

## 2022-06-02 RX ADMIN — PHENYLEPHRINE HYDROCHLORIDE 20 MCG/MIN: 10 INJECTION INTRAVENOUS at 15:15

## 2022-06-02 RX ADMIN — DEXMEDETOMIDINE HYDROCHLORIDE 10 MCG: 100 INJECTION, SOLUTION, CONCENTRATE INTRAVENOUS at 18:20

## 2022-06-02 RX ADMIN — Medication 5 UNITS: at 19:56

## 2022-06-02 RX ADMIN — SODIUM CHLORIDE, PRESERVATIVE FREE 10 ML: 5 INJECTION INTRAVENOUS at 22:06

## 2022-06-02 RX ADMIN — ROCURONIUM BROMIDE 10 MG: 10 SOLUTION INTRAVENOUS at 16:07

## 2022-06-02 RX ADMIN — Medication 20 MG: at 13:02

## 2022-06-02 RX ADMIN — ROCURONIUM BROMIDE 10 MG: 10 SOLUTION INTRAVENOUS at 16:45

## 2022-06-02 RX ADMIN — WATER 2 G: 1 INJECTION INTRAMUSCULAR; INTRAVENOUS; SUBCUTANEOUS at 17:25

## 2022-06-02 RX ADMIN — SODIUM CHLORIDE, POTASSIUM CHLORIDE, SODIUM LACTATE AND CALCIUM CHLORIDE: 600; 310; 30; 20 INJECTION, SOLUTION INTRAVENOUS at 14:28

## 2022-06-02 RX ADMIN — ROCURONIUM BROMIDE 10 MG: 10 SOLUTION INTRAVENOUS at 12:49

## 2022-06-02 RX ADMIN — ROCURONIUM BROMIDE 40 MG: 10 SOLUTION INTRAVENOUS at 13:00

## 2022-06-02 RX ADMIN — ROCURONIUM BROMIDE 20 MG: 10 SOLUTION INTRAVENOUS at 15:04

## 2022-06-02 RX ADMIN — DEXAMETHASONE SODIUM PHOSPHATE 4 MG: 4 INJECTION, SOLUTION INTRAMUSCULAR; INTRAVENOUS at 13:00

## 2022-06-02 RX ADMIN — FENTANYL CITRATE 50 MCG: 50 INJECTION, SOLUTION INTRAMUSCULAR; INTRAVENOUS at 18:15

## 2022-06-02 RX ADMIN — FENTANYL CITRATE 25 MCG: 50 INJECTION, SOLUTION INTRAMUSCULAR; INTRAVENOUS at 19:00

## 2022-06-02 RX ADMIN — ONDANSETRON HYDROCHLORIDE 4 MG: 2 SOLUTION INTRAMUSCULAR; INTRAVENOUS at 18:58

## 2022-06-02 RX ADMIN — DEXMEDETOMIDINE HYDROCHLORIDE 10 MCG: 100 INJECTION, SOLUTION, CONCENTRATE INTRAVENOUS at 18:10

## 2022-06-02 RX ADMIN — GLYCOPYRROLATE 0.2 MG: 0.2 INJECTION, SOLUTION INTRAMUSCULAR; INTRAVENOUS at 13:35

## 2022-06-02 RX ADMIN — FENTANYL CITRATE 50 MCG: 50 INJECTION, SOLUTION INTRAMUSCULAR; INTRAVENOUS at 12:49

## 2022-06-02 RX ADMIN — ACETAMINOPHEN 1000 MG: 500 TABLET ORAL at 22:04

## 2022-06-02 RX ADMIN — SODIUM CHLORIDE, POTASSIUM CHLORIDE, SODIUM LACTATE AND CALCIUM CHLORIDE 75 ML/HR: 600; 310; 30; 20 INJECTION, SOLUTION INTRAVENOUS at 11:57

## 2022-06-02 RX ADMIN — Medication 120 MCG: at 15:12

## 2022-06-02 RX ADMIN — AMLODIPINE BESYLATE 10 MG: 5 TABLET ORAL at 22:04

## 2022-06-02 RX ADMIN — ROCURONIUM BROMIDE 20 MG: 10 SOLUTION INTRAVENOUS at 13:44

## 2022-06-02 RX ADMIN — SUGAMMADEX 200 MG: 100 INJECTION, SOLUTION INTRAVENOUS at 18:16

## 2022-06-02 RX ADMIN — EPHEDRINE SULFATE 10 MG: 50 INJECTION INTRAVENOUS at 16:30

## 2022-06-02 RX ADMIN — LIDOCAINE HYDROCHLORIDE 2 MG/KG/HR: 8 INJECTION, SOLUTION INTRAVENOUS at 13:00

## 2022-06-02 RX ADMIN — NALOXEGOL OXALATE 25 MG: 25 TABLET, FILM COATED ORAL at 11:37

## 2022-06-02 RX ADMIN — ALBUMIN (HUMAN) 250 ML: 12.5 INJECTION, SOLUTION INTRAVENOUS at 13:00

## 2022-06-02 RX ADMIN — PROPOFOL 150 MG: 10 INJECTION, EMULSION INTRAVENOUS at 12:49

## 2022-06-02 RX ADMIN — ACETAMINOPHEN 1000 MG: 500 TABLET ORAL at 11:37

## 2022-06-02 RX ADMIN — Medication 30 MG: at 12:49

## 2022-06-02 RX ADMIN — SUCCINYLCHOLINE CHLORIDE 140 MG: 20 INJECTION, SOLUTION INTRAMUSCULAR; INTRAVENOUS at 12:50

## 2022-06-02 RX ADMIN — OXYCODONE 5 MG: 5 TABLET ORAL at 22:12

## 2022-06-02 RX ADMIN — LIDOCAINE HYDROCHLORIDE 60 MG: 20 INJECTION, SOLUTION EPIDURAL; INFILTRATION; INTRACAUDAL; PERINEURAL at 12:49

## 2022-06-02 RX ADMIN — MIDAZOLAM 2 MG: 1 INJECTION INTRAMUSCULAR; INTRAVENOUS at 12:32

## 2022-06-02 RX ADMIN — WATER 2 G: 1 INJECTION INTRAMUSCULAR; INTRAVENOUS; SUBCUTANEOUS at 13:25

## 2022-06-02 RX ADMIN — Medication 80 MCG: at 15:15

## 2022-06-02 RX ADMIN — CELECOXIB 100 MG: 100 CAPSULE ORAL at 11:37

## 2022-06-02 NOTE — ANESTHESIA PREPROCEDURE EVALUATION
Relevant Problems   No relevant active problems       Anesthetic History   No history of anesthetic complications            Review of Systems / Medical History  Patient summary reviewed, nursing notes reviewed and pertinent labs reviewed    Pulmonary  Within defined limits                 Neuro/Psych   Within defined limits           Cardiovascular    Hypertension        Dysrhythmias : atrial fibrillation           GI/Hepatic/Renal     GERD           Endo/Other        Arthritis     Other Findings              Physical Exam    Airway  Mallampati: II  TM Distance: > 6 cm  Neck ROM: normal range of motion   Mouth opening: Normal     Cardiovascular  Regular rate and rhythm,  S1 and S2 normal,  no murmur, click, rub, or gallop             Dental  No notable dental hx       Pulmonary  Breath sounds clear to auscultation               Abdominal  GI exam deferred       Other Findings            Anesthetic Plan    ASA: 3  Anesthesia type: general          Induction: Intravenous  Anesthetic plan and risks discussed with: Patient

## 2022-06-02 NOTE — PERIOP NOTES
1950 Abnormal labs called to Dr. Rodney Frank. Order to cover glucose with sliding scale insulin now received. 1955 VS stable. Awake and alert. Resting comfortably. Patient denies nausea. Pain improved. No signs of excessive bleeding. Tolerating ice chips without difficulty. Ready to transfer from PACU.

## 2022-06-02 NOTE — OP NOTES
Operative Report    Patient: Shaniqua Rapp MRN: [id number]  SSN: [id number]    YOB: 1950  Age: 80 y.o. Sex: male       Date of Surgery: 6/2/2022     Preoperative Diagnosis: COLON POLYP , need for intraoperative ureteral identification    Postoperative Diagnosis: * same    Surgeon(s) and Role:  Panel 2:     * Vasqeuz Lovell MD - Primary    Anesthesia: General     Procedures Performed: cystourethroscopy, bilateral externalized ureteral stent placement, placement of cuellar catheter      Findings:  Urethra normal. Sphincter intact and closed. Prostate surgically absent. Bladder mucosa normal with no lesions or masses. UOs orthotopic. Procedure in Detail:   The patient was brought to the operating room. A time out was performed by the team to confirm correct patient, site, and procedure. General anesthesia was induced. The patient was then moved into lithotomy position. The patient was prepped and draped in the usual sterile fashion. Cystourethroscopy was performed with findings as above. The right ureteral orifice was cannulated with a sensor wire. This was easily advanced until resistance was felt in the renal pelvis. A 5fr ureteral catheter was advanced over this and the wire was removed. An identical procedure was performed on the left side. The scope was removed. A 16fr cuellar catheter was placed and 10cc added to the balloon. The ureteral catheters and cuellar were secured with the adapter to a drainage bag. Care of the patient was then turned over to the primary surgeon.        Estimated Blood Loss:  none    Implants: none           Specimens: * No specimens in log *        Drains: bilateral externalized ureteral stents, 16fr cuellar catheter                Complications: None    Disposition: the ureteral catheters and cuellar will be removed at the discretion of the primary team      Signed By:  Eduardo Denny MD     June 2, 2022

## 2022-06-02 NOTE — BRIEF OP NOTE
Brief Postoperative Note    Patient: Mars Andrews  YOB: 1950  MRN: [id number]    Date of Procedure: 6/2/2022     Pre-Op Diagnosis:  Colonic polyp. Post-Op Diagnosis:  Colonic polyp. Umbilical hernia. Procedure:  Hand-assisted laparoscopic right hemicolectomy, ileocolostomy, and primary repair of umbilical hernia. Surgeon:  Cheri King MD  Surgical Assistants:  Jarvis Muhammad SA; Torrey Davey  Anesthesia: General endotracheal  Specimens:   ID Type Source Tests Collected by Time Destination   1 : RIGHT COLON AND TERMINAL ILEUM Fresh Colon, Ascending  Kirk Perdue MD 6/2/2022 1515 Pathology   2 : OMENTUM Fresh Omentum  Kirk Perdue MD 6/2/2022 1536 Pathology   Drains:  None  Estimated Blood Loss (mL):  200 mL  Crystalloid:  2300 mL  Albumin: 250 mL  Urine:  20 mL collected, but an unmeasured quantity was found to have leaked from the tubing connector. 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 No evidence of malignancy. Ectopic hepatic tissue on the gallbladder.       Electronically Signed by Cheri King MD

## 2022-06-02 NOTE — H&P
Colorectal Surgery Pre-Operative History and Physical Examination Note          NAME:  Dave Mccauley   :   1950   MRN:   [id number]     Operation Date: 2022    Chief Complaint:  Colonic polyp     History of Present Illness: The patient is an asymptomatic 64-year-old male who underwent a colonoscopy on 10/6/2021. That procedure was remarkable for sigmoid diverticulosis, two sessile ascending colon polyps, and three sessile cecal polyps. One of the cecal polyps was approximately 2.5 cm in greatest dimension, very flat, and located on the ileocecal valve. All of the polyps removed, but the ileocecal valve polyp required a piecemeal technique. The pathology report indicated that all of the tissue fragments were consistent with tubular adenoma. A planned follow-up colonoscopy was then performed on 2022, and during that procedure a residual large flat polyp was again noted on the ileocecal valve. An attempt to lift it to permit complete excision was not successful. Biopsies were obtained, and the specimens reportedly demonstrated normal colonic mucosa. Nevertheless, Dr. Hossein hCavarria remains highly suspicious that there is residual neoplastic tissue that cannot be removed with the colonoscope. PMH:  Past Medical History:   Diagnosis Date    Arrhythmia 2006    ATRIAL FIBRILLATION - when thyroid \"went out\" - no problem since thyroid is under control    Arthritis     Bilateral groin pain 2016    COVID-19 vaccine series completed     Pfizer dose #1 received on 2021; dose #2 received on 2021    GERD (gastroesophageal reflux disease)     Hiatal hernia     History of prostate cancer     Hypertension     Ill-defined condition     KIDNEY STONE    Joint pain     Multiple stiff joints     Muscle ache     Obesity (BMI 30.0-34. 9)     Sinus problem     Thyroid disease     radiation tablet       PSH:  Past Surgical History:   Procedure Laterality Date    COLONOSCOPY N/A 10/4/2016    Dulce Motley Paolo Vargas MD    COLONOSCOPY N/A 10/6/2021    Syliva Goltz, MD    COLONOSCOPY N/A 4/6/2022    Syliva Goltz, MD    HX APPENDECTOMY  age 11    HX CATARACT REMOVAL      BILATERAL CATARACT REMOVAL    HX GI      ESOPHAGEAL DILITATION (SEVERAL)    HX HERNIA REPAIR Left age 10    Left inguinal hernia repair.  HX ORTHOPAEDIC      left wrist (bone taken out of thumb and a tendon transplanted) and left shoulder - bone spur removed, right knee - meniscus removed and right shoulder x 2- bone spurs x2 and removed part of collar bone    HX ORTHOPAEDIC      GILL THUMB    HX ORTHOPAEDIC      RIGHT WRIST    HX UROLOGICAL  age 9    Circumcision       Home Medications:  Cannot display prior to admission medications because the patient has not been admitted in this contact. Hospital Medications:  No current facility-administered medications for this encounter. Current Outpatient Medications   Medication Sig    ergocalciferol (Vitamin D2) 1,250 mcg (50,000 unit) capsule Take 50,000 Units by mouth every seven (7) days. ON TUESDAY    nebivoloL (Bystolic) 10 mg tablet Take 10 mg by mouth daily.  amLODIPine (NORVASC) 10 mg tablet Take 10 mg by mouth nightly.  SYNTHROID 175 mcg tablet Take 175 mcg by mouth daily. Takes at 2am.    pantoprazole (PROTONIX) 40 mg tablet Take 40 mg by mouth daily.  CETIRIZINE HCL (ZYRTEC PO) Take 10 mg by mouth daily as needed.  ASPIRIN (ASPIR-81 PO) Take 81 mg by mouth daily. Allergies:   Allergies   Allergen Reactions    Codeine Nausea and Vomiting    Sulfa (Sulfonamide Antibiotics) Other (comments)     \"MAKES ME FEEL WEIRD\"       Family History:  Family History   Problem Relation Age of Onset    Cancer Mother         breast    Heart Disease Mother     Heart Failure Mother     Pacemaker Mother     Cancer Father         prostate x 4    Diabetes Brother     Cancer Brother         prostate    Heart Disease Brother     Heart Attack Brother     Diabetes Brother     Cancer Daughter         cervical cancer    Diabetes Sister     No Known Problems Sister     Anesth Problems Neg Hx        Social History:  Social History     Tobacco Use    Smoking status: Never Smoker    Smokeless tobacco: Never Used   Substance Use Topics    Alcohol use: No     Alcohol/week: 0.0 standard drinks       Review of Systems:    Symptom Y/N Comments  Symptom Y/N Comments   Fever/Chills N   Chest Pain N    Cough N   Abdominal Pain N    Sputum N   Joint Pain     SOB/CHRIS N   Pruritis/Rash     Nausea/vomit N   Tolerating PT/OT     Diarrhea N   Tolerating Diet Y    Constipation N   Other       Could NOT obtain due to:        Objective:   No data found. No intake/output data recorded. No intake/output data recorded. PHYSICAL EXAMINATION:    GENERAL:  No distress. HEENT:  Anicteric. LUNGS:  Clear to auscultation bilaterally. HEART:  Regular rate and rhythm with no murmurs, gallops, or rubs. ABDOMEN:  Soft. Non-tender. Non-distended. No palpable masses. EXTREMITIES:  No edema. NEURO:  Alert and oriented. Data Review      5/18/2022 08:16   WBC 9.6   HGB 16.6   HCT 47.6   PLATELET 100   Sodium 140   Potassium 3.7   Chloride 108   CO2 24   Glucose 206 (H)   BUN 33 (H)   Creatinine 1.14   BUN/Creatinine ratio 29 (H)   Calcium 9.2   GFR est non-AA >60   Bilirubin, total 0.9   Protein, total 6.9   Albumin 3.8   ALT 26   AST 16   Alk. phosphatase 74   Hemoglobin A1c, (calculated) 7.5 (H)   Est. average glucose 169   CEA 2.0                       Assessment and Plan:      The rationale for a colon resection following oncologic principles was discussed with the patient in great detail. He understands that there is a significant chance that there is no residual neoplastic disease but that if there is then the resection would be the best treatment.     The risks of surgery have been discussed in detail including, but not limited to bleeding (possibly requiring blood transfusion or return to the operating room), infection (wound, urinary tract, intraabdominal or pelvic, pulmonary), anastomotic leakage (possibly requiring the creation of a temporary ostomy), anastomotic stricture, injury to other organs or structures including ureters, urinary bladder, small intestine, small bowel obstruction, hernia formation, functional problems, myocardial infarction, stroke, deep venous thrombosis, pulmonary embolism, and death. It has been explained that although a hand-assisted laparoscopic operation is planned it could become necessary to convert to the open operation. It has also been explained that a urologist will be consulted to perform the cystoscopic placement of one or more temporary ureteral catheters in order to facilitate intraoperative identification of the ureters. He appears to have understood all of the above, and he has agreed to proceed.     Principal Problem:    Colonic polyp (6/2/2022)

## 2022-06-02 NOTE — WOUND CARE
Stoma Marking Note:     Type of ostomy scheduled: right colectomy by Dr. Sumeet Hamilton    Introduced self and services to patient. Patient able to verbalize knowledge of procedure consistent with consent. Stoma site marked with surgical marker in RLQ  Stoma site chosen is in the patients visual field and within the rectus muscle while avoiding the following: umbilicus, wrinkles, dips, skin folds, rib cage, iliac crest and belt/pants/underwear line. Site was assessed in the lying, sitting and standing positions. Abdominal topography rounded  Pt had no concerns, hoping no ostomy needs to be created. Patient understands that the final location will be determined by the surgeon and the site is only a guideline. Plan to follow after surgery for teaching, wife, Jie Barron, will be present for ostomy teaching. Will likely need home health care for further teaching after discharge.     Vidhya Alvarez RN, BSN, [facility]  Certified Wound Care Nurse  office: [phone]  pager [phone] or Perfect Serve

## 2022-06-03 LAB
ANION GAP SERPL CALC-SCNC: 5 MMOL/L (ref 5–15)
BUN SERPL-MCNC: 17 MG/DL (ref 6–20)
BUN/CREAT SERPL: 13 (ref 12–20)
CALCIUM SERPL-MCNC: 8.3 MG/DL (ref 8.5–10.1)
CHLORIDE SERPL-SCNC: 108 MMOL/L (ref 97–108)
CO2 SERPL-SCNC: 24 MMOL/L (ref 21–32)
CREAT SERPL-MCNC: 1.33 MG/DL (ref 0.7–1.3)
ERYTHROCYTE [DISTWIDTH] IN BLOOD BY AUTOMATED COUNT: 13.7 % (ref 11.5–14.5)
GLUCOSE BLD STRIP.AUTO-MCNC: 199 MG/DL (ref 65–117)
GLUCOSE BLD STRIP.AUTO-MCNC: 205 MG/DL (ref 65–117)
GLUCOSE BLD STRIP.AUTO-MCNC: 217 MG/DL (ref 65–117)
GLUCOSE BLD STRIP.AUTO-MCNC: 248 MG/DL (ref 65–117)
GLUCOSE SERPL-MCNC: 222 MG/DL (ref 65–100)
HCT VFR BLD AUTO: 41.7 % (ref 36.6–50.3)
HGB BLD-MCNC: 14.4 G/DL (ref 12.1–17)
MAGNESIUM SERPL-MCNC: 2.4 MG/DL (ref 1.6–2.4)
MCH RBC QN AUTO: 29.9 PG (ref 26–34)
MCHC RBC AUTO-ENTMCNC: 34.5 G/DL (ref 30–36.5)
MCV RBC AUTO: 86.5 FL (ref 80–99)
NRBC # BLD: 0 K/UL (ref 0–0.01)
NRBC BLD-RTO: 0 PER 100 WBC
PHOSPHATE SERPL-MCNC: 2.3 MG/DL (ref 2.6–4.7)
PLATELET # BLD AUTO: 118 K/UL (ref 150–400)
PMV BLD AUTO: 10 FL (ref 8.9–12.9)
POTASSIUM SERPL-SCNC: 5.1 MMOL/L (ref 3.5–5.1)
RBC # BLD AUTO: 4.82 M/UL (ref 4.1–5.7)
SERVICE CMNT-IMP: ABNORMAL
SODIUM SERPL-SCNC: 137 MMOL/L (ref 136–145)
WBC # BLD AUTO: 12.1 K/UL (ref 4.1–11.1)

## 2022-06-03 PROCEDURE — 85027 COMPLETE CBC AUTOMATED: CPT

## 2022-06-03 PROCEDURE — 82962 GLUCOSE BLOOD TEST: CPT

## 2022-06-03 PROCEDURE — 65270000032 HC RM SEMIPRIVATE

## 2022-06-03 PROCEDURE — 84100 ASSAY OF PHOSPHORUS: CPT

## 2022-06-03 PROCEDURE — 74011000250 HC RX REV CODE- 250: Performed by: COLON & RECTAL SURGERY

## 2022-06-03 PROCEDURE — 36415 COLL VENOUS BLD VENIPUNCTURE: CPT

## 2022-06-03 PROCEDURE — 74011636637 HC RX REV CODE- 636/637: Performed by: COLON & RECTAL SURGERY

## 2022-06-03 PROCEDURE — 97161 PT EVAL LOW COMPLEX 20 MIN: CPT

## 2022-06-03 PROCEDURE — 51798 US URINE CAPACITY MEASURE: CPT

## 2022-06-03 PROCEDURE — 80048 BASIC METABOLIC PNL TOTAL CA: CPT

## 2022-06-03 PROCEDURE — 74011250636 HC RX REV CODE- 250/636: Performed by: COLON & RECTAL SURGERY

## 2022-06-03 PROCEDURE — 74011250637 HC RX REV CODE- 250/637: Performed by: COLON & RECTAL SURGERY

## 2022-06-03 PROCEDURE — 83735 ASSAY OF MAGNESIUM: CPT

## 2022-06-03 PROCEDURE — 97530 THERAPEUTIC ACTIVITIES: CPT

## 2022-06-03 RX ADMIN — Medication 3 UNITS: at 07:30

## 2022-06-03 RX ADMIN — AMLODIPINE BESYLATE 10 MG: 5 TABLET ORAL at 21:52

## 2022-06-03 RX ADMIN — SODIUM CHLORIDE, POTASSIUM CHLORIDE, SODIUM LACTATE AND CALCIUM CHLORIDE 75 ML/HR: 600; 310; 30; 20 INJECTION, SOLUTION INTRAVENOUS at 02:18

## 2022-06-03 RX ADMIN — SODIUM CHLORIDE, PRESERVATIVE FREE 10 ML: 5 INJECTION INTRAVENOUS at 05:59

## 2022-06-03 RX ADMIN — ACETAMINOPHEN 1000 MG: 500 TABLET ORAL at 05:36

## 2022-06-03 RX ADMIN — SODIUM CHLORIDE, PRESERVATIVE FREE 10 ML: 5 INJECTION INTRAVENOUS at 14:00

## 2022-06-03 RX ADMIN — LEVOTHYROXINE SODIUM 175 MCG: 0.15 TABLET ORAL at 09:30

## 2022-06-03 RX ADMIN — PANTOPRAZOLE SODIUM 40 MG: 40 TABLET, DELAYED RELEASE ORAL at 08:08

## 2022-06-03 RX ADMIN — ACETAMINOPHEN 1000 MG: 500 TABLET ORAL at 12:32

## 2022-06-03 RX ADMIN — METOPROLOL SUCCINATE 100 MG: 50 TABLET, EXTENDED RELEASE ORAL at 09:31

## 2022-06-03 RX ADMIN — OXYCODONE 5 MG: 5 TABLET ORAL at 05:52

## 2022-06-03 RX ADMIN — NALOXEGOL OXALATE 25 MG: 12.5 TABLET, FILM COATED ORAL at 09:29

## 2022-06-03 RX ADMIN — ONDANSETRON HYDROCHLORIDE 4 MG: 2 SOLUTION INTRAMUSCULAR; INTRAVENOUS at 02:08

## 2022-06-03 RX ADMIN — LIDOCAINE HYDROCHLORIDE ANHYDROUS AND DEXTROSE MONOHYDRATE 1 MG/KG/HR: .8; 5 INJECTION, SOLUTION INTRAVENOUS at 08:08

## 2022-06-03 RX ADMIN — ACETAMINOPHEN 1000 MG: 500 TABLET ORAL at 17:01

## 2022-06-03 RX ADMIN — Medication 3 UNITS: at 16:59

## 2022-06-03 RX ADMIN — Medication 3 UNITS: at 12:32

## 2022-06-03 RX ADMIN — OXYCODONE 5 MG: 5 TABLET ORAL at 21:52

## 2022-06-03 RX ADMIN — CETIRIZINE HYDROCHLORIDE 10 MG: 10 TABLET, FILM COATED ORAL at 09:31

## 2022-06-03 NOTE — PROGRESS NOTES
Problem: Falls - Risk of  Goal: *Absence of Falls  Description: Document Adelfo Norman Fall Risk and appropriate interventions in the flowsheet.   Outcome: Progressing Towards Goal  Note: Fall Risk Interventions:  Mobility Interventions: Patient to call before getting OOB         Medication Interventions: Patient to call before getting OOB

## 2022-06-03 NOTE — PROGRESS NOTES
PHYSICAL THERAPY EVALUATION/DISCHARGE  Patient: Keyanna Freitas (55 y.o. male)  Date: 6/3/2022  Primary Diagnosis: Colonic polyp [K63.5]  Procedure(s) (LRB):  HAND ASSISTED LAPROSCOPIC RIGHT HEMICHOLECTOMY (E R A S), (Right)  CYSTOSCOPY WITH INSERTION OF BILATERAL URETERAL STENTS (Bilateral) 1 Day Post-Op   Precautions: falls       ASSESSMENT  Based on the objective data described below, the patient presents with R hemicolectomy, ilieocolostomy, and repair of umbilical hernia, POD #1. Pt noted to be ambulating well in the hallways without assistance, however demos decreased understanding of bed mobility and positioning. Pt educated on proper propping, placement of pillows for coughing, stretching out to decrease pain when coming into standing, and log rolling. Answered questions as appropriate. Will DC as patient does not require further PT after education. Functional Outcome Measure: The patient scored 95/100 on the barthel outcome measure which is indicative of low complexity. Other factors to consider for discharge:      Further skilled acute physical therapy is not indicated at this time. PLAN :  Recommendation for discharge: (in order for the patient to meet his/her long term goals)  No skilled physical therapy/ follow up rehabilitation needs identified at this time. This discharge recommendation:  Has been made in collaboration with the attending provider and/or case management    IF patient discharges home will need the following DME: none       SUBJECTIVE:   Patient stated It pulls when I move.     OBJECTIVE DATA SUMMARY:   HISTORY:    Past Medical History:   Diagnosis Date    Arrhythmia 2006    ATRIAL FIBRILLATION - when thyroid \"went out\" - no problem since thyroid is under control    Arthritis     Bilateral groin pain 4/19/2016    COVID-19 vaccine series completed     Pfizer dose #1 received on 4/8/2021; dose #2 received on 4/30/2021    GERD (gastroesophageal reflux disease)     Hiatal hernia     History of prostate cancer     Hypertension     Ill-defined condition     KIDNEY STONE    Joint pain     Multiple stiff joints     Muscle ache     Obesity (BMI 30.0-34. 9)     Sinus problem     Thyroid disease     radiation tablet     Past Surgical History:   Procedure Laterality Date    COLONOSCOPY N/A 10/4/2016    Jessica Child MD    COLONOSCOPY N/A 10/6/2021    Lawrence Carey MD    COLONOSCOPY N/A 4/6/2022    Lawrence Carey MD    HX APPENDECTOMY  age 11   Emely Martin HX CATARACT REMOVAL      BILATERAL CATARACT REMOVAL    HX GI      ESOPHAGEAL DILITATION (SEVERAL)    HX HERNIA REPAIR Left age 10    Left inguinal hernia repair.  HX ORTHOPAEDIC      left wrist (bone taken out of thumb and a tendon transplanted) and left shoulder - bone spur removed, right knee - meniscus removed and right shoulder x 2- bone spurs x2 and removed part of collar bone    HX ORTHOPAEDIC      GILL THUMB    HX ORTHOPAEDIC      RIGHT WRIST    HX UROLOGICAL  age 9    Circumcision       Prior level of function:   Personal factors and/or comorbidities impacting plan of care:   Home Situation  Support Systems: Spouse/Significant Other  Patient Expects to be Discharged to[de-identified] Home with family assistance    EXAMINATION/PRESENTATION/DECISION MAKING:   Critical Behavior:              Hearing:     Skin:  Intact   Edema: none  Range Of Motion:  AROM: Within functional limits           PROM: Within functional limits           Strength:    Strength:  Within functional limits                    Tone & Sensation:                                  Coordination:  Coordination: Within functional limits  Vision:      Functional Mobility:  Bed Mobility:  Rolling: Supervision (cues required for all bed mob)  Supine to Sit: Supervision  Sit to Supine: Supervision  Scooting: Supervision  Transfers:  Sit to Stand: Independent  Stand to Sit: Independent  Stand Pivot Transfers: Independent                    Balance:   Sitting: Intact  Standing: Intact  Ambulation/Gait Training:  Gait Description (WDL): Within defined limits (has been ambulating independently)          Functional Measure:  Barthel Index:    Bathin  Bladder: 10  Bowels: 10  Groomin  Dressing: 10  Feeding: 10  Mobility: 15  Stairs: 5  Toilet Use: 10  Transfer (Bed to Chair and Back): 15  Total: 95/100       The Barthel ADL Index: Guidelines  1. The index should be used as a record of what a patient does, not as a record of what a patient could do. 2. The main aim is to establish degree of independence from any help, physical or verbal, however minor and for whatever reason. 3. The need for supervision renders the patient not independent. 4. A patient's performance should be established using the best available evidence. Asking the patient, friends/relatives and nurses are the usual sources, but direct observation and common sense are also important. However direct testing is not needed. 5. Usually the patient's performance over the preceding 24-48 hours is important, but occasionally longer periods will be relevant. 6. Middle categories imply that the patient supplies over 50 per cent of the effort. 7. Use of aids to be independent is allowed. Score Interpretation (from [address] [address])    Independent   60-79 Minimally independent   40-59 Partially dependent   20-39 Very dependent   <20 Totally dependent     -Brenda Box., Barthel, D.W. (1965). Functional evaluation: the Barthel Index. [address] [facility] ([address] (1997). The Barthel activities of daily living index: self-reporting versus actual performance in the old (> or = 75 years). Journal of [address] [place] [address] 45(7), [facility], .JJESUF, Mihaela Rico., Roney Simon. (1999). Measuring the change in disability after inpatient rehabilitation; comparison of the responsiveness of the Barthel Index and Functional [place] Measure.  Journal of Neurology, Neurosurgery, and Psychiatry, 664), 220-174. Tino Hamman, N.J.A, CARLITOS Buckley, & Bernardo Early M.A. (2004) Assessment of post-stroke quality of life in cost-effectiveness studies: The usefulness of the Barthel Index and the EuroQoL-5D. Quality of Life Research, 15, 107-26            Physical Therapy Evaluation Charge Determination   History Examination Presentation Decision-Making   HIGH Complexity :3+ comorbidities / personal factors will impact the outcome/ POC  LOW Complexity : 1-2 Standardized tests and measures addressing body structure, function, activity limitation and / or participation in recreation  LOW Complexity : Stable, uncomplicated  Other outcome measures barthel  LOW       Based on the above components, the patient evaluation is determined to be of the following complexity level: LOW     Pain Rating:  Complains of pain with movement, stiffness, better after stretching    Activity Tolerance:   Fair      After treatment patient left in no apparent distress:   Patient positioned in L sidelying for pressure relief, Call bell within reach and Caregiver / family present    COMMUNICATION/EDUCATION:   The patients plan of care was discussed with: Registered nurse and Case management. Patient/family have participated as able in goal setting and plan of care.     Thank you for this referral.  Howard Poon, PT   Time Calculation: 27 mins

## 2022-06-03 NOTE — CONSULTS
Nutrition Education    · Educated on Low Fiber Diet. · Learners: Patient and Significant Other  · Readiness: Acceptance  · Method: Explanation and Handout  · Response: Verbalizes Understanding  · Contact name and number provided.     Lulú Manrique RD  Contact via [employer]

## 2022-06-03 NOTE — PROGRESS NOTES
General Daily Progress Note    Admission Date: 6/2/2022  Hospital Day 2  Post-Op Day 1  Subjective:   No nausea. No dyspnea. No calf pain. Has ambulated. Objective:   Patient Vitals for the past 24 hrs:   BP Temp Pulse Resp SpO2 Height Weight   06/03/22 0814 (!) 135/55 97.6 °F (36.4 °C) 70 18 94 % -- --   06/02/22 2306 134/66 97.5 °F (36.4 °C) 68 17 93 % -- --   06/02/22 2204 127/62 97.5 °F (36.4 °C) 67 17 93 % -- --   06/02/22 2047 129/63 97.8 °F (36.6 °C) 67 16 92 % -- --   06/02/22 2015 (!) 103/52 -- 66 11 100 % -- --   06/02/22 2000 (!) 113/55 -- 67 16 99 % -- --   06/02/22 1945 (!) 112/52 97.9 °F (36.6 °C) 68 20 99 % -- --   06/02/22 1930 (!) 112/53 -- 69 13 97 % -- --   06/02/22 1915 (!) 111/52 -- 70 11 98 % -- --   06/02/22 1900 (!) 117/51 -- 69 26 95 % -- --   06/02/22 1855 (!) 117/55 -- 71 17 100 % -- --   06/02/22 1850 (!) 117/55 99.6 °F (37.6 °C) 69 18 96 % -- --   06/02/22 1845 (!) 118/57 -- 70 16 97 % -- --   06/02/22 1840 (!) 123/57 -- 77 27 97 % -- --   06/02/22 1836 127/70 99.6 °F (37.6 °C) 72 10 100 % -- --   06/02/22 1835 127/70 -- -- -- 99 % -- --   06/02/22 1129 (!) 147/75 98.7 °F (37.1 °C) (!) 59 18 96 % 5' 10\" (1.778 m) 97.4 kg (214 lb 11.2 oz)     No intake/output data recorded. 06/01 1901 - 06/03 0700  In: 3078 [P.O.:240; I.V.:2838]  Out: 1020 [Urine:820]      Physical Examination:  General Appearance - No distress  Chest - Lungs clear to auscultation. Heart - Normal rate and regular rhythm. Abdomen - Non-distended. Appropriately tender. Incisions clean, dry, intact, and with ecchymosis but no erythema.  - Dillard catheter draining bloody urine. Extremities - Pneumatic compression stockings in use. No calf tenderness.             Data Review   Recent Results (from the past 24 hour(s))   TYPE & SCREEN    Collection Time: 06/02/22 11:58 AM   Result Value Ref Range    Crossmatch Expiration 06/05/2022,2359     ABO/Rh(D) A POSITIVE     Antibody screen NEG    POST EXPOSURE PROFILE Collection Time: 06/02/22  5:45 PM   Result Value Ref Range    Hepatitis B surface Ag <0.10 Index    Hep B surface Ag Interp. Negative NEG      Hep C virus Ab Interp. NONREACTIVE NR      p24 Antigen NONREACTIVE NR      HIV-1,2 Ab NONREACTIVE NR     CBC W/O DIFF    Collection Time: 06/02/22  6:46 PM   Result Value Ref Range    WBC 13.5 (H) 4.1 - 11.1 K/uL    RBC 4.90 4. 10 - 5.70 M/uL    HGB 14.7 12.1 - 17.0 g/dL    HCT 41.5 36.6 - 50.3 %    MCV 84.7 80.0 - 99.0 FL    MCH 30.0 26.0 - 34.0 PG    MCHC 35.4 30.0 - 36.5 g/dL    RDW 13.8 11.5 - 14.5 %    PLATELET 235 (L) 961 - 400 K/uL    MPV 9.8 8.9 - 12.9 FL    NRBC 0.0 0  WBC    ABSOLUTE NRBC 0.00 0.00 - 4.29 K/uL   METABOLIC PANEL, BASIC    Collection Time: 06/02/22  6:46 PM   Result Value Ref Range    Sodium 136 136 - 145 mmol/L    Potassium 4.2 3.5 - 5.1 mmol/L    Chloride 107 97 - 108 mmol/L    CO2 20 (L) 21 - 32 mmol/L    Anion gap 9 5 - 15 mmol/L    Glucose 263 (H) 65 - 100 mg/dL    BUN 15 6 - 20 MG/DL    Creatinine 1.77 (H) 0.70 - 1.30 MG/DL    BUN/Creatinine ratio 8 (L) 12 - 20      GFR est AA 46 (L) >60 ml/min/1.73m2    GFR est non-AA 38 (L) >60 ml/min/1.73m2    Calcium 8.0 (L) 8.5 - 10.1 MG/DL   MAGNESIUM    Collection Time: 06/02/22  6:46 PM   Result Value Ref Range    Magnesium 2.5 (H) 1.6 - 2.4 mg/dL   PHOSPHORUS    Collection Time: 06/02/22  6:46 PM   Result Value Ref Range    Phosphorus 3.8 2.6 - 4.7 MG/DL   GLUCOSE, POC    Collection Time: 06/02/22  9:29 PM   Result Value Ref Range    Glucose (POC) 236 (H) 65 - 117 mg/dL    Performed by [address] [place] [address], [facility]    Collection Time: 06/03/22  5:40 AM   Result Value Ref Range    Sodium 137 136 - 145 mmol/L    Potassium 5.1 3.5 - 5.1 mmol/L    Chloride 108 97 - 108 mmol/L    CO2 24 21 - 32 mmol/L    Anion gap 5 5 - 15 mmol/L    Glucose 222 (H) 65 - 100 mg/dL    BUN 17 6 - 20 MG/DL    Creatinine 1.33 (H) 0.70 - 1.30 MG/DL    BUN/Creatinine ratio 13 12 - 20      GFR est AA >60 >60 ml/min/1.73m2    GFR est non-AA 53 (L) >60 ml/min/1.73m2    Calcium 8.3 (L) 8.5 - 10.1 MG/DL   MAGNESIUM    Collection Time: 06/03/22  5:40 AM   Result Value Ref Range    Magnesium 2.4 1.6 - 2.4 mg/dL   PHOSPHORUS    Collection Time: 06/03/22  5:40 AM   Result Value Ref Range    Phosphorus 2.3 (L) 2.6 - 4.7 MG/DL   CBC W/O DIFF    Collection Time: 06/03/22  5:40 AM   Result Value Ref Range    WBC 12.1 (H) 4.1 - 11.1 K/uL    RBC 4.82 4.10 - 5.70 M/uL    HGB 14.4 12.1 - 17.0 g/dL    HCT 41.7 36.6 - 50.3 %    MCV 86.5 80.0 - 99.0 FL    MCH 29.9 26.0 - 34.0 PG    MCHC 34.5 30.0 - 36.5 g/dL    RDW 13.7 11.5 - 14.5 %    PLATELET 716 (L) 908 - 400 K/uL    MPV 10.0 8.9 - 12.9 FL    NRBC 0.0 0  WBC    ABSOLUTE NRBC 0.00 0.00 - 0.01 K/uL   GLUCOSE, POC    Collection Time: 06/03/22  5:57 AM   Result Value Ref Range    Glucose (POC) 217 (H) 65 - 117 mg/dL    Performed by Tammy Rose            Assessment:     Principal Problem:    Colonic polyp (6/2/2022)        1 Day Post-Op s/p hand-assisted laparoscopic right hemicolectomy, ileocolostonmy, and primary repair of umbilical hernia. Hemodynamically stable. Hemoglobin stable. Thrombocytopenic. Urine output adequate. Creatinine acceptable. Plan:   Begin low fiber diabetic diet. Ensure Surgery. Fingerstick glucose and sliding scale insulin. Remove Dillard catheter per protocol. Hold Lovenox secondary to thrombocytopenia. Physical therapy. Ambulate. Incentive spirometer.

## 2022-06-03 NOTE — OP NOTES
[address]   OPERATIVE REPORT    Name:  Shauna Yee  MR#:  [id number]  :  1950  ACCOUNT #:  [de-identified]    DATE OF SERVICE:  2022    PREOPERATIVE DIAGNOSIS:  Colonic polyp    POSTOPERATIVE DIAGNOSES:  1. Colonic polyp  2. Umbilical hernia    PROCEDURE PERFORMED:  Hand-assisted laparoscopic right hemicolectomy, ileocolostomy, and primary repair of umbilical hernia    SURGEON:  Diane Cabrales MD    ASSISTANTS:  Abdi Tavarez SA and Hang Ceja SA    ANESTHESIA:  General endotracheal    SPECIMENS REMOVED:  1. Right colon and terminal ileum  2. Omentum    TUBES AND DRAINS:  None    ESTIMATED BLOOD LOSS:  200 mL    IV FLUIDS:  Crystalloid 2300 mL    ALBUMIN:  250 mL    URINE OUTPUT:  20 mL collected, but an unmeasured quantity was found to have leaked from the tubing connector    COMPLICATIONS:  None apparent    IMPLANTS:  None    INDICATIONS FOR PROCEDURE:  The patient is an asymptomatic 81-year-old male who underwent a colonoscopy on 10/06/2021. That procedure was remarkable for sigmoid diverticulosis, two sessile ascending colon polyps, and three sessile cecal polyps. One of the cecal polyps was approximately 2.5 cm in greatest dimension, very flat, and located on the ileocecal valve. All of the polyps were removed, but the ileocecal valve polyp required a piecemeal technique. The pathology report indicated that all of the tissue fragments were consistent with tubular adenoma. A planned follow-up colonoscopy was then performed on 2022 and, during that procedure, a residual large flat polyp was again noted on the ileocecal valve. An attempt to lift it to permit complete excision was not successful. Biopsies were obtained, and the specimens were reportedly normal colonic mucosa. Nevertheless, Dr. Delphina Klinefelter remained highly suspicious that there was residual neoplastic tissue that could not be removed with the colonoscope.   The patient was therefore referred for surgical consultation. The rationale for a colon resection following oncologic principles was discussed with the patient in great detail. He appeared to have understood the risks, including risk that there would be no residual neoplastic disease in the specimen, and he agreed to proceed. 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 There was no evidence of malignancy. 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 There was a small umbilical hernia in which omentum was incarcerated. DESCRIPTION OF PROCEDURE:  After informed consent was obtained, the patient was taken to the operating room where standard monitoring devices were attached and adequate general endotracheal anesthesia was induced. He was then positioned in lithotomy with the lower extremities fitted with pneumatic compression stockings and supported by the padded hydraulic Vikash stirrups. The pPerineum was prepped and draped in the usual sterile fashion, and Syeda Funk MD performed cystoscopy and placement of bilateral ureteral catheters and a Dillard catheter. The urologic procedure had been requested in order to facilitate intraoperative identification of ureters, and once it was completed, the patient was repositioned supine with the left upper extremity tucked, the right upper extremity extended on an arm board, and a footboard and appropriate straps in place. 2 g of cefazolin and 500 mg of metronidazole were administered parenterally. The abdomen was prepped and draped in the usual sterile fashion. It should also be noted that the patient had undergone mechanical and antibiotic bowel preparation at home on the day before the procedure.   Intraoperatively, an orogastric tube was placed by the anesthetist.    The abdomen was entered through a supraumbilical midline incision that was extended through the subcutaneous adipose tissues and the linea alba using the electrocautery. Some adhesions to the anterior abdominal wall inferior to the incision and associated with the umbilical hernia were divided. The incarcerated omentum was reduced. The GelPort was then put in place, and it would function as both wound retractor and wound protector. A 12-mm port was inserted through the GelPort and used to inflate the abdomen. Once pneumoperitoneum was achieved, the 5-mm 30-degree laparoscope was introduced and the abdomen was explored visually. Next, under direct vision, a 5-mm port was placed in the subxiphoid midline. The laparoscope was then moved to the subxiphoid site and the 12-mm port was placed in the left upper quadrant incision. The non-dominant hand was inserted through the [employer] [address]. The articulating EnSeal was deployed through the left upper quadrant port, and it would be used for most of the intracorporeal dissection. The abdomen was reexplored. There were adhesions in the lower abdomen that were apparently related to the patient's previous prostatectomy. It was necessary to perform lysis of adhesions in order to expose and mobilize the right colon and terminal ileum. The amount of the visceral fat was considerable, and this made the operation more difficult and more bloody. Gradually, the right colon was mobilized. The ileocolic vessels were isolated, partially skeletonized, and then divided using the Clyman Flex stapling device with a 60-mm white cartridge. All other vascular structures were divided using the EnSeal, the electrocautery, or sharp division between clamps. Those sharply divided structures were ligated with 2-0 silk ties and 2-0 silk sutures. Care was taken to identify and avoid the right ureter and the duodenum. The operation did not involve any dissection on the left. The colon was gradually mobilized. Eventually, it was mobile enough to extracorporealize through the [employer] [address].   Points were chosen for division in the distal ileum and the proximal transverse colon. The intervening mesentery was gradually divided. The ileum and the transverse colon were placed adjacent to one another. Each was opened using electrocautery, and retained contents were evacuated with suction. The ilieocolic anastomosis was then constructed in a side-to-side (functional end-to-end) fashion using a longitudinal firing of the POLO-75 stapling device with a blue cartridge followed by a transverse firing of the POLO-100 stapling device with a blue cartridge. Before applying the POLO-100, inspection within the bowel lumen revealed the staple line to be apparently intact and the bowel to be well vascularized. After firing the POLO-100, which completed the construction of the anastomosis and also simultaneously completed the resection of the specimen, the specimen was taken to the back table and opened revealing the residual neoplasm. The site was marked with a suture. Gross margins were normal.    Returning to the abdomen after changing gown and gloves, the ileocolic anastomosis was selectively reinforced at multiple sites using seromuscular 3-0 silk sutures. Once completed, inspection and palpation of the anastomosis revealed it to be apparently well vascularized, widely patent, and under no undue tension. The long rent in the mesentery would need to be closed. The bowel was reintroduced into the peritoneal cavity and pneumoperitoneum was once again achieved. The rent in the mesentery was closed using two running 3-0 V-Loc sutures. The abdominal cavity was then re-explored. Hemostasis was judged to be acceptable. The abdomen was irrigated and the irrigant was mostly evacuated. The small intestine was run and found to be without evidence of injury. The laparoscopic instruments were removed, and then two small sheets of Seprafilm were placed on the viscera with none directly on the anastomosis.   The omentum, a substantial portion of which had been amputated, was then draped over the viscera and two more sheets of Seprafilm were put in place anterior to it. The GelPort was then removed. Per protocol, gowns and gloves were changed and the dedicated closing instrument set was opened. The patient was re-draped. The supraumbilical midline fascial closure was performed using two running #1 PDS sutures. The subcutaneous portions of all three wounds were irrigated with saline. Subcutaneous adipose tissue in the supraumbilical midline wound was reapproximated using interrupted 2-0 Vicryl sutures. Skin closure at all three wounds was performed using subcuticular 4-0 Monocryl sutures and Dermabond. There were no apparent complications, and the patient appeared to have tolerated the procedure well. At its conclusion, the orogastric tube and ureteral catheters were removed. The Dillard catheter was left in place. The patient was extubated and transported in stable condition to the recovery room. All sponge, needle, and instrument counts were correct.           Armani Keller MD      [id number]  D:  06/03/2022 0:50  T:  06/03/2022 12:19  JOB #:  [id number]  CC:  MD Angy Kirkland MD Dion Dubois, MD

## 2022-06-03 NOTE — WOUND CARE
Wound care follow up after surgery. Patient does not have an ostomy. Wound care will sign off.     AZALEA Giles, RN, [facility]  Office x [phone]  Pager x [employer]

## 2022-06-03 NOTE — PROGRESS NOTES
RUR: 6% Low    ESTELITA: Anticipated discharge home. Patient's wife will provide transport home once medically stable. Follow-up with PCP/specialist.     Primary Contact: Wife, Liam Barraza, [phone]    Care Management Interventions  PCP Verified by CM: Yes (Peng Duenas)  Mode of Transport at Discharge: Other (see comment) (Wife)  Transition of Care Consult (CM Consult): Discharge Planning  Discharge Durable Medical Equipment: No  Physical Therapy Consult: No  Occupational Therapy Consult: No  Speech Therapy Consult: No  Support Systems: Spouse/Significant Other  Confirm Follow Up Transport: Family  The Plan for Transition of Care is Related to the Following Treatment Goals : Home  Discharge Location  Patient Expects to be Discharged to[de-identified] Home with family assistance    Reason for Admission:  Colonic polyp                   RUR Score:          6% Low            Plan for utilizing home health:      None    PCP: First and Last name:  Tawanda Egan MD     Name of Practice:    Are you a current patient: Yes/No: Yes   Approximate date of last visit: Per patient, seen last week   Can you participate in a virtual visit with your PCP: Yes                    Current Advanced Directive/Advance Care Plan: Full Code    Healthcare Decision Maker:   Click here to complete [address] including selection of the Healthcare Decision Maker Relationship (ie \"Primary\")             Primary Decision MakerEugenia Egan - Spouse - [phone]                  Transition of Care Plan:      Home     CM met with patient and wife at bedside to introduce self and explain role. Patient lives with his wife and son in a 1 story home with 1 step to enter. Patient was independent at baseline with ADL's, IADL's and ambulation. Patient does not own any DME. CM verified patient's PCP, demographics and insurance. Preferred pharmacy is [facility] on [address] [facility] in [facility] with no barriers obtaining needed proscriptions.  Patient's wife will provide transport home once medically stable.     CASI Nath   [phone]

## 2022-06-03 NOTE — PERIOP NOTES
TRANSFER - OUT REPORT:    Verbal report given to Hoa(name) on Kelly Villasenor  being transferred to [facility](unit) for routine post - op       Report consisted of patients Situation, Background, Assessment and   Recommendations(SBAR). Time Pre op antibiotic given:1725  Anesthesia Stop time: 8407    Information from the following report(s) SBAR, OR Summary, Intake/Output, MAR, Recent Results and Cardiac Rhythm SR. was reviewed with the receiving nurse. Opportunity for questions and clarification was provided. Is the patient on 02? NO    Is the patient on a monitor? NO    Is the nurse transporting with the patient? NO    Surgical Waiting Area notified of patient's transfer from PACU? YES      The following personal items collected during your admission accompanied patient upon transfer:   Dental Appliance: Dental Appliances: None  Vision:    Hearing Aid:    Jewelry: Jewelry: None  Clothing: Clothing: With patient  Other Valuables:  Other Valuables: None  Valuables sent to safe:

## 2022-06-03 NOTE — ANESTHESIA POSTPROCEDURE EVALUATION
Procedure(s):  HAND ASSISTED LAPROSCOPIC RIGHT HEMICHOLECTOMY (E R A S),  CYSTOSCOPY WITH INSERTION OF BILATERAL URETERAL STENTS.    general    Anesthesia Post Evaluation        Patient location during evaluation: PACU  Patient participation: complete - patient participated  Level of consciousness: awake and alert  Pain management: adequate  Airway patency: patent  Anesthetic complications: no  Cardiovascular status: acceptable  Respiratory status: acceptable  Hydration status: acceptable  Comments: I have seen and evaluated the patient and is ready for discharge. Katie Cruz MD    Post anesthesia nausea and vomiting:  none      INITIAL Post-op Vital signs:   Vitals Value Taken Time   /52 06/02/22 2015   Temp 36.6 °C (97.9 °F) 06/02/22 1945   Pulse 68 06/02/22 2017   Resp 22 06/02/22 2017   SpO2 100 % 06/02/22 2017   Vitals shown include unvalidated device data.

## 2022-06-04 PROBLEM — E11.65 TYPE 2 DIABETES MELLITUS WITH HYPERGLYCEMIA, WITHOUT LONG-TERM CURRENT USE OF INSULIN (HCC): Chronic | Status: ACTIVE | Noted: 2022-06-04

## 2022-06-04 PROBLEM — D69.6 THROMBOCYTOPENIA (HCC): Status: ACTIVE | Noted: 2022-06-04

## 2022-06-04 PROBLEM — E11.65 TYPE 2 DIABETES MELLITUS WITH HYPERGLYCEMIA, WITHOUT LONG-TERM CURRENT USE OF INSULIN (HCC): Status: ACTIVE | Noted: 2022-06-04

## 2022-06-04 LAB
ANION GAP SERPL CALC-SCNC: 1 MMOL/L (ref 5–15)
BASOPHILS # BLD: 0 K/UL (ref 0–0.1)
BASOPHILS NFR BLD: 1 % (ref 0–1)
BUN SERPL-MCNC: 18 MG/DL (ref 6–20)
BUN/CREAT SERPL: 16 (ref 12–20)
CALCIUM SERPL-MCNC: 8.8 MG/DL (ref 8.5–10.1)
CHLORIDE SERPL-SCNC: 108 MMOL/L (ref 97–108)
CO2 SERPL-SCNC: 29 MMOL/L (ref 21–32)
CREAT SERPL-MCNC: 1.1 MG/DL (ref 0.7–1.3)
DIFFERENTIAL METHOD BLD: ABNORMAL
EOSINOPHIL # BLD: 0 K/UL (ref 0–0.4)
EOSINOPHIL NFR BLD: 1 % (ref 0–7)
ERYTHROCYTE [DISTWIDTH] IN BLOOD BY AUTOMATED COUNT: 13.8 % (ref 11.5–14.5)
GLUCOSE BLD STRIP.AUTO-MCNC: 151 MG/DL (ref 65–117)
GLUCOSE BLD STRIP.AUTO-MCNC: 156 MG/DL (ref 65–117)
GLUCOSE BLD STRIP.AUTO-MCNC: 178 MG/DL (ref 65–117)
GLUCOSE BLD STRIP.AUTO-MCNC: 224 MG/DL (ref 65–117)
GLUCOSE SERPL-MCNC: 188 MG/DL (ref 65–100)
HCT VFR BLD AUTO: 39.6 % (ref 36.6–50.3)
HGB BLD-MCNC: 13.1 G/DL (ref 12.1–17)
IMM GRANULOCYTES # BLD AUTO: 0 K/UL (ref 0–0.04)
IMM GRANULOCYTES NFR BLD AUTO: 1 % (ref 0–0.5)
LYMPHOCYTES # BLD: 1.5 K/UL (ref 0.8–3.5)
LYMPHOCYTES NFR BLD: 17 % (ref 12–49)
MCH RBC QN AUTO: 29 PG (ref 26–34)
MCHC RBC AUTO-ENTMCNC: 33.1 G/DL (ref 30–36.5)
MCV RBC AUTO: 87.8 FL (ref 80–99)
MONOCYTES # BLD: 1.1 K/UL (ref 0–1)
MONOCYTES NFR BLD: 12 % (ref 5–13)
NEUTS SEG # BLD: 6.1 K/UL (ref 1.8–8)
NEUTS SEG NFR BLD: 70 % (ref 32–75)
NRBC # BLD: 0 K/UL (ref 0–0.01)
NRBC BLD-RTO: 0 PER 100 WBC
PHOSPHATE SERPL-MCNC: 1.6 MG/DL (ref 2.6–4.7)
PLATELET # BLD AUTO: 121 K/UL (ref 150–400)
PMV BLD AUTO: 9.9 FL (ref 8.9–12.9)
POTASSIUM SERPL-SCNC: 4.5 MMOL/L (ref 3.5–5.1)
RBC # BLD AUTO: 4.51 M/UL (ref 4.1–5.7)
SERVICE CMNT-IMP: ABNORMAL
SODIUM SERPL-SCNC: 138 MMOL/L (ref 136–145)
WBC # BLD AUTO: 8.7 K/UL (ref 4.1–11.1)

## 2022-06-04 PROCEDURE — 74011250636 HC RX REV CODE- 250/636: Performed by: COLON & RECTAL SURGERY

## 2022-06-04 PROCEDURE — 74011250637 HC RX REV CODE- 250/637: Performed by: COLON & RECTAL SURGERY

## 2022-06-04 PROCEDURE — 65270000032 HC RM SEMIPRIVATE

## 2022-06-04 PROCEDURE — 82962 GLUCOSE BLOOD TEST: CPT

## 2022-06-04 PROCEDURE — 85025 COMPLETE CBC W/AUTO DIFF WBC: CPT

## 2022-06-04 PROCEDURE — 74011000250 HC RX REV CODE- 250: Performed by: COLON & RECTAL SURGERY

## 2022-06-04 PROCEDURE — 84100 ASSAY OF PHOSPHORUS: CPT

## 2022-06-04 PROCEDURE — 80048 BASIC METABOLIC PNL TOTAL CA: CPT

## 2022-06-04 PROCEDURE — 36415 COLL VENOUS BLD VENIPUNCTURE: CPT

## 2022-06-04 PROCEDURE — 74011636637 HC RX REV CODE- 636/637: Performed by: COLON & RECTAL SURGERY

## 2022-06-04 RX ADMIN — NALOXEGOL OXALATE 25 MG: 12.5 TABLET, FILM COATED ORAL at 09:22

## 2022-06-04 RX ADMIN — ACETAMINOPHEN 1000 MG: 500 TABLET ORAL at 12:50

## 2022-06-04 RX ADMIN — LIDOCAINE HYDROCHLORIDE ANHYDROUS AND DEXTROSE MONOHYDRATE 1 MG/KG/HR: .8; 5 INJECTION, SOLUTION INTRAVENOUS at 09:39

## 2022-06-04 RX ADMIN — LEVOTHYROXINE SODIUM 175 MCG: 0.15 TABLET ORAL at 09:22

## 2022-06-04 RX ADMIN — CETIRIZINE HYDROCHLORIDE 10 MG: 10 TABLET, FILM COATED ORAL at 09:22

## 2022-06-04 RX ADMIN — OXYCODONE 5 MG: 5 TABLET ORAL at 09:35

## 2022-06-04 RX ADMIN — AMLODIPINE BESYLATE 10 MG: 5 TABLET ORAL at 23:16

## 2022-06-04 RX ADMIN — METOPROLOL SUCCINATE 100 MG: 50 TABLET, EXTENDED RELEASE ORAL at 09:21

## 2022-06-04 RX ADMIN — SODIUM CHLORIDE, PRESERVATIVE FREE 10 ML: 5 INJECTION INTRAVENOUS at 23:17

## 2022-06-04 RX ADMIN — ACETAMINOPHEN 1000 MG: 500 TABLET ORAL at 06:44

## 2022-06-04 RX ADMIN — ACETAMINOPHEN 1000 MG: 500 TABLET ORAL at 00:17

## 2022-06-04 RX ADMIN — PANTOPRAZOLE SODIUM 40 MG: 40 TABLET, DELAYED RELEASE ORAL at 06:44

## 2022-06-04 RX ADMIN — ACETAMINOPHEN 1000 MG: 500 TABLET ORAL at 23:16

## 2022-06-04 RX ADMIN — Medication 2 UNITS: at 06:45

## 2022-06-04 RX ADMIN — SODIUM CHLORIDE, PRESERVATIVE FREE 10 ML: 5 INJECTION INTRAVENOUS at 06:46

## 2022-06-04 RX ADMIN — DIBASIC SODIUM PHOSPHATE, MONOBASIC POTASSIUM PHOSPHATE AND MONOBASIC SODIUM PHOSPHATE 1 TABLET: 852; 155; 130 TABLET ORAL at 17:04

## 2022-06-04 RX ADMIN — SODIUM CHLORIDE, PRESERVATIVE FREE 10 ML: 5 INJECTION INTRAVENOUS at 14:00

## 2022-06-04 RX ADMIN — ACETAMINOPHEN 1000 MG: 500 TABLET ORAL at 18:00

## 2022-06-04 RX ADMIN — Medication 3 UNITS: at 12:17

## 2022-06-04 RX ADMIN — DIBASIC SODIUM PHOSPHATE, MONOBASIC POTASSIUM PHOSPHATE AND MONOBASIC SODIUM PHOSPHATE 1 TABLET: 852; 155; 130 TABLET ORAL at 09:22

## 2022-06-04 RX ADMIN — Medication 2 UNITS: at 17:04

## 2022-06-04 NOTE — PROGRESS NOTES
Physician Progress Note      PATIENT:               Mckenzie Jamison  CSN #:                  [id number]  :                       1950  ADMIT DATE:       2022 10:24 AM  DISCH DATE:  RESPONDING  PROVIDER #:        Miguelito Bauer MD          QUERY TEXT:    Patient admitted with colon polyp . Pt noted to have documented diabetes requiring sliding scale insulin). Please document in progress notes and discharge summary further specificity regarding the control status of DM: The medical record reflects the following:  Risk Factors: DM  Clinical Indicators:  2022 21:29  GLUCOSE,FAST - POC: 236 (H)    6/3/2022 05:57  GLUCOSE,FAST - POC: 211 (H)    6/3/2022 11:35  GLUCOSE,FAST - POC: 248 (H)    2022 18:46  Glucose: 263 (H)    6/3/2022 05:40  Glucose: 222 (H)    Treatment: POC glucose monitoring, lispro sliding scale insulin    Thank you,  Nadia Pike RN, CDI, CRCR, CCDS  Certified Clinical Documentation Integrity  Specialist  [phone]  You can also contact me via [phone] provided:  -- Hyperglycemia due to DM type II  -- Hyperglycemia not related to DM type II  -- Other - I will add my own diagnosis  -- Disagree - Not applicable / Not valid  -- Disagree - Clinically unable to determine / Unknown  -- Refer to Clinical Documentation Reviewer    PROVIDER RESPONSE TEXT:    This patient has Hyperglycemia due to DM type II.     Query created by: Yuly Barcenas on 6/3/2022 12:57 PM      Electronically signed by:  Miguelito Bauer MD 2022 6:46 AM

## 2022-06-04 NOTE — PROGRESS NOTES
General Daily Progress Note    Admission Date: 6/2/2022  Hospital Day 3  Post-Op Day 2  Subjective:   Pain well controlled. No nausea. No flatus. Voiding well. Objective:   Patient Vitals for the past 24 hrs:   BP Temp Pulse Resp SpO2   06/04/22 0758 133/70 98.6 °F (37 °C) 62 18 92 %   06/04/22 0018 (!) 144/69 99 °F (37.2 °C) 70 18 91 %   06/03/22 2049 (!) 155/79 98.5 °F (36.9 °C) 75 18 93 %   06/03/22 1516 (!) 151/70 98.2 °F (36.8 °C) 73 18 92 %     No intake/output data recorded. 06/02 1901 - 06/04 0700  In: 338 [P.O.:240; I.V.:98]  Out: 2550 [Urine:2550]      Physical Examination:  General Appearance - No distress  Abdomen - Distended but soft and appropriately tender. Incisions clean, dry, intact, and with ecchymosis but no erythema. Extremities - Pneumatic compression stockings in use. No calf tenderness.             Data Review   Recent Results (from the past 24 hour(s))   GLUCOSE, POC    Collection Time: 06/03/22 11:35 AM   Result Value Ref Range    Glucose (POC) 248 (H) 65 - 117 mg/dL    Performed by Vicente Huitron, POC    Collection Time: 06/03/22  3:47 PM   Result Value Ref Range    Glucose (POC) 205 (H) 65 - 117 mg/dL    Performed by Vicente Huitron, POC    Collection Time: 06/03/22  9:51 PM   Result Value Ref Range    Glucose (POC) 199 (H) 65 - 117 mg/dL    Performed by ANDREI Gray    CBC WITH AUTOMATED DIFF    Collection Time: 06/04/22  3:47 AM   Result Value Ref Range    WBC 8.7 4.1 - 11.1 K/uL    RBC 4.51 4.10 - 5.70 M/uL    HGB 13.1 12.1 - 17.0 g/dL    HCT 39.6 36.6 - 50.3 %    MCV 87.8 80.0 - 99.0 FL    MCH 29.0 26.0 - 34.0 PG    MCHC 33.1 30.0 - 36.5 g/dL    RDW 13.8 11.5 - 14.5 %    PLATELET 191 (L) 562 - 400 K/uL    MPV 9.9 8.9 - 12.9 FL    NRBC 0.0 0  WBC    ABSOLUTE NRBC 0.00 0.00 - 0.01 K/uL    NEUTROPHILS 70 32 - 75 %    LYMPHOCYTES 17 12 - 49 %    MONOCYTES 12 5 - 13 %    EOSINOPHILS 1 0 - 7 %    BASOPHILS 1 0 - 1 %    IMMATURE GRANULOCYTES 1 (H) 0.0 - 0.5 %    ABS. NEUTROPHILS 6.1 1.8 - 8.0 K/UL    ABS. LYMPHOCYTES 1.5 0.8 - 3.5 K/UL    ABS. MONOCYTES 1.1 (H) 0.0 - 1.0 K/UL    ABS. EOSINOPHILS 0.0 0.0 - 0.4 K/UL    ABS. BASOPHILS 0.0 0.0 - 0.1 K/UL    ABS. IMM. GRANS. 0.0 0.00 - 0.04 K/UL    DF AUTOMATED     METABOLIC PANEL, BASIC    Collection Time: 06/04/22  3:47 AM   Result Value Ref Range    Sodium 138 136 - 145 mmol/L    Potassium 4.5 3.5 - 5.1 mmol/L    Chloride 108 97 - 108 mmol/L    CO2 29 21 - 32 mmol/L    Anion gap 1 (L) 5 - 15 mmol/L    Glucose 188 (H) 65 - 100 mg/dL    BUN 18 6 - 20 MG/DL    Creatinine 1.10 0.70 - 1.30 MG/DL    BUN/Creatinine ratio 16 12 - 20      GFR est AA >60 >60 ml/min/1.73m2    GFR est non-AA >60 >60 ml/min/1.73m2    Calcium 8.8 8.5 - 10.1 MG/DL   PHOSPHORUS    Collection Time: 06/04/22  3:47 AM   Result Value Ref Range    Phosphorus 1.6 (L) 2.6 - 4.7 MG/DL   GLUCOSE, POC    Collection Time: 06/04/22  6:09 AM   Result Value Ref Range    Glucose (POC) 178 (H) 65 - 117 mg/dL    Performed by Kevin Leyva            Assessment:     Principal Problem:    Colonic polyp (6/2/2022)    Active Problems:    Type 2 diabetes mellitus with hyperglycemia, without long-term current use of insulin ([address] (6/4/2022)      Thrombocytopenia ([address] (6/4/2022)        2 Days Post-Op s/p hand-assisted laparoscopic right hemicolectomy, ileocolostonmy, and primary repair of umbilical hernia.     Hemodynamically stable. Hemoglobin stable. Thrombocytopenic.     Urine output adequate. Creatinine acceptable. Hypophosphatemic. Awaiting return of GI function. Plan:   Continue low fiber diabetic diet and Ensure Surgery. Continue fingerstick glucose and sliding scale insulin. Continue to hold Lovenox secondary to thrombocytopenia. Replace phosphorus. Physical therapy. Ambulate. Incentive spirometer.

## 2022-06-05 LAB
ANION GAP SERPL CALC-SCNC: 6 MMOL/L (ref 5–15)
BASOPHILS # BLD: 0.1 K/UL (ref 0–0.1)
BASOPHILS NFR BLD: 1 % (ref 0–1)
BUN SERPL-MCNC: 17 MG/DL (ref 6–20)
BUN/CREAT SERPL: 16 (ref 12–20)
CALCIUM SERPL-MCNC: 8.8 MG/DL (ref 8.5–10.1)
CHLORIDE SERPL-SCNC: 107 MMOL/L (ref 97–108)
CO2 SERPL-SCNC: 20 MMOL/L (ref 21–32)
CREAT SERPL-MCNC: 1.04 MG/DL (ref 0.7–1.3)
DIFFERENTIAL METHOD BLD: ABNORMAL
EOSINOPHIL # BLD: 0.2 K/UL (ref 0–0.4)
EOSINOPHIL NFR BLD: 2 % (ref 0–7)
ERYTHROCYTE [DISTWIDTH] IN BLOOD BY AUTOMATED COUNT: 13.6 % (ref 11.5–14.5)
GLUCOSE BLD STRIP.AUTO-MCNC: 135 MG/DL (ref 65–117)
GLUCOSE BLD STRIP.AUTO-MCNC: 162 MG/DL (ref 65–117)
GLUCOSE BLD STRIP.AUTO-MCNC: 203 MG/DL (ref 65–117)
GLUCOSE BLD STRIP.AUTO-MCNC: 212 MG/DL (ref 65–117)
GLUCOSE SERPL-MCNC: 161 MG/DL (ref 65–100)
HCT VFR BLD AUTO: 41 % (ref 36.6–50.3)
HGB BLD-MCNC: 13.8 G/DL (ref 12.1–17)
IMM GRANULOCYTES # BLD AUTO: 0.1 K/UL (ref 0–0.04)
IMM GRANULOCYTES NFR BLD AUTO: 1 % (ref 0–0.5)
LYMPHOCYTES # BLD: 1.1 K/UL (ref 0.8–3.5)
LYMPHOCYTES NFR BLD: 15 % (ref 12–49)
MCH RBC QN AUTO: 29.3 PG (ref 26–34)
MCHC RBC AUTO-ENTMCNC: 33.7 G/DL (ref 30–36.5)
MCV RBC AUTO: 87 FL (ref 80–99)
MONOCYTES # BLD: 0.9 K/UL (ref 0–1)
MONOCYTES NFR BLD: 12 % (ref 5–13)
NEUTS SEG # BLD: 5.1 K/UL (ref 1.8–8)
NEUTS SEG NFR BLD: 69 % (ref 32–75)
NRBC # BLD: 0 K/UL (ref 0–0.01)
NRBC BLD-RTO: 0 PER 100 WBC
PHOSPHATE SERPL-MCNC: 2.8 MG/DL (ref 2.6–4.7)
PLATELET # BLD AUTO: 128 K/UL (ref 150–400)
PMV BLD AUTO: 9.6 FL (ref 8.9–12.9)
POTASSIUM SERPL-SCNC: 5.2 MMOL/L (ref 3.5–5.1)
RBC # BLD AUTO: 4.71 M/UL (ref 4.1–5.7)
SERVICE CMNT-IMP: ABNORMAL
SODIUM SERPL-SCNC: 133 MMOL/L (ref 136–145)
WBC # BLD AUTO: 7.3 K/UL (ref 4.1–11.1)

## 2022-06-05 PROCEDURE — 85025 COMPLETE CBC W/AUTO DIFF WBC: CPT

## 2022-06-05 PROCEDURE — 36415 COLL VENOUS BLD VENIPUNCTURE: CPT

## 2022-06-05 PROCEDURE — 82962 GLUCOSE BLOOD TEST: CPT

## 2022-06-05 PROCEDURE — 84100 ASSAY OF PHOSPHORUS: CPT

## 2022-06-05 PROCEDURE — 80048 BASIC METABOLIC PNL TOTAL CA: CPT

## 2022-06-05 PROCEDURE — 74011000250 HC RX REV CODE- 250: Performed by: COLON & RECTAL SURGERY

## 2022-06-05 PROCEDURE — 74011636637 HC RX REV CODE- 636/637: Performed by: COLON & RECTAL SURGERY

## 2022-06-05 PROCEDURE — 74011250637 HC RX REV CODE- 250/637: Performed by: COLON & RECTAL SURGERY

## 2022-06-05 PROCEDURE — 65270000032 HC RM SEMIPRIVATE

## 2022-06-05 PROCEDURE — 74011250636 HC RX REV CODE- 250/636: Performed by: COLON & RECTAL SURGERY

## 2022-06-05 RX ORDER — PROCHLORPERAZINE EDISYLATE 5 MG/ML
10 INJECTION INTRAMUSCULAR; INTRAVENOUS
Status: DISCONTINUED | OUTPATIENT
Start: 2022-06-05 | End: 2022-06-08 | Stop reason: HOSPADM

## 2022-06-05 RX ORDER — DIPHENHYDRAMINE HCL 25 MG
50 CAPSULE ORAL
Status: DISCONTINUED | OUTPATIENT
Start: 2022-06-05 | End: 2022-06-08 | Stop reason: HOSPADM

## 2022-06-05 RX ADMIN — Medication 2 UNITS: at 17:37

## 2022-06-05 RX ADMIN — ACETAMINOPHEN 1000 MG: 500 TABLET ORAL at 23:13

## 2022-06-05 RX ADMIN — PROCHLORPERAZINE EDISYLATE 10 MG: 5 INJECTION, SOLUTION INTRAMUSCULAR; INTRAVENOUS at 23:12

## 2022-06-05 RX ADMIN — DIPHENHYDRAMINE HYDROCHLORIDE 50 MG: 25 CAPSULE ORAL at 23:13

## 2022-06-05 RX ADMIN — ACETAMINOPHEN 1000 MG: 500 TABLET ORAL at 17:37

## 2022-06-05 RX ADMIN — PANTOPRAZOLE SODIUM 40 MG: 40 TABLET, DELAYED RELEASE ORAL at 06:39

## 2022-06-05 RX ADMIN — DIBASIC SODIUM PHOSPHATE, MONOBASIC POTASSIUM PHOSPHATE AND MONOBASIC SODIUM PHOSPHATE 1 TABLET: 852; 155; 130 TABLET ORAL at 17:37

## 2022-06-05 RX ADMIN — SODIUM CHLORIDE, PRESERVATIVE FREE 10 ML: 5 INJECTION INTRAVENOUS at 06:40

## 2022-06-05 RX ADMIN — LEVOTHYROXINE SODIUM 175 MCG: 0.15 TABLET ORAL at 09:23

## 2022-06-05 RX ADMIN — Medication 2 UNITS: at 22:01

## 2022-06-05 RX ADMIN — METOPROLOL SUCCINATE 100 MG: 50 TABLET, EXTENDED RELEASE ORAL at 09:22

## 2022-06-05 RX ADMIN — SODIUM CHLORIDE, PRESERVATIVE FREE 10 ML: 5 INJECTION INTRAVENOUS at 13:32

## 2022-06-05 RX ADMIN — NALOXEGOL OXALATE 25 MG: 12.5 TABLET, FILM COATED ORAL at 09:23

## 2022-06-05 RX ADMIN — OXYCODONE 5 MG: 5 TABLET ORAL at 16:27

## 2022-06-05 RX ADMIN — Medication 3 UNITS: at 11:56

## 2022-06-05 RX ADMIN — AMLODIPINE BESYLATE 10 MG: 5 TABLET ORAL at 21:35

## 2022-06-05 RX ADMIN — DIBASIC SODIUM PHOSPHATE, MONOBASIC POTASSIUM PHOSPHATE AND MONOBASIC SODIUM PHOSPHATE 1 TABLET: 852; 155; 130 TABLET ORAL at 09:23

## 2022-06-05 RX ADMIN — ONDANSETRON HYDROCHLORIDE 4 MG: 2 SOLUTION INTRAMUSCULAR; INTRAVENOUS at 19:07

## 2022-06-05 RX ADMIN — ACETAMINOPHEN 1000 MG: 500 TABLET ORAL at 06:39

## 2022-06-05 RX ADMIN — CETIRIZINE HYDROCHLORIDE 10 MG: 10 TABLET, FILM COATED ORAL at 09:23

## 2022-06-05 RX ADMIN — SODIUM CHLORIDE, PRESERVATIVE FREE 10 ML: 5 INJECTION INTRAVENOUS at 21:36

## 2022-06-05 NOTE — PROGRESS NOTES
General Daily Progress Note    Admission Date: 6/2/2022  Hospital Day 4  Post-Op Day 3  Subjective:   Had severe abdominal cramps during the night that were eventually relieved by the passage of flatus and a small amount of stool. Objective:   Patient Vitals for the past 24 hrs:   BP Temp Pulse Resp SpO2   06/04/22 2252 (!) 148/77 98.2 °F (36.8 °C) 60 18 91 %   06/04/22 2004 (!) 148/72 98.8 °F (37.1 °C) 63 18 91 %   06/04/22 1436 129/74 98.7 °F (37.1 °C) 66 18 95 %     No intake/output data recorded. 06/03 1901 - 06/05 0700  In: -   Out: 750 [Urine:750]    Physical Examination:  General Appearance - No distress  Abdomen - Still distended but soft and appropriately tender.  Incisions clean, dry, intact, and with ecchymosis but no erythema. Extremities - Pneumatic compression stockings in use. Data Review   Recent Results (from the past 24 hour(s))   GLUCOSE, POC    Collection Time: 06/04/22 11:46 AM   Result Value Ref Range    Glucose (POC) 224 (H) 65 - 117 mg/dL    Performed by [employer]    GLUCOSE, POC    Collection Time: 06/04/22  4:39 PM   Result Value Ref Range    Glucose (POC) 156 (H) 65 - 117 mg/dL    Performed by [employer]    GLUCOSE, POC    Collection Time: 06/04/22  9:33 PM   Result Value Ref Range    Glucose (POC) 151 (H) 65 - 117 mg/dL    Performed by Leo Boudreaux   PCT    CBC WITH AUTOMATED DIFF    Collection Time: 06/05/22  3:59 AM   Result Value Ref Range    WBC 7.3 4.1 - 11.1 K/uL    RBC 4.71 4.10 - 5.70 M/uL    HGB 13.8 12.1 - 17.0 g/dL    HCT 41.0 36.6 - 50.3 %    MCV 87.0 80.0 - 99.0 FL    MCH 29.3 26.0 - 34.0 PG    MCHC 33.7 30.0 - 36.5 g/dL    RDW 13.6 11.5 - 14.5 %    PLATELET 626 (L) 225 - 400 K/uL    MPV 9.6 8.9 - 12.9 FL    NRBC 0.0 0  WBC    ABSOLUTE NRBC 0.00 0.00 - 0.01 K/uL    NEUTROPHILS 69 32 - 75 %    LYMPHOCYTES 15 12 - 49 %    MONOCYTES 12 5 - 13 %    EOSINOPHILS 2 0 - 7 %    BASOPHILS 1 0 - 1 %    IMMATURE GRANULOCYTES 1 (H) 0.0 - 0.5 %    ABS. NEUTROPHILS 5.1 1.8 - 8.0 K/UL    ABS. LYMPHOCYTES 1.1 0.8 - 3.5 K/UL    ABS. MONOCYTES 0.9 0.0 - 1.0 K/UL    ABS. EOSINOPHILS 0.2 0.0 - 0.4 K/UL    ABS. BASOPHILS 0.1 0.0 - 0.1 K/UL    ABS. IMM. GRANS. 0.1 (H) 0.00 - 0.04 K/UL    DF AUTOMATED     METABOLIC PANEL, BASIC    Collection Time: 06/05/22  3:59 AM   Result Value Ref Range    Sodium 133 (L) 136 - 145 mmol/L    Potassium 5.2 (H) 3.5 - 5.1 mmol/L    Chloride 107 97 - 108 mmol/L    CO2 20 (L) 21 - 32 mmol/L    Anion gap 6 5 - 15 mmol/L    Glucose 161 (H) 65 - 100 mg/dL    BUN 17 6 - 20 MG/DL    Creatinine 1.04 0.70 - 1.30 MG/DL    BUN/Creatinine ratio 16 12 - 20      GFR est AA >60 >60 ml/min/1.73m2    GFR est non-AA >60 >60 ml/min/1.73m2    Calcium 8.8 8.5 - 10.1 MG/DL   PHOSPHORUS    Collection Time: 06/05/22  3:59 AM   Result Value Ref Range    Phosphorus 2.8 2.6 - 4.7 MG/DL   GLUCOSE, POC    Collection Time: 06/05/22  6:07 AM   Result Value Ref Range    Glucose (POC) 135 (H) 65 - 117 mg/dL    Performed by Lencho Dickerson            Assessment:     Principal Problem:    Colonic polyp (6/2/2022)    Active Problems:    Type 2 diabetes mellitus with hyperglycemia, without long-term current use of insulin ([address] (6/4/2022)      Thrombocytopenia ([address] (6/4/2022)        3 Days Post-Op s/p hand-assisted laparoscopic right hemicolectomy, ileocolostonmy, and primary repair of umbilical hernia.     Hemodynamically stable. Hemoglobin stable. Thrombocytopenia stable. .     Urine output adequate. Creatinine within normal limits.     Hypophosphatemia corrected.     GI function is beginning to return, but the patient is not yet fit for discharge. Plan:   Continue low fiber diabetic diet and Ensure Surgery. Continue fingerstick glucose and sliding scale insulin.     Continue to hold Lovenox secondary to thrombocytopenia.     Physical therapy. Ambulate. Incentive spirometer.

## 2022-06-05 NOTE — PROGRESS NOTES
Problem: Falls - Risk of  Goal: *Absence of Falls  Description: Document Charla Johnson Fall Risk and appropriate interventions in the flowsheet.   Outcome: Progressing Towards Goal  Note: Fall Risk Interventions:  Mobility Interventions: Communicate number of staff needed for ambulation/transfer,Patient to call before getting OOB,OT consult for ADLs,PT Consult for mobility concerns,PT Consult for assist device competence         Medication Interventions: Evaluate medications/consider consulting pharmacy,Patient to call before getting OOB,Teach patient to arise slowly

## 2022-06-05 NOTE — PROGRESS NOTES
Bedside and Verbal shift change report given to Ezra Snyder RN (oncoming nurse) by Mildred Whiting RN (offgoing nurse). Report included the following information SBAR, Kardex, Intake/Output and MAR.

## 2022-06-06 LAB
ANION GAP SERPL CALC-SCNC: 4 MMOL/L (ref 5–15)
BASOPHILS # BLD: 0.1 K/UL (ref 0–0.1)
BASOPHILS NFR BLD: 1 % (ref 0–1)
BUN SERPL-MCNC: 27 MG/DL (ref 6–20)
BUN/CREAT SERPL: 20 (ref 12–20)
CALCIUM SERPL-MCNC: 9.5 MG/DL (ref 8.5–10.1)
CHLORIDE SERPL-SCNC: 101 MMOL/L (ref 97–108)
CO2 SERPL-SCNC: 30 MMOL/L (ref 21–32)
CREAT SERPL-MCNC: 1.36 MG/DL (ref 0.7–1.3)
DIFFERENTIAL METHOD BLD: ABNORMAL
EOSINOPHIL # BLD: 0.2 K/UL (ref 0–0.4)
EOSINOPHIL NFR BLD: 2 % (ref 0–7)
ERYTHROCYTE [DISTWIDTH] IN BLOOD BY AUTOMATED COUNT: 13.4 % (ref 11.5–14.5)
GLUCOSE BLD STRIP.AUTO-MCNC: 159 MG/DL (ref 65–117)
GLUCOSE BLD STRIP.AUTO-MCNC: 183 MG/DL (ref 65–117)
GLUCOSE BLD STRIP.AUTO-MCNC: 208 MG/DL (ref 65–117)
GLUCOSE BLD STRIP.AUTO-MCNC: 226 MG/DL (ref 65–117)
GLUCOSE SERPL-MCNC: 211 MG/DL (ref 65–100)
HCT VFR BLD AUTO: 43.6 % (ref 36.6–50.3)
HGB BLD-MCNC: 15.4 G/DL (ref 12.1–17)
IMM GRANULOCYTES # BLD AUTO: 0.1 K/UL (ref 0–0.04)
IMM GRANULOCYTES NFR BLD AUTO: 1 % (ref 0–0.5)
LYMPHOCYTES # BLD: 1.4 K/UL (ref 0.8–3.5)
LYMPHOCYTES NFR BLD: 16 % (ref 12–49)
MCH RBC QN AUTO: 29.9 PG (ref 26–34)
MCHC RBC AUTO-ENTMCNC: 35.3 G/DL (ref 30–36.5)
MCV RBC AUTO: 84.7 FL (ref 80–99)
MONOCYTES # BLD: 1.3 K/UL (ref 0–1)
MONOCYTES NFR BLD: 14 % (ref 5–13)
NEUTS SEG # BLD: 6.2 K/UL (ref 1.8–8)
NEUTS SEG NFR BLD: 66 % (ref 32–75)
NRBC # BLD: 0 K/UL (ref 0–0.01)
NRBC BLD-RTO: 0 PER 100 WBC
PLATELET # BLD AUTO: 201 K/UL (ref 150–400)
PMV BLD AUTO: 9.8 FL (ref 8.9–12.9)
POTASSIUM SERPL-SCNC: 4.2 MMOL/L (ref 3.5–5.1)
RBC # BLD AUTO: 5.15 M/UL (ref 4.1–5.7)
SERVICE CMNT-IMP: ABNORMAL
SODIUM SERPL-SCNC: 135 MMOL/L (ref 136–145)
WBC # BLD AUTO: 9.2 K/UL (ref 4.1–11.1)

## 2022-06-06 PROCEDURE — 74011636637 HC RX REV CODE- 636/637: Performed by: COLON & RECTAL SURGERY

## 2022-06-06 PROCEDURE — 74011250637 HC RX REV CODE- 250/637: Performed by: COLON & RECTAL SURGERY

## 2022-06-06 PROCEDURE — 65270000032 HC RM SEMIPRIVATE

## 2022-06-06 PROCEDURE — 74011000250 HC RX REV CODE- 250: Performed by: COLON & RECTAL SURGERY

## 2022-06-06 PROCEDURE — 80048 BASIC METABOLIC PNL TOTAL CA: CPT

## 2022-06-06 PROCEDURE — 36415 COLL VENOUS BLD VENIPUNCTURE: CPT

## 2022-06-06 PROCEDURE — 82962 GLUCOSE BLOOD TEST: CPT

## 2022-06-06 PROCEDURE — 85025 COMPLETE CBC W/AUTO DIFF WBC: CPT

## 2022-06-06 RX ORDER — GABAPENTIN 300 MG/1
300 CAPSULE ORAL
Status: DISCONTINUED | OUTPATIENT
Start: 2022-06-06 | End: 2022-06-08 | Stop reason: HOSPADM

## 2022-06-06 RX ORDER — HYDROMORPHONE HYDROCHLORIDE 2 MG/1
1-2 TABLET ORAL
Status: DISCONTINUED | OUTPATIENT
Start: 2022-06-06 | End: 2022-06-08 | Stop reason: HOSPADM

## 2022-06-06 RX ADMIN — AMLODIPINE BESYLATE 10 MG: 5 TABLET ORAL at 22:13

## 2022-06-06 RX ADMIN — SODIUM CHLORIDE, PRESERVATIVE FREE 10 ML: 5 INJECTION INTRAVENOUS at 22:00

## 2022-06-06 RX ADMIN — SODIUM CHLORIDE, PRESERVATIVE FREE 10 ML: 5 INJECTION INTRAVENOUS at 14:00

## 2022-06-06 RX ADMIN — Medication 2 UNITS: at 08:02

## 2022-06-06 RX ADMIN — Medication 2 UNITS: at 12:52

## 2022-06-06 RX ADMIN — NALOXEGOL OXALATE 25 MG: 12.5 TABLET, FILM COATED ORAL at 09:47

## 2022-06-06 RX ADMIN — PANTOPRAZOLE SODIUM 40 MG: 40 TABLET, DELAYED RELEASE ORAL at 08:02

## 2022-06-06 RX ADMIN — Medication 3 UNITS: at 17:29

## 2022-06-06 RX ADMIN — ACETAMINOPHEN 1000 MG: 500 TABLET ORAL at 17:29

## 2022-06-06 RX ADMIN — METOPROLOL SUCCINATE 100 MG: 50 TABLET, EXTENDED RELEASE ORAL at 09:47

## 2022-06-06 RX ADMIN — SODIUM CHLORIDE, PRESERVATIVE FREE 10 ML: 5 INJECTION INTRAVENOUS at 06:00

## 2022-06-06 RX ADMIN — ACETAMINOPHEN 1000 MG: 500 TABLET ORAL at 12:51

## 2022-06-06 RX ADMIN — CETIRIZINE HYDROCHLORIDE 10 MG: 10 TABLET, FILM COATED ORAL at 09:46

## 2022-06-06 RX ADMIN — LEVOTHYROXINE SODIUM 175 MCG: 0.15 TABLET ORAL at 12:51

## 2022-06-06 RX ADMIN — GABAPENTIN 300 MG: 300 CAPSULE ORAL at 22:13

## 2022-06-06 NOTE — PROGRESS NOTES
General Daily Progress Note    Admission Date: 6/2/2022  Hospital Day 5  Post-Op Day 4  Subjective:   Two episodes of emesis yesterday. Still passing a small amount of flatus and stool. Voiding well. Ambulating well. Objective:   Patient Vitals for the past 24 hrs:   BP Temp Pulse Resp SpO2   06/06/22 0733 120/67 98 °F (36.7 °C) 73 18 91 %   06/05/22 2323 123/70 98.1 °F (36.7 °C) 77 17 92 %   06/05/22 2134 136/82 98.1 °F (36.7 °C) 83 17 94 %   06/05/22 1929 -- 98.6 °F (37 °C) -- -- --   06/05/22 1912 133/82 99.1 °F (37.3 °C) 96 18 95 %   06/05/22 0919 -- -- 67 -- 92 %   06/05/22 0917 (!) 148/71 98.3 °F (36.8 °C) 68 18 94 %     No intake/output data recorded. No intake/output data recorded. Physical Examination:  General Appearance - No distress  Abdomen - Distension still significant, but soft and appropriately tender. Incisions clean, dry, intact, and with ecchymosis but no erythema.            Data Review   Recent Results (from the past 24 hour(s))   GLUCOSE, POC    Collection Time: 06/05/22 11:25 AM   Result Value Ref Range    Glucose (POC) 203 (H) 65 - 117 mg/dL    Performed by indeni    GLUCOSE, POC    Collection Time: 06/05/22  4:30 PM   Result Value Ref Range    Glucose (POC) 162 (H) 65 - 117 mg/dL    Performed by indeni    GLUCOSE, POC    Collection Time: 06/05/22  9:43 PM   Result Value Ref Range    Glucose (POC) 212 (H) 65 - 117 mg/dL    Performed by Mattie Roth    CBC WITH AUTOMATED DIFF    Collection Time: 06/06/22  5:05 AM   Result Value Ref Range    WBC 9.2 4.1 - 11.1 K/uL    RBC 5.15 4.10 - 5.70 M/uL    HGB 15.4 12.1 - 17.0 g/dL    HCT 43.6 36.6 - 50.3 %    MCV 84.7 80.0 - 99.0 FL    MCH 29.9 26.0 - 34.0 PG    MCHC 35.3 30.0 - 36.5 g/dL    RDW 13.4 11.5 - 14.5 %    PLATELET 089 455 - 417 K/uL    MPV 9.8 8.9 - 12.9 FL    NRBC 0.0 0  WBC    ABSOLUTE NRBC 0.00 0.00 - 0.01 K/uL    NEUTROPHILS 66 32 - 75 %    LYMPHOCYTES 16 12 - 49 %    MONOCYTES 14 (H) 5 - 13 % EOSINOPHILS 2 0 - 7 %    BASOPHILS 1 0 - 1 %    IMMATURE GRANULOCYTES 1 (H) 0.0 - 0.5 %    ABS. NEUTROPHILS 6.2 1.8 - 8.0 K/UL    ABS. LYMPHOCYTES 1.4 0.8 - 3.5 K/UL    ABS. MONOCYTES 1.3 (H) 0.0 - 1.0 K/UL    ABS. EOSINOPHILS 0.2 0.0 - 0.4 K/UL    ABS. BASOPHILS 0.1 0.0 - 0.1 K/UL    ABS. IMM. GRANS. 0.1 (H) 0.00 - 0.04 K/UL    DF AUTOMATED     METABOLIC PANEL, BASIC    Collection Time: 06/06/22  5:05 AM   Result Value Ref Range    Sodium 135 (L) 136 - 145 mmol/L    Potassium 4.2 3.5 - 5.1 mmol/L    Chloride 101 97 - 108 mmol/L    CO2 30 21 - 32 mmol/L    Anion gap 4 (L) 5 - 15 mmol/L    Glucose 211 (H) 65 - 100 mg/dL    BUN 27 (H) 6 - 20 MG/DL    Creatinine 1.36 (H) 0.70 - 1.30 MG/DL    BUN/Creatinine ratio 20 12 - 20      GFR est AA >60 >60 ml/min/1.73m2    GFR est non-AA 52 (L) >60 ml/min/1.73m2    Calcium 9.5 8.5 - 10.1 MG/DL   GLUCOSE, POC    Collection Time: 06/06/22  7:30 AM   Result Value Ref Range    Glucose (POC) 226 (H) 65 - 117 mg/dL    Performed by Lilia Ojeda            Assessment:     Principal Problem:    Colonic polyp (6/2/2022)    Active Problems:    Type 2 diabetes mellitus with hyperglycemia, without long-term current use of insulin ([address] (6/4/2022)      Thrombocytopenia (H[facility]) (6/4/2022)        4 Days Post-Op s/p hand-assisted laparoscopic right hemicolectomy, ileocolostonmy, and primary repair of umbilical hernia.     Hemodynamically stable. Hemoglobin stable. Thrombocytopenia resolved.     Urine output not documented. Creatinine mildly elevated. The patient probably got behind on his fluids yesterday secondary to the nausea and vomiting.     GI function is beginning to return, but the patient is not yet fit for discharge. Plan:   Continue low fiber diabetic diet and Ensure Surgery. Continue fingerstick glucose and sliding scale insulin.     Continue to hold Lovenox secondary to recent thrombocytopenia.     Physical therapy. Ambulate. Incentive spirometer.

## 2022-06-06 NOTE — PROGRESS NOTES
RUR: 6% Low    ESTELITA: Anticipated discharge home. Patient's wife will provide transport home once medically stable. Follow-up with PCP/specialist.      Primary Contact: Wife, Liam Barraza, [phone]    9:08AM - CM reviewed chart. Per chart review, patient is POD#4 for hand-assisted laparoscopic right hemicolectomy, ileocolostomy, and primary repair of umbilical hernia. Awaiting bowel function return. PT has signed off; no HH PT needs. Discharge pending medical progress and final recommendations. CM will continue to follow as needed.     Wilber Saeed, CASI   [phone]

## 2022-06-07 ENCOUNTER — APPOINTMENT (OUTPATIENT)
Dept: CT IMAGING | Age: 72
DRG: 330 | End: 2022-06-07
Attending: COLON & RECTAL SURGERY
Payer: MEDICARE

## 2022-06-07 LAB
ANION GAP SERPL CALC-SCNC: 12 MMOL/L (ref 5–15)
BASOPHILS # BLD: 0.1 K/UL (ref 0–0.1)
BASOPHILS NFR BLD: 1 % (ref 0–1)
BUN SERPL-MCNC: 29 MG/DL (ref 6–20)
BUN/CREAT SERPL: 21 (ref 12–20)
CALCIUM SERPL-MCNC: 9 MG/DL (ref 8.5–10.1)
CHLORIDE SERPL-SCNC: 97 MMOL/L (ref 97–108)
CO2 SERPL-SCNC: 23 MMOL/L (ref 21–32)
CREAT SERPL-MCNC: 1.41 MG/DL (ref 0.7–1.3)
DIFFERENTIAL METHOD BLD: ABNORMAL
EOSINOPHIL # BLD: 0.2 K/UL (ref 0–0.4)
EOSINOPHIL NFR BLD: 1 % (ref 0–7)
ERYTHROCYTE [DISTWIDTH] IN BLOOD BY AUTOMATED COUNT: 13.7 % (ref 11.5–14.5)
GLUCOSE BLD STRIP.AUTO-MCNC: 183 MG/DL (ref 65–117)
GLUCOSE BLD STRIP.AUTO-MCNC: 187 MG/DL (ref 65–117)
GLUCOSE BLD STRIP.AUTO-MCNC: 193 MG/DL (ref 65–117)
GLUCOSE BLD STRIP.AUTO-MCNC: 206 MG/DL (ref 65–117)
GLUCOSE SERPL-MCNC: 174 MG/DL (ref 65–100)
HCT VFR BLD AUTO: 48.5 % (ref 36.6–50.3)
HGB BLD-MCNC: 17 G/DL (ref 12.1–17)
IMM GRANULOCYTES # BLD AUTO: 0.1 K/UL (ref 0–0.04)
IMM GRANULOCYTES NFR BLD AUTO: 0 % (ref 0–0.5)
LYMPHOCYTES # BLD: 1.9 K/UL (ref 0.8–3.5)
LYMPHOCYTES NFR BLD: 16 % (ref 12–49)
MCH RBC QN AUTO: 30.1 PG (ref 26–34)
MCHC RBC AUTO-ENTMCNC: 35.1 G/DL (ref 30–36.5)
MCV RBC AUTO: 86 FL (ref 80–99)
MONOCYTES # BLD: 1.7 K/UL (ref 0–1)
MONOCYTES NFR BLD: 15 % (ref 5–13)
NEUTS SEG # BLD: 8 K/UL (ref 1.8–8)
NEUTS SEG NFR BLD: 67 % (ref 32–75)
NRBC # BLD: 0 K/UL (ref 0–0.01)
NRBC BLD-RTO: 0 PER 100 WBC
PHOSPHATE SERPL-MCNC: 3.7 MG/DL (ref 2.6–4.7)
PLATELET # BLD AUTO: 244 K/UL (ref 150–400)
PMV BLD AUTO: 9.6 FL (ref 8.9–12.9)
POTASSIUM SERPL-SCNC: 3.8 MMOL/L (ref 3.5–5.1)
RBC # BLD AUTO: 5.64 M/UL (ref 4.1–5.7)
SERVICE CMNT-IMP: ABNORMAL
SODIUM SERPL-SCNC: 132 MMOL/L (ref 136–145)
WBC # BLD AUTO: 11.8 K/UL (ref 4.1–11.1)

## 2022-06-07 PROCEDURE — 74011250637 HC RX REV CODE- 250/637: Performed by: COLON & RECTAL SURGERY

## 2022-06-07 PROCEDURE — 74011000636 HC RX REV CODE- 636: Performed by: COLON & RECTAL SURGERY

## 2022-06-07 PROCEDURE — 65270000032 HC RM SEMIPRIVATE

## 2022-06-07 PROCEDURE — 80048 BASIC METABOLIC PNL TOTAL CA: CPT

## 2022-06-07 PROCEDURE — 74011000636 HC RX REV CODE- 636

## 2022-06-07 PROCEDURE — 85025 COMPLETE CBC W/AUTO DIFF WBC: CPT

## 2022-06-07 PROCEDURE — 74011250636 HC RX REV CODE- 250/636: Performed by: COLON & RECTAL SURGERY

## 2022-06-07 PROCEDURE — 82962 GLUCOSE BLOOD TEST: CPT

## 2022-06-07 PROCEDURE — 74011000250 HC RX REV CODE- 250: Performed by: COLON & RECTAL SURGERY

## 2022-06-07 PROCEDURE — 74177 CT ABD & PELVIS W/CONTRAST: CPT

## 2022-06-07 PROCEDURE — 36415 COLL VENOUS BLD VENIPUNCTURE: CPT

## 2022-06-07 PROCEDURE — 84100 ASSAY OF PHOSPHORUS: CPT

## 2022-06-07 PROCEDURE — 74011636637 HC RX REV CODE- 636/637: Performed by: COLON & RECTAL SURGERY

## 2022-06-07 RX ORDER — SODIUM CHLORIDE 9 MG/ML
100 INJECTION, SOLUTION INTRAVENOUS CONTINUOUS
Status: DISCONTINUED | OUTPATIENT
Start: 2022-06-07 | End: 2022-06-08 | Stop reason: HOSPADM

## 2022-06-07 RX ORDER — FAMOTIDINE 20 MG/1
20 TABLET, FILM COATED ORAL 2 TIMES DAILY
Status: DISCONTINUED | OUTPATIENT
Start: 2022-06-07 | End: 2022-06-08 | Stop reason: HOSPADM

## 2022-06-07 RX ADMIN — Medication 2 UNITS: at 08:08

## 2022-06-07 RX ADMIN — SODIUM CHLORIDE 100 ML/HR: 9 INJECTION, SOLUTION INTRAVENOUS at 09:41

## 2022-06-07 RX ADMIN — IOHEXOL 50 ML: 240 INJECTION, SOLUTION INTRATHECAL; INTRAVASCULAR; INTRAVENOUS; ORAL at 10:00

## 2022-06-07 RX ADMIN — GABAPENTIN 300 MG: 300 CAPSULE ORAL at 21:15

## 2022-06-07 RX ADMIN — AMLODIPINE BESYLATE 10 MG: 5 TABLET ORAL at 21:15

## 2022-06-07 RX ADMIN — METOPROLOL SUCCINATE 100 MG: 50 TABLET, EXTENDED RELEASE ORAL at 09:27

## 2022-06-07 RX ADMIN — SODIUM CHLORIDE 100 ML/HR: 9 INJECTION, SOLUTION INTRAVENOUS at 16:42

## 2022-06-07 RX ADMIN — ONDANSETRON 4 MG: 4 TABLET, ORALLY DISINTEGRATING ORAL at 06:11

## 2022-06-07 RX ADMIN — ONDANSETRON HYDROCHLORIDE 4 MG: 2 SOLUTION INTRAMUSCULAR; INTRAVENOUS at 00:36

## 2022-06-07 RX ADMIN — SODIUM CHLORIDE, PRESERVATIVE FREE 10 ML: 5 INJECTION INTRAVENOUS at 14:44

## 2022-06-07 RX ADMIN — Medication 3 UNITS: at 12:08

## 2022-06-07 RX ADMIN — Medication 2 UNITS: at 16:45

## 2022-06-07 RX ADMIN — FAMOTIDINE 20 MG: 20 TABLET ORAL at 15:09

## 2022-06-07 RX ADMIN — CETIRIZINE HYDROCHLORIDE 10 MG: 10 TABLET, FILM COATED ORAL at 09:27

## 2022-06-07 RX ADMIN — LEVOTHYROXINE SODIUM 175 MCG: 0.15 TABLET ORAL at 06:11

## 2022-06-07 RX ADMIN — IOPAMIDOL 100 ML: 755 INJECTION, SOLUTION INTRAVENOUS at 11:47

## 2022-06-07 RX ADMIN — ACETAMINOPHEN 1000 MG: 500 TABLET ORAL at 06:06

## 2022-06-07 RX ADMIN — SODIUM CHLORIDE, PRESERVATIVE FREE 10 ML: 5 INJECTION INTRAVENOUS at 22:00

## 2022-06-07 RX ADMIN — SODIUM CHLORIDE 500 ML: 9 INJECTION, SOLUTION INTRAVENOUS at 09:40

## 2022-06-07 NOTE — PROGRESS NOTES
Bedside shift change report given to LIBERTY Camara (oncoming nurse) by Aura Kapoor RN (offgoing nurse). Report included the following information SBAR, Kardex, ED Summary, Intake/Output, MAR, Accordion, Recent Results and Med Rec Status.

## 2022-06-07 NOTE — PROGRESS NOTES
General Daily Progress Note    Admission Date: 6/2/2022  Hospital Day 6  Post-Op Day 5  Subjective:   Wants to go home. Had one episode of small volume, reportedly bilious emesis during the night. Numerous loose (but not watery) stools. Wife doesn't think that he is drinking enough fluids. Objective:   Patient Vitals for the past 24 hrs:   BP Temp Pulse Resp SpO2   06/07/22 0731 (!) 145/84 97.8 °F (36.6 °C) 90 18 96 %   06/06/22 2353 (!) 153/80 97.8 °F (36.6 °C) 69 18 93 %   06/06/22 2031 135/88 98 °F (36.7 °C) 69 18 97 %   06/06/22 1613 (!) 148/76 97.8 °F (36.6 °C) 82 18 94 %   06/06/22 1126 130/76 98 °F (36.7 °C) 78 18 94 %   06/06/22 0947 (!) 152/74 -- 77 -- --     No intake/output data recorded. 06/05 1901 - 06/07 0700  In: 120 [P.O.:120]  Out: 201 [Urine:200]      Physical Examination:  General Appearance - No distress. Sitting in chair in street clothes with shoes on. Abdomen - Distension still significant, but soft and appropriately tender. Incisions clean, dry, intact, and with ecchymosis but no erythema.              Data Review   Recent Results (from the past 24 hour(s))   GLUCOSE, POC    Collection Time: 06/06/22 11:04 AM   Result Value Ref Range    Glucose (POC) 183 (H) 65 - 117 mg/dL    Performed by [employer]  PCT    GLUCOSE, POC    Collection Time: 06/06/22  5:17 PM   Result Value Ref Range    Glucose (POC) 208 (H) 65 - 117 mg/dL    Performed by [employer]  PCT    GLUCOSE, POC    Collection Time: 06/06/22  9:46 PM   Result Value Ref Range    Glucose (POC) 159 (H) 65 - 117 mg/dL    Performed by HONG Stanford    CBC WITH AUTOMATED DIFF    Collection Time: 06/07/22  5:51 AM   Result Value Ref Range    WBC 11.8 (H) 4.1 - 11.1 K/uL    RBC 5.64 4.10 - 5.70 M/uL    HGB 17.0 12.1 - 17.0 g/dL    HCT 48.5 36.6 - 50.3 %    MCV 86.0 80.0 - 99.0 FL    MCH 30.1 26.0 - 34.0 PG    MCHC 35.1 30.0 - 36.5 g/dL    RDW 13.7 11.5 - 14.5 %    PLATELET 556 241 - 497 K/uL    MPV 9.6 8.9 - 12.9 FL    NRBC 0.0 0  WBC    ABSOLUTE NRBC 0.00 0.00 - 0.01 K/uL    NEUTROPHILS 67 32 - 75 %    LYMPHOCYTES 16 12 - 49 %    MONOCYTES 15 (H) 5 - 13 %    EOSINOPHILS 1 0 - 7 %    BASOPHILS 1 0 - 1 %    IMMATURE GRANULOCYTES 0 0.0 - 0.5 %    ABS. NEUTROPHILS 8.0 1.8 - 8.0 K/UL    ABS. LYMPHOCYTES 1.9 0.8 - 3.5 K/UL    ABS. MONOCYTES 1.7 (H) 0.0 - 1.0 K/UL    ABS. EOSINOPHILS 0.2 0.0 - 0.4 K/UL    ABS. BASOPHILS 0.1 0.0 - 0.1 K/UL    ABS. IMM. GRANS. 0.1 (H) 0.00 - 0.04 K/UL    DF AUTOMATED     METABOLIC PANEL, BASIC    Collection Time: 06/07/22  5:51 AM   Result Value Ref Range    Sodium 132 (L) 136 - 145 mmol/L    Potassium 3.8 3.5 - 5.1 mmol/L    Chloride 97 97 - 108 mmol/L    CO2 23 21 - 32 mmol/L    Anion gap 12 5 - 15 mmol/L    Glucose 174 (H) 65 - 100 mg/dL    BUN 29 (H) 6 - 20 MG/DL    Creatinine 1.41 (H) 0.70 - 1.30 MG/DL    BUN/Creatinine ratio 21 (H) 12 - 20      GFR est AA >60 >60 ml/min/1.73m2    GFR est non-AA 50 (L) >60 ml/min/1.73m2    Calcium 9.0 8.5 - 10.1 MG/DL   PHOSPHORUS    Collection Time: 06/07/22  5:51 AM   Result Value Ref Range    Phosphorus 3.7 2.6 - 4.7 MG/DL   GLUCOSE, POC    Collection Time: 06/07/22  6:29 AM   Result Value Ref Range    Glucose (POC) 193 (H) 65 - 117 mg/dL    Performed by Seymour Drake            Assessment:     Principal Problem:    Colonic polyp (6/2/2022)    Active Problems:    Type 2 diabetes mellitus with hyperglycemia, without long-term current use of insulin ([address] (6/4/2022)      Thrombocytopenia (HCC) (6/4/2022)        5 Days Post-Op s/p hand-assisted laparoscopic right hemicolectomy, ileocolostonmy, and primary repair of umbilical hernia.     Hemodynamically stable. Hemoglobin increased consistent with hypovolemia. Urine output not fully documented. Creatinine increased form yesterday.     GI function is returning, but patient does not mack to be keeping up with the fluid losses.     New leukocytosis is concerning for possible intraabdominal source. Plan:   NS bolus, then resume maintenance fluid. CT scan of abdomen and pelvis with oral and IV contrast to evaluate for evidence of abscess or anastomotic leak. Continue to hold Lovenox.     Physical therapy. Ambulate. Incentive spirometer. Possible discharge this evening if CT ok and no more vomiting.

## 2022-06-08 VITALS
TEMPERATURE: 98 F | HEIGHT: 70 IN | RESPIRATION RATE: 20 BRPM | DIASTOLIC BLOOD PRESSURE: 62 MMHG | BODY MASS INDEX: 30.93 KG/M2 | OXYGEN SATURATION: 94 % | HEART RATE: 77 BPM | SYSTOLIC BLOOD PRESSURE: 122 MMHG | WEIGHT: 216.05 LBS

## 2022-06-08 LAB
ANION GAP SERPL CALC-SCNC: 8 MMOL/L (ref 5–15)
BASOPHILS # BLD: 0.1 K/UL (ref 0–0.1)
BASOPHILS NFR BLD: 1 % (ref 0–1)
BUN SERPL-MCNC: 27 MG/DL (ref 6–20)
BUN/CREAT SERPL: 24 (ref 12–20)
CALCIUM SERPL-MCNC: 8.1 MG/DL (ref 8.5–10.1)
CHLORIDE SERPL-SCNC: 104 MMOL/L (ref 97–108)
CO2 SERPL-SCNC: 23 MMOL/L (ref 21–32)
CREAT SERPL-MCNC: 1.14 MG/DL (ref 0.7–1.3)
DIFFERENTIAL METHOD BLD: ABNORMAL
EOSINOPHIL # BLD: 0.1 K/UL (ref 0–0.4)
EOSINOPHIL NFR BLD: 1 % (ref 0–7)
ERYTHROCYTE [DISTWIDTH] IN BLOOD BY AUTOMATED COUNT: 14.1 % (ref 11.5–14.5)
GLUCOSE BLD STRIP.AUTO-MCNC: 181 MG/DL (ref 65–117)
GLUCOSE BLD STRIP.AUTO-MCNC: 185 MG/DL (ref 65–117)
GLUCOSE SERPL-MCNC: 174 MG/DL (ref 65–100)
HCT VFR BLD AUTO: 43 % (ref 36.6–50.3)
HGB BLD-MCNC: 15.1 G/DL (ref 12.1–17)
IMM GRANULOCYTES # BLD AUTO: 0 K/UL (ref 0–0.04)
IMM GRANULOCYTES NFR BLD AUTO: 0 % (ref 0–0.5)
LYMPHOCYTES # BLD: 1.1 K/UL (ref 0.8–3.5)
LYMPHOCYTES NFR BLD: 15 % (ref 12–49)
MCH RBC QN AUTO: 30.3 PG (ref 26–34)
MCHC RBC AUTO-ENTMCNC: 35.1 G/DL (ref 30–36.5)
MCV RBC AUTO: 86.3 FL (ref 80–99)
MONOCYTES # BLD: 1.6 K/UL (ref 0–1)
MONOCYTES NFR BLD: 22 % (ref 5–13)
NEUTS SEG # BLD: 4.4 K/UL (ref 1.8–8)
NEUTS SEG NFR BLD: 61 % (ref 32–75)
NRBC # BLD: 0 K/UL (ref 0–0.01)
NRBC BLD-RTO: 0 PER 100 WBC
PLATELET # BLD AUTO: 215 K/UL (ref 150–400)
PMV BLD AUTO: 9.9 FL (ref 8.9–12.9)
POTASSIUM SERPL-SCNC: 3.8 MMOL/L (ref 3.5–5.1)
RBC # BLD AUTO: 4.98 M/UL (ref 4.1–5.7)
RBC MORPH BLD: ABNORMAL
SERVICE CMNT-IMP: ABNORMAL
SERVICE CMNT-IMP: ABNORMAL
SODIUM SERPL-SCNC: 135 MMOL/L (ref 136–145)
WBC # BLD AUTO: 7.3 K/UL (ref 4.1–11.1)

## 2022-06-08 PROCEDURE — 74011636637 HC RX REV CODE- 636/637: Performed by: COLON & RECTAL SURGERY

## 2022-06-08 PROCEDURE — 74011250637 HC RX REV CODE- 250/637: Performed by: COLON & RECTAL SURGERY

## 2022-06-08 PROCEDURE — 85025 COMPLETE CBC W/AUTO DIFF WBC: CPT

## 2022-06-08 PROCEDURE — 36415 COLL VENOUS BLD VENIPUNCTURE: CPT

## 2022-06-08 PROCEDURE — 82962 GLUCOSE BLOOD TEST: CPT

## 2022-06-08 PROCEDURE — 80048 BASIC METABOLIC PNL TOTAL CA: CPT

## 2022-06-08 RX ORDER — ONDANSETRON 4 MG/1
4 TABLET, ORALLY DISINTEGRATING ORAL
Qty: 10 TABLET | Refills: 0 | Status: SHIPPED | OUTPATIENT
Start: 2022-06-08

## 2022-06-08 RX ORDER — GABAPENTIN 300 MG/1
300 CAPSULE ORAL
COMMUNITY

## 2022-06-08 RX ORDER — ACETAMINOPHEN 500 MG
1000 TABLET ORAL EVERY 6 HOURS
Qty: 100 TABLET | Refills: 0 | Status: SHIPPED
Start: 2022-06-08

## 2022-06-08 RX ADMIN — Medication 2 UNITS: at 12:19

## 2022-06-08 RX ADMIN — METOPROLOL SUCCINATE 100 MG: 50 TABLET, EXTENDED RELEASE ORAL at 10:20

## 2022-06-08 RX ADMIN — NALOXEGOL OXALATE 25 MG: 12.5 TABLET, FILM COATED ORAL at 10:20

## 2022-06-08 RX ADMIN — Medication 2 UNITS: at 08:05

## 2022-06-08 NOTE — DISCHARGE INSTRUCTIONS
Discharge Instructions for [facility] Patients       1. Do not lift any objects weighing more than 10 pounds for 4 weeks after the surgery date. 2. Do not do any housework including vacuuming, scrubbing or yardwork for 4 weeks after the surgery date. 3. Do not drive for 2 weeks after the surgery date. 4. You should walk frequently and you should go up and down stairs as desired. 5. You may shower, but do not submerge your abdomen. Do not take tub baths, swim, or use hot tubs for the next 4 weeks. 6. You have surgical glue on your incisions. It will dissolve over time. Do not scrub around the incisions or attempt to remove the glue. 7. Continue the low fiber diet for the next 2 weeks. (See handbook for additional details). 8. Drink nutritional supplements 2 times per day until your diet and appetite are back to normal. If you are diabetic, drink one-half bottle 4 times per day. 9. It can be normal to have multiple bowel movements each day. Contact your surgeons office for any concerns. 10. Take maximum doses of Tylenol (1000 mg every 6 hours) until you do not need pain medication any more. 11. Follow up:  Please return to Dr. Calista Jacobsen office at 9:30 AM on Thursday 6/16/2022. 12. If you experience fever (temperature greater than 100.5), chills, vomiting, redness around an incision, or infected-looking drainage from an incision, please contact your surgeons office. 13. Please see handbook for additional instructions. If you have further questions, please call your surgeons office at [phone].

## 2022-06-08 NOTE — DISCHARGE SUMMARY
Discharge Summary     Patient ID:    Yessenia Lundberg  [id number]  32 y.o.  1950    Admission Date: 6/2/2022    Discharge Date: 6/8/2022    Admission Diagnoses:  1. Colonic polyp  2. Peripheral neuropathy    Chronic Diagnoses:    Patient Active Problem List   Diagnosis Code    Bilateral groin pain R10.31, R10.32    Colonic polyp K63.5    Type 2 diabetes mellitus with hyperglycemia, without long-term current use of insulin ([facility] [address] E11.65    Thrombocytopenia ([facility] D69.6       Discharge Diagnoses:  1. Tubular adenoma of the cecum (resected)  2. Incarcerated umbilical hernia without obstruction (reduced and repaired)  3. Peripheral neuropathy  4. Thrombocytopenia (resolved)  5. Hypophosphatemia (corrected)        Hospital Problems as of 6/8/2022 Date Reviewed: 3/8/2022          Codes Class Noted - Resolved POA    Type 2 diabetes mellitus with hyperglycemia, without long-term current use of insulin (HCC) (Chronic) ICD-10-CM: E11.65  ICD-9-CM: 250.00, 790.29  6/4/2022 - Present Yes        Thrombocytopenia (HCC) ICD-10-CM: D69.6  ICD-9-CM: 287.5  6/4/2022 - Present No        * (Principal) Colonic polyp (Chronic) ICD-10-CM: C61.7  ICD-9-CM: 211.3  6/2/2022 - Present Yes              Procedures Performed:  1. Cystoscopy and placement of bilateral ureteral catheters was performed by Andrade Gar MD on 6/2/2022.  2. Hand-assisted laparoscopic right hemicolectomy, ileocolostomy, and primary repair of umbilical hernia was performed by Dr. Sri Tellez on 6/2/2022. Discharge Medications:   Current Discharge Medication List      START taking these medications    Details   acetaminophen (TYLENOL) 500 mg tablet Take 2 Tablets by mouth every six (6) hours. Qty: 100 Tablet, Refills: 0      ondansetron (ZOFRAN ODT) 4 mg disintegrating tablet Take 1 Tablet by mouth every six (6) hours as needed for Nausea.   Qty: 10 Tablet, Refills: 0         CONTINUE these medications which have NOT CHANGED    Details gabapentin (NEURONTIN) 300 mg capsule Take 300 mg by mouth nightly as needed (Neuorpathic pain). nebivoloL (Bystolic) 10 mg tablet Take 10 mg by mouth daily. amLODIPine (NORVASC) 10 mg tablet Take 10 mg by mouth nightly. SYNTHROID 175 mcg tablet Take 175 mcg by mouth daily. Takes at 2am.      pantoprazole (PROTONIX) 40 mg tablet Take 40 mg by mouth daily. CETIRIZINE HCL (ZYRTEC PO) Take 10 mg by mouth daily as needed. ergocalciferol (Vitamin D2) 1,250 mcg (50,000 unit) capsule Take 50,000 Units by mouth every seven (7) days. ON TUESDAY         STOP taking these medications       ASPIRIN (ASPIR-81 PO) Comments: Hold until seen  For follow-up  Reason for Stopping:   Bleeding risk             Diet:  Low fiber diet for two weeks. Activity:  No strenuous activity or driving until two weeks after the operation. No lifting more than 10 lb. until four weeks after the operation. Frequent walking and stair climbing are encouraged. Wound Care:  None. Discharge Condition:  Improved compared to that upon admission. Follow-up Care:  1. Dr. Zamzam Yan on 6/16/2022  2. Cornelio Pickett MD in 1-2 weeks      Significant Diagnostic Studies:   Recent Labs     06/08/22  0647 06/07/22  0551   WBC 7.3 11.8*   HGB 15.1 17.0   HCT 43.0 48.5    244     Recent Labs     06/08/22  0647 06/07/22  0551 06/06/22  0505   * 132* 135*   K 3.8 3.8 4.2    97 101   CO2 23 23 30   BUN 27* 29* 27*   CREA 1.14 1.41* 1.36*   * 174* 211*   CA 8.1* 9.0 9.5   PHOS  --  3.7  --        Lab Results   Component Value Date/Time    Glucose (POC) 181 (H) 06/08/2022 12:04 PM    Glucose (POC) 185 (H) 06/08/2022 06:36 AM    Glucose (POC) 183 (H) 06/07/2022 09:18 PM    Glucose (POC) 187 (H) 06/07/2022 04:06 PM    Glucose (POC) 206 (H) 06/07/2022 11:05 AM         HOSPITAL COURSE & TREATMENT RENDERED:    The patient is an asymptomatic 80-year-old male who underwent a colonoscopy on 10/06/2021.   That procedure was remarkable for sigmoid diverticulosis, two sessile ascending colon polyps, and three sessile cecal polyps. One of the cecal polyps was approximately 2.5 cm in greatest dimension, very flat, and located on the ileocecal valve. All of the polyps were removed, but the ileocecal valve polyp required a piecemeal technique. The pathology report indicated that all of the tissue fragments were consistent with tubular adenoma. A planned follow-up colonoscopy was then performed on 04/06/2022 and, during that procedure, a residual large flat polyp was again noted on the ileocecal valve. An attempt to lift it to permit complete excision was not successful. Biopsies were obtained, and the specimens were reportedly normal colonic mucosa. Nevertheless, Dr. Delphina Klinefelter remained highly suspicious that there was residual neoplastic tissue that could not be removed with the colonoscope. The patient was therefore referred for surgical consultation. The rationale for a colon resection following oncologic principles was discussed with the patient in great detail. He appeared to have understood the risks, including risk that there would be no residual neoplastic disease in the specimen, and he agreed to proceed. He was taken to the operating room on 6/2/2022, and there the above-listed procedures were performed without apparent complications. Post-operatively, he was transported in stable condition from the recovery room to the floor. His hospital course was essentially unremarkable except for some episodes of emesis and then a mild but unexpected leukocytosis on POD#5. A CT scan of the abdomen and pelvis was obtained on that day, and it revealed no evidence of an anastomotic leak or other complication. He was treaed with parenteral fluids and on the following day the white blood cell count was normal.    He experienced a gradual return of bowel function, and his diet was advanced appropriately.  On POD#6, he was hemodynamically stable and afebrile, tolerating a low fiber diet, passing flatus and stool, and ambulating well. There was no physical or laboratory evidence of infection or other complication, and at this point he was judged to be fit for discharge. His condition upon discharge was improved compared to that upon admission, and he appeared to have understood all discharge and follow-up instructions.         Signed:  Monica Crooks MD

## 2022-06-08 NOTE — PROGRESS NOTES
Discharge orders received,  Discharge paperwork printed. RN told of rx at [facility],  Appointment w Dr Arleen Lorenzo on 6/16,  rn went over post surgical discharge instruction page. All questions by patient and wife were answered. IV removed.   Volunteer ordered for discharge wheelchair ride to front

## 2022-06-08 NOTE — PROGRESS NOTES
General Daily Progress Note    Admission Date: 6/2/2022  Hospital Day 7  Post-Op Day 6  Subjective:   Feeling less bloated. No nausea. Had a large loose bowel movement along with a lot of flatus. Feels well enough to go home. Objective:     Patient Vitals for the past 24 hrs:   BP Temp Pulse Resp SpO2 Weight   06/08/22 0715 122/62 98 °F (36.7 °C) 77 20 94 % --   06/08/22 0655 -- -- -- -- -- 98 kg (216 lb 0.8 oz)   06/07/22 2338 122/63 97.5 °F (36.4 °C) 67 18 93 % --   06/07/22 1952 138/74 97.8 °F (36.6 °C) 67 18 94 % --   06/07/22 1523 (!) 143/79 98.1 °F (36.7 °C) (!) 59 18 94 % --     No intake/output data recorded. 06/06 1901 - 06/08 0700  In: 751.7 [P.O.:120; I.V.:631.7]  Out: 726 [Urine:725]      Physical Examination:  General Appearance - No distress. Abdomen - Less distended. Soft. Appropriately trender. Incisions clean, dry, intact, and with ecchymosis but no erythema. Extremities - No calf tenderness.             Data Review   Recent Results (from the past 24 hour(s))   GLUCOSE, POC    Collection Time: 06/07/22  4:06 PM   Result Value Ref Range    Glucose (POC) 187 (H) 65 - 117 mg/dL    Performed by [address], POC    Collection Time: 06/07/22  9:18 PM   Result Value Ref Range    Glucose (POC) 183 (H) 65 - 117 mg/dL    Performed by Lars Alfredo    GLUCOSE, POC    Collection Time: 06/08/22  6:36 AM   Result Value Ref Range    Glucose (POC) 185 (H) 65 - 117 mg/dL    Performed by Lars Alfredo    METABOLIC PANEL, BASIC    Collection Time: 06/08/22  6:47 AM   Result Value Ref Range    Sodium 135 (L) 136 - 145 mmol/L    Potassium 3.8 3.5 - 5.1 mmol/L    Chloride 104 97 - 108 mmol/L    CO2 23 21 - 32 mmol/L    Anion gap 8 5 - 15 mmol/L    Glucose 174 (H) 65 - 100 mg/dL    BUN 27 (H) 6 - 20 MG/DL    Creatinine 1.14 0.70 - 1.30 MG/DL    BUN/Creatinine ratio 24 (H) 12 - 20      GFR est AA >60 >60 ml/min/1.73m2    GFR est non-AA >60 >60 ml/min/1.73m2    Calcium 8.1 (L) 8.5 - 10.1 MG/DL   CBC WITH AUTOMATED DIFF    Collection Time: 06/08/22  6:47 AM   Result Value Ref Range    WBC 7.3 4.1 - 11.1 K/uL    RBC 4.98 4.10 - 5.70 M/uL    HGB 15.1 12.1 - 17.0 g/dL    HCT 43.0 36.6 - 50.3 %    MCV 86.3 80.0 - 99.0 FL    MCH 30.3 26.0 - 34.0 PG    MCHC 35.1 30.0 - 36.5 g/dL    RDW 14.1 11.5 - 14.5 %    PLATELET 130 729 - 886 K/uL    MPV 9.9 8.9 - 12.9 FL    NRBC 0.0 0  WBC    ABSOLUTE NRBC 0.00 0.00 - 0.01 K/uL    NEUTROPHILS 61 32 - 75 %    LYMPHOCYTES 15 12 - 49 %    MONOCYTES 22 (H) 5 - 13 %    EOSINOPHILS 1 0 - 7 %    BASOPHILS 1 0 - 1 %    IMMATURE GRANULOCYTES 0 0.0 - 0.5 %    ABS. NEUTROPHILS 4.4 1.8 - 8.0 K/UL    ABS. LYMPHOCYTES 1.1 0.8 - 3.5 K/UL    ABS. MONOCYTES 1.6 (H) 0.0 - 1.0 K/UL    ABS. EOSINOPHILS 0.1 0.0 - 0.4 K/UL    ABS. BASOPHILS 0.1 0.0 - 0.1 K/UL    ABS. IMM. GRANS. 0.0 0.00 - 0.04 K/UL    DF SMEAR SCANNED      RBC COMMENTS NORMOCYTIC, NORMOCHROMIC         CT scan of Abdomen and Pelvis (6/7/2022:   No abscess or evidence of anastomoic leak. Bowel pattern most consistent with ileus. Assessment:     Principal Problem:    Colonic polyp (6/2/2022)    Active Problems:    Type 2 diabetes mellitus with hyperglycemia, without long-term current use of insulin ([address] (6/4/2022)      Thrombocytopenia ([address] (6/4/2022)        6 Days Post-Op s/p hand-assisted laparoscopic right hemicolectomy, ileocolostonmy, and primary repair of umbilical hernia.     Hemodynamically stable. Hemoglobin back to where it ought to be after IV hydration.     Urine output not fully documented. Creatinine back within normal limits after IV hydration.     GI function is improving. The leukocytosis has resolved. No evidence of infection or other complication. We discussed the pathology results on 6/6/2022. The polyp was benign. The patient appears to be fit for discharge to home. Plan:   Discharge to home after lunch if no setback by then. Follow up with me on 6/16/2022.

## 2022-06-08 NOTE — PROGRESS NOTES
RUR: 7% Low    ESTELITA: Anticipated discharge home. Patient's wife will provide transport home once medically stable. Follow-up with PCP/specialist.      Primary Contact: Wife, Abimbola Mckeon, [phone]    Medicare pt has received, reviewed, and signed 2nd IM letter informing them of their right to appeal the discharge. Signed copied has been placed on pt bedside chart.     11:57AM - CM reviewed chart. Per chart review, patient is POD#6 for hand-assisted laparoscopic right hemicolectomy, ileocolostomy, and primary repair of umbilical hernia. Per attending, plan for CT scan of abdomen and pelvis with oral and IV contrast. Discharge possibly this evening pending medical progress. 2:00PM - CM noted discharge order. Patient medically stable for discharge. No HH needs. Patient's wife at bedside and will transport home. No further CM needs.      CASI Perez   [phone]

## 2022-10-17 ENCOUNTER — OFFICE VISIT (OUTPATIENT)
Dept: ORTHOPEDIC SURGERY | Age: 72
End: 2022-10-17
Payer: MEDICARE

## 2022-10-17 VITALS — WEIGHT: 199 LBS | BODY MASS INDEX: 28.49 KG/M2 | HEIGHT: 70 IN

## 2022-10-17 DIAGNOSIS — S61.210A LACERATION OF RIGHT INDEX FINGER W/O FOREIGN BODY W/O DAMAGE TO NAIL, INITIAL ENCOUNTER: ICD-10-CM

## 2022-10-17 DIAGNOSIS — M79.641 RIGHT HAND PAIN: Primary | ICD-10-CM

## 2022-10-17 PROCEDURE — G8536 NO DOC ELDER MAL SCRN: HCPCS | Performed by: ORTHOPAEDIC SURGERY

## 2022-10-17 PROCEDURE — 99213 OFFICE O/P EST LOW 20 MIN: CPT | Performed by: ORTHOPAEDIC SURGERY

## 2022-10-17 PROCEDURE — 3017F COLORECTAL CA SCREEN DOC REV: CPT | Performed by: ORTHOPAEDIC SURGERY

## 2022-10-17 PROCEDURE — G8417 CALC BMI ABV UP PARAM F/U: HCPCS | Performed by: ORTHOPAEDIC SURGERY

## 2022-10-17 PROCEDURE — 1123F ACP DISCUSS/DSCN MKR DOCD: CPT | Performed by: ORTHOPAEDIC SURGERY

## 2022-10-17 PROCEDURE — 1101F PT FALLS ASSESS-DOCD LE1/YR: CPT | Performed by: ORTHOPAEDIC SURGERY

## 2022-10-17 PROCEDURE — G8427 DOCREV CUR MEDS BY ELIG CLIN: HCPCS | Performed by: ORTHOPAEDIC SURGERY

## 2022-10-17 PROCEDURE — G8432 DEP SCR NOT DOC, RNG: HCPCS | Performed by: ORTHOPAEDIC SURGERY

## 2022-10-17 RX ORDER — SULFAMETHOXAZOLE AND TRIMETHOPRIM 400; 80 MG/1; MG/1
1 TABLET ORAL 2 TIMES DAILY
Qty: 14 TABLET | Refills: 0 | Status: SHIPPED | OUTPATIENT
Start: 2022-10-17 | End: 2022-10-24

## 2022-10-17 NOTE — PROGRESS NOTES
HPI: Shreya Lane (: 1950) is a 71 y.o. male, patient, here for evaluation of the following chief complaint(s):    Finger Pain (Right index finger PIP joint scraped with string from crossbow on 10/15/22. Right hand dominant retired gentleman.)  Patient is seen today to evaluate his hands. He has had prior bilateral thumb CMC arthroplasties. He initially had undergone a left thumb CMC arthroplasty then later had a right thumb CMC arthroplasty including Arthrex mini tight rope suspension plasty approximately 2 to 3 years ago. Overall he recovered well. Patient was hunting with a crossbow when the crossbow string cut across the dorsal PIP region of his right index finger on 10/15/2022. He was treated at [facility].  Patient described the injury going down to bone but he has not lost any extension. He has a distal based flap over the PIP joint with 3 sutures in place. There is no redness drainage or sign of infection and he has full resting extension even versus resistance. Vitals:  Ht 5' 10\" (1.778 m)   Wt 199 lb (90.3 kg)   BMI 28.55 kg/m²    Body mass index is 28.55 kg/m². Allergies   Allergen Reactions    Augmentin [Amoxicillin-Pot Clavulanate] Other (comments)     \"Sick\"    Ciprofloxacin Nausea Only    Codeine Nausea and Vomiting    Sulfa (Sulfonamide Antibiotics) Other (comments)     \"MAKES ME FEEL WEIRD\"       Current Outpatient Medications   Medication Sig    trimethoprim-sulfamethoxazole (BACTRIM, SEPTRA)  mg per tablet Take 1 Tablet by mouth two (2) times a day for 7 days. gabapentin (NEURONTIN) 300 mg capsule Take 300 mg by mouth nightly as needed (Neuorpathic pain). acetaminophen (TYLENOL) 500 mg tablet Take 2 Tablets by mouth every six (6) hours.     ondansetron (ZOFRAN ODT) 4 mg disintegrating tablet Take 1 Tablet by mouth every six (6) hours as needed for Nausea.    ergocalciferol (Vitamin D2) 1,250 mcg (50,000 unit) capsule Take 50,000 Units by mouth every seven (7) days. ON TUESDAY    nebivoloL (Bystolic) 10 mg tablet Take 10 mg by mouth daily. amLODIPine (NORVASC) 10 mg tablet Take 10 mg by mouth nightly. SYNTHROID 175 mcg tablet Take 175 mcg by mouth daily. Takes at 2am.    pantoprazole (PROTONIX) 40 mg tablet Take 40 mg by mouth daily. CETIRIZINE HCL (ZYRTEC PO) Take 10 mg by mouth daily as needed. No current facility-administered medications for this visit. Past Medical History:   Diagnosis Date    Arrhythmia 2006    ATRIAL FIBRILLATION - when thyroid \"went out\" - no problem since thyroid is under control    Arthritis     Bilateral groin pain 4/19/2016    COVID-19 vaccine series completed     Pfizer dose #1 received on 4/8/2021; dose #2 received on 4/30/2021    GERD (gastroesophageal reflux disease)     Hiatal hernia     History of prostate cancer     Hypertension     Ill-defined condition     KIDNEY STONE    Joint pain     Multiple stiff joints     Muscle ache     Obesity (BMI 30.0-34. 9)     Sinus problem     Thyroid disease     radiation tablet    Type II diabetes mellitus (HCC)     Hgb A1C 7.5 on 5/18/2022        Past Surgical History:   Procedure Laterality Date    COLONOSCOPY N/A 10/4/2016    Jerson Juarez MD    COLONOSCOPY N/A 10/6/2021    Onur Perez MD    COLONOSCOPY N/A 4/6/2022    Onur Perez MD    HX APPENDECTOMY  age 11    HX CATARACT REMOVAL      BILATERAL CATARACT REMOVAL    HX GI      ESOPHAGEAL DILITATION (SEVERAL)    HX HERNIA REPAIR Left age 10    Left inguinal hernia repair.     HX ORTHOPAEDIC      left wrist (bone taken out of thumb and a tendon transplanted) and left shoulder - bone spur removed, right knee - meniscus removed and right shoulder x 2- bone spurs x2 and removed part of collar bone    HX ORTHOPAEDIC      GILL THUMB    HX ORTHOPAEDIC      RIGHT WRIST    HX UROLOGICAL  age 9    Circumcision       Family History   Problem Relation Age of Onset    Cancer Mother         breast    Heart Disease Mother     Heart Failure Mother     Pacemaker Mother     Cancer Father         prostate x 4    Diabetes Brother     Cancer Brother         prostate    Heart Disease Brother     Heart Attack Brother     Diabetes Brother     Cancer Daughter         cervical cancer    Diabetes Sister     No Known Problems Sister     Anesth Problems Neg Hx         Social History     Tobacco Use    Smoking status: Never    Smokeless tobacco: Never   Substance Use Topics    Alcohol use: No     Alcohol/week: 0.0 standard drinks    Drug use: No        Review of Systems   All other systems reviewed and are negative. Physical Exam    There are well-healed scars of the dorsal base of each thumb CMC joint. No digital clicking or locking. Good sensation refill to fingers. Right index finger splint and dressings removed and there is a 1 and half centimeter curved distal based laceration with 3 nylon sutures in place centered over the PIP region. There is full resting extension even full extension versus resistance and no pain. No PIP or DIP lag. Imaging:    Side radiographs from an [facility] on 10/15/2022 and reportedly revealed no evidence of a fracture involving the index PIP region. ASSESSMENT/PLAN:  Below is the assessment and plan developed based on review of pertinent history, physical exam, labs, studies, and medications. Patient has undergone staged bilateral thumb CMC arthroplasties and overall is doing very well. He occasionally has some discomfort in the right side. This had undergone a CMC arthroplasty with suspension plasty including mini tight rope technique 2 to 3 years ago. Overall he recovered well. Patient had an injury to his right index finger dorsally creating a laceration from a crossbow string on 10/15/2022. This may have been just a simple laceration as he has full resting extension although I would be more concerned for a high-grade partial near complete extensor mechanism tear.   However, he has no pain during resisted digital extension testing and for now I recommended continued dressing changes and expected recovery with return in 10 to 14 days for wound assessment suture removal sooner if needed. 1. Right hand pain  -     trimethoprim-sulfamethoxazole (BACTRIM, SEPTRA)  mg per tablet; Take 1 Tablet by mouth two (2) times a day for 7 days. , Normal, Disp-14 Tablet, R-0  2. Laceration of right index finger w/o foreign body w/o damage to nail, initial encounter      Return in about 2 weeks (around 10/31/2022). An electronic signature was used to authenticate this note.   -- Ebony Blakely MD

## 2022-10-31 ENCOUNTER — OFFICE VISIT (OUTPATIENT)
Dept: ORTHOPEDIC SURGERY | Age: 72
End: 2022-10-31
Payer: MEDICARE

## 2022-10-31 DIAGNOSIS — M79.641 RIGHT HAND PAIN: ICD-10-CM

## 2022-10-31 DIAGNOSIS — S61.210D LACERATION OF RIGHT INDEX FINGER WITHOUT FOREIGN BODY WITHOUT DAMAGE TO NAIL, SUBSEQUENT ENCOUNTER: Primary | ICD-10-CM

## 2022-10-31 DIAGNOSIS — M19.042 PRIMARY OSTEOARTHRITIS OF BOTH HANDS: ICD-10-CM

## 2022-10-31 DIAGNOSIS — M19.041 PRIMARY OSTEOARTHRITIS OF BOTH HANDS: ICD-10-CM

## 2022-10-31 DIAGNOSIS — M18.0 PRIMARY OSTEOARTHRITIS OF BOTH FIRST CARPOMETACARPAL JOINTS: ICD-10-CM

## 2022-10-31 PROCEDURE — G8536 NO DOC ELDER MAL SCRN: HCPCS | Performed by: ORTHOPAEDIC SURGERY

## 2022-10-31 PROCEDURE — G8427 DOCREV CUR MEDS BY ELIG CLIN: HCPCS | Performed by: ORTHOPAEDIC SURGERY

## 2022-10-31 PROCEDURE — 1101F PT FALLS ASSESS-DOCD LE1/YR: CPT | Performed by: ORTHOPAEDIC SURGERY

## 2022-10-31 PROCEDURE — 1123F ACP DISCUSS/DSCN MKR DOCD: CPT | Performed by: ORTHOPAEDIC SURGERY

## 2022-10-31 PROCEDURE — G8432 DEP SCR NOT DOC, RNG: HCPCS | Performed by: ORTHOPAEDIC SURGERY

## 2022-10-31 PROCEDURE — 99212 OFFICE O/P EST SF 10 MIN: CPT | Performed by: ORTHOPAEDIC SURGERY

## 2022-10-31 PROCEDURE — G8417 CALC BMI ABV UP PARAM F/U: HCPCS | Performed by: ORTHOPAEDIC SURGERY

## 2022-10-31 PROCEDURE — 3017F COLORECTAL CA SCREEN DOC REV: CPT | Performed by: ORTHOPAEDIC SURGERY

## 2022-10-31 NOTE — LETTER
10/31/2022    Patient: Tru Nash   YOB: 1950   Date of Visit: 10/31/2022     Lance Osman MD  [address]  [address] [address]  Via Fax: [phone]    Dear Lance Osman MD,      Thank you for referring Mr. Kaleb Dominguez to [facility] for evaluation. My notes for this consultation are attached. If you have questions, please do not hesitate to call me. I look forward to following your patient along with you.       Sincerely,    Ramya Randle MD

## 2022-10-31 NOTE — PROGRESS NOTES
HPI: Katja Rondon (: 1950) is a 71 y.o. male, patient, here for evaluation of the following chief complaint(s):    No chief complaint on file. Patient is seen today to evaluate his hands. He has had prior bilateral thumb CMC arthroplasties. He initially had undergone a left thumb CMC arthroplasty then later had a right thumb CMC arthroplasty including Arthrex mini tight rope suspension plasty approximately 2 to 3 years ago. Overall he recovered well. Patient was hunting with a crossbow when the crossbow string cut across the dorsal PIP region of his right index finger on 10/15/2022. He was [facility].  Patient described the injury going down to bone but he has not lost any extension. He has a distal based flap over the PIP joint with 3 sutures in place. There is no redness drainage or sign of infection and he has full resting extension even versus resistance. He return to undergo suture removal for the right index finger on 10/31/2022. Vitals: There were no vitals taken for this visit. There is no height or weight on file to calculate BMI. Allergies   Allergen Reactions    Augmentin [Amoxicillin-Pot Clavulanate] Other (comments)     \"Sick\"    Ciprofloxacin Nausea Only    Codeine Nausea and Vomiting    Sulfa (Sulfonamide Antibiotics) Other (comments)     \"MAKES ME FEEL WEIRD\"       Current Outpatient Medications   Medication Sig    gabapentin (NEURONTIN) 300 mg capsule Take 300 mg by mouth nightly as needed (Neuorpathic pain). acetaminophen (TYLENOL) 500 mg tablet Take 2 Tablets by mouth every six (6) hours. ondansetron (ZOFRAN ODT) 4 mg disintegrating tablet Take 1 Tablet by mouth every six (6) hours as needed for Nausea.    ergocalciferol (Vitamin D2) 1,250 mcg (50,000 unit) capsule Take 50,000 Units by mouth every seven (7) days. ON TUESDAY    nebivoloL (Bystolic) 10 mg tablet Take 10 mg by mouth daily.     amLODIPine (NORVASC) 10 mg tablet Take 10 mg by mouth nightly. SYNTHROID 175 mcg tablet Take 175 mcg by mouth daily. Takes at 2am.    pantoprazole (PROTONIX) 40 mg tablet Take 40 mg by mouth daily. CETIRIZINE HCL (ZYRTEC PO) Take 10 mg by mouth daily as needed. No current facility-administered medications for this visit. Past Medical History:   Diagnosis Date    Arrhythmia 2006    ATRIAL FIBRILLATION - when thyroid \"went out\" - no problem since thyroid is under control    Arthritis     Bilateral groin pain 4/19/2016    COVID-19 vaccine series completed     Pfizer dose #1 received on 4/8/2021; dose #2 received on 4/30/2021    GERD (gastroesophageal reflux disease)     Hiatal hernia     History of prostate cancer     Hypertension     Ill-defined condition     KIDNEY STONE    Joint pain     Multiple stiff joints     Muscle ache     Obesity (BMI 30.0-34. 9)     Sinus problem     Thyroid disease     radiation tablet    Type II diabetes mellitus (HCC)     Hgb A1C 7.5 on 5/18/2022        Past Surgical History:   Procedure Laterality Date    COLONOSCOPY N/A 10/4/2016    Alee Mueller MD    COLONOSCOPY N/A 10/6/2021    Yeny Allison MD    COLONOSCOPY N/A 4/6/2022    Yeny Allison MD    HX APPENDECTOMY  age 11    HX CATARACT REMOVAL      BILATERAL CATARACT REMOVAL    HX GI      ESOPHAGEAL DILITATION (SEVERAL)    HX HERNIA REPAIR Left age 10    Left inguinal hernia repair.     HX ORTHOPAEDIC      left wrist (bone taken out of thumb and a tendon transplanted) and left shoulder - bone spur removed, right knee - meniscus removed and right shoulder x 2- bone spurs x2 and removed part of collar bone    HX ORTHOPAEDIC      GILL THUMB    HX ORTHOPAEDIC      RIGHT WRIST    HX UROLOGICAL  age 9    Circumcision       Family History   Problem Relation Age of Onset    Cancer Mother         breast    Heart Disease Mother     Heart Failure Mother     Pacemaker Mother     Cancer Father         prostate x 4    Diabetes Brother     Cancer Brother         prostate    Heart Disease Brother     Heart Attack Brother     Diabetes Brother     Cancer Daughter         cervical cancer    Diabetes Sister     No Known Problems Sister     Anesth Problems Neg Hx         Social History     Tobacco Use    Smoking status: Never    Smokeless tobacco: Never   Substance Use Topics    Alcohol use: No     Alcohol/week: 0.0 standard drinks    Drug use: No        Review of Systems   All other systems reviewed and are negative. Physical Exam    There are well-healed scars of the dorsal base of each thumb CMC joint. No digital clicking or locking. Good sensation refill to fingers. Right index finger splint and dressings removed and there is a 1 and half centimeter curved distal based laceration with 3 nylon sutures in place centered over the PIP region. There is full resting extension even full extension versus resistance and no pain. No PIP or DIP lag. Imaging:    Side radiographs from an [facility] on 10/15/2022 and reportedly revealed no evidence of a fracture involving the index PIP region. ASSESSMENT/PLAN:  Below is the assessment and plan developed based on review of pertinent history, physical exam, labs, studies, and medications. Patient has undergone staged bilateral thumb CMC arthroplasties and overall is doing very well. He occasionally has some discomfort in the right side. This had undergone a CMC arthroplasty with suspension plasty including mini tight rope technique 2 to 3 years ago. Overall he recovered well. Patient had an injury to his right index finger dorsally creating a laceration from a crossbow string on 10/15/2022. This may have been just a simple laceration as he has full resting extension although I would be more concerned for a high-grade partial near complete extensor mechanism tear.   However, he has no pain during resisted digital extension testing and for now I recommended continued dressing changes and expected recovery with return in 10 to 14 days for wound assessment suture removal sooner if needed. He return to reevaluate on 10/31/2022. He had full extension and is working on flexion recovery. Sutures removed and there is no sign of infection. Steri-Strips and gauze as well as Coban wrap applied. He will slowly work with flexion recovery maintaining his extension and currently plans to return in 3 weeks likely for a final visit. 1. Laceration of right index finger without foreign body without damage to nail, subsequent encounter  2. Right hand pain  3. Primary osteoarthritis of both hands  4. Primary osteoarthritis of both first carpometacarpal joints      Return Return in 3 to 4 weeks or as needed. An electronic signature was used to authenticate this note.   -- Erick Rader MD

## 2022-11-17 NOTE — PROGRESS NOTES
HPI: Aditi Ivan (: 1950) is a 71 y.o. male, patient, here for evaluation of the following chief complaint(s):    No chief complaint on file. Patient is seen today to evaluate his hands. He has had prior bilateral thumb CMC arthroplasties. He initially had undergone a left thumb CMC arthroplasty then later had a right thumb CMC arthroplasty including Arthrex mini tight rope suspension plasty approximately 2 to 3 years ago. Overall he recovered well. Patient was hunting with a crossbow when the crossbow string cut across the dorsal PIP region of his right index finger on 10/15/2022. He was treated at [facility].  Patient described the injury going down to bone but he has not lost any extension. He has a distal based flap over the PIP joint with 3 sutures in place. There is no redness drainage or sign of infection and he has full resting extension even versus resistance. He returned to undergo suture removal for the right index finger on 10/31/2022. Vitals: There were no vitals taken for this visit. There is no height or weight on file to calculate BMI. Allergies   Allergen Reactions    Augmentin [Amoxicillin-Pot Clavulanate] Other (comments)     \"Sick\"    Ciprofloxacin Nausea Only    Codeine Nausea and Vomiting    Sulfa (Sulfonamide Antibiotics) Other (comments)     \"MAKES ME FEEL WEIRD\"       Current Outpatient Medications   Medication Sig    gabapentin (NEURONTIN) 300 mg capsule Take 300 mg by mouth nightly as needed (Neuorpathic pain). acetaminophen (TYLENOL) 500 mg tablet Take 2 Tablets by mouth every six (6) hours. ondansetron (ZOFRAN ODT) 4 mg disintegrating tablet Take 1 Tablet by mouth every six (6) hours as needed for Nausea.    ergocalciferol (Vitamin D2) 1,250 mcg (50,000 unit) capsule Take 50,000 Units by mouth every seven (7) days. ON TUESDAY    nebivoloL (Bystolic) 10 mg tablet Take 10 mg by mouth daily.     amLODIPine (NORVASC) 10 mg tablet Take 10 mg by mouth nightly. SYNTHROID 175 mcg tablet Take 175 mcg by mouth daily. Takes at 2am.    pantoprazole (PROTONIX) 40 mg tablet Take 40 mg by mouth daily. CETIRIZINE HCL (ZYRTEC PO) Take 10 mg by mouth daily as needed. No current facility-administered medications for this visit. Past Medical History:   Diagnosis Date    Arrhythmia 2006    ATRIAL FIBRILLATION - when thyroid \"went out\" - no problem since thyroid is under control    Arthritis     Bilateral groin pain 4/19/2016    COVID-19 vaccine series completed     Pfizer dose #1 received on 4/8/2021; dose #2 received on 4/30/2021    GERD (gastroesophageal reflux disease)     Hiatal hernia     History of prostate cancer     Hypertension     Ill-defined condition     KIDNEY STONE    Joint pain     Multiple stiff joints     Muscle ache     Obesity (BMI 30.0-34. 9)     Sinus problem     Thyroid disease     radiation tablet    Type II diabetes mellitus (HCC)     Hgb A1C 7.5 on 5/18/2022        Past Surgical History:   Procedure Laterality Date    COLONOSCOPY N/A 10/4/2016    Markus Antonio MD    COLONOSCOPY N/A 10/6/2021    Diego Elizabeth MD    COLONOSCOPY N/A 4/6/2022    Diego Elizabeth MD    HX APPENDECTOMY  age 11    HX CATARACT REMOVAL      BILATERAL CATARACT REMOVAL    HX GI      ESOPHAGEAL DILITATION (SEVERAL)    HX HERNIA REPAIR Left age 10    Left inguinal hernia repair.     HX ORTHOPAEDIC      left wrist (bone taken out of thumb and a tendon transplanted) and left shoulder - bone spur removed, right knee - meniscus removed and right shoulder x 2- bone spurs x2 and removed part of collar bone    HX ORTHOPAEDIC      GILL THUMB    HX ORTHOPAEDIC      RIGHT WRIST    HX UROLOGICAL  age 9    Circumcision       Family History   Problem Relation Age of Onset    Cancer Mother         breast    Heart Disease Mother     Heart Failure Mother     Pacemaker Mother     Cancer Father         prostate x 4    Diabetes Brother     Cancer Brother         prostate    Heart Disease Brother     Heart Attack Brother     Diabetes Brother     Cancer Daughter         cervical cancer    Diabetes Sister     No Known Problems Sister     Anesth Problems Neg Hx         Social History     Tobacco Use    Smoking status: Never    Smokeless tobacco: Never   Substance Use Topics    Alcohol use: No     Alcohol/week: 0.0 standard drinks    Drug use: No        Review of Systems   All other systems reviewed and are negative. Physical Exam    There are well-healed scars of the dorsal base of each thumb CMC joint. No digital clicking or locking. Good sensation refill to fingers. Index finger curved laceration over the proximal phalanx level towards the PIP region is healed. He remains with 0 degrees of extension and 90 degrees of flexion with full extension strength versus resistance. Wound otherwise healed. Imaging: Outside radiographs from [facility] on 10/15/2022 and reportedly revealed no evidence of a fracture involving the index PIP region. ASSESSMENT/PLAN:  Below is the assessment and plan developed based on review of pertinent history, physical exam, labs, studies, and medications. Patient has undergone staged bilateral thumb CMC arthroplasties and overall is doing very well. He occasionally has some discomfort in the right side. This had undergone a CMC arthroplasty with suspension plasty including mini tight rope technique 2 to 3 years ago. Overall he recovered well. Patient had an injury to his right index finger dorsally creating a laceration from a crossbow string on 10/15/2022. This may have been just a simple laceration as he has full resting extension although I would be more concerned for a high-grade partial near complete extensor mechanism tear.   However, he has no pain during resisted digital extension testing and for now I recommended continued dressing changes and expected recovery with return in 10 to 14 days for wound assessment suture removal sooner if needed. He return to reevaluate on 10/31/2022. He had full extension and flexion of 90 degrees. He may continue with motion and strength to his tolerance. No weakness or pain produced with resisted extension testing and clinically the extensor mechanism remains intact of the likely there had been a partial injury. Nonetheless, he is doing well and is pleased with his recovery and will continue with motion and strengthening exercises. He has had some occasional discomfort where he has had a prior left thumb CMC arthroplasty but overall seems to be doing well. He was seen with his wife and questions answered. He may return in 4 to 6 weeks or anytime for further treatment. 1. Laceration of right index finger without foreign body without damage to nail, subsequent encounter  2. Primary osteoarthritis of both hands  3. Primary osteoarthritis of both first carpometacarpal joints  4. Right hand pain      No follow-ups on file. An electronic signature was used to authenticate this note.   -- Ramya Randle MD

## 2022-11-21 ENCOUNTER — OFFICE VISIT (OUTPATIENT)
Dept: ORTHOPEDIC SURGERY | Age: 72
End: 2022-11-21
Payer: MEDICARE

## 2022-11-21 VITALS — WEIGHT: 205 LBS | HEIGHT: 70 IN | BODY MASS INDEX: 29.35 KG/M2

## 2022-11-21 DIAGNOSIS — M19.041 PRIMARY OSTEOARTHRITIS OF BOTH HANDS: ICD-10-CM

## 2022-11-21 DIAGNOSIS — M18.0 PRIMARY OSTEOARTHRITIS OF BOTH FIRST CARPOMETACARPAL JOINTS: ICD-10-CM

## 2022-11-21 DIAGNOSIS — S61.210D LACERATION OF RIGHT INDEX FINGER WITHOUT FOREIGN BODY WITHOUT DAMAGE TO NAIL, SUBSEQUENT ENCOUNTER: Primary | ICD-10-CM

## 2022-11-21 DIAGNOSIS — M19.042 PRIMARY OSTEOARTHRITIS OF BOTH HANDS: ICD-10-CM

## 2022-11-21 DIAGNOSIS — M79.641 RIGHT HAND PAIN: ICD-10-CM

## 2022-11-21 PROCEDURE — 99212 OFFICE O/P EST SF 10 MIN: CPT | Performed by: ORTHOPAEDIC SURGERY

## 2022-11-21 PROCEDURE — G8427 DOCREV CUR MEDS BY ELIG CLIN: HCPCS | Performed by: ORTHOPAEDIC SURGERY

## 2022-11-21 PROCEDURE — G8417 CALC BMI ABV UP PARAM F/U: HCPCS | Performed by: ORTHOPAEDIC SURGERY

## 2022-11-21 PROCEDURE — 3017F COLORECTAL CA SCREEN DOC REV: CPT | Performed by: ORTHOPAEDIC SURGERY

## 2022-11-21 PROCEDURE — G8432 DEP SCR NOT DOC, RNG: HCPCS | Performed by: ORTHOPAEDIC SURGERY

## 2022-11-21 PROCEDURE — 1123F ACP DISCUSS/DSCN MKR DOCD: CPT | Performed by: ORTHOPAEDIC SURGERY

## 2022-11-21 PROCEDURE — 1101F PT FALLS ASSESS-DOCD LE1/YR: CPT | Performed by: ORTHOPAEDIC SURGERY

## 2022-11-21 PROCEDURE — G8536 NO DOC ELDER MAL SCRN: HCPCS | Performed by: ORTHOPAEDIC SURGERY

## 2022-11-21 NOTE — LETTER
11/21/2022    Patient: Donavan Schroeder   YOB: 1950   Date of Visit: 11/21/2022     Clarita Jane MD  [address]  [address] [address]  Via Fax: [phone]    Dear Clarita Jane MD,      Thank you for referring Mr. Jayesh Taveras to [facility] for evaluation. My notes for this consultation are attached. If you have questions, please do not hesitate to call me. I look forward to following your patient along with you.       Sincerely,    Ivana Briones MD

## 2023-02-10 NOTE — PROGRESS NOTES
HPI: Rosalio Palumbo (: 1950) is a 71 y.o. male, patient, here for evaluation of the following chief complaint(s):    Finger Pain (Crossbow string cut across right index finger dorsal PIP joint on 10/15/22. Sutures removed on 10/31/22. Has been working on ROM, strength. C/o pain on right index finger radial aspect at proximal phalanx level. ) and Thumb Pain (Left thumb pain, decreased  strength. Left hand dominant retired gentleman.)  Patient is seen today to evaluate his hands. He has had prior bilateral thumb CMC arthroplasties. He initially had undergone a left thumb CMC arthroplasty then later had a right thumb CMC arthroplasty including Arthrex mini tight rope suspension plasty approximately 2 to 3 years ago. Overall he recovered well. He has had some occasional discomfort even when he was long splitting and tossing the firewood into a pile. Patient was hunting with a crossbow when the crossbow string cut across the dorsal PIP region of his right index finger on 10/15/2022. He was treated at [facility].  Patient described the injury going down to bone but he has not lost any extension. He has a distal based flap over the PIP joint with 3 sutures in place. There is no redness drainage or sign of infection and he has full resting extension even versus resistance. He returned to undergo suture removal for the right index finger on 10/31/2022. Overall he recovered well but occasionally has had some discomfort in the right index finger. Vitals:  Ht 5' 10\" (1.778 m)   Wt 205 lb (93 kg)   BMI 29.41 kg/m²    Body mass index is 29.41 kg/m².     Allergies   Allergen Reactions    Augmentin [Amoxicillin-Pot Clavulanate] Other (comments)     \"Sick\"    Ciprofloxacin Nausea Only    Codeine Nausea and Vomiting    Sulfa (Sulfonamide Antibiotics) Other (comments)     \"MAKES ME FEEL WEIRD\"       Current Outpatient Medications   Medication Sig    methylPREDNISolone (MEDROL DOSEPACK) 4 mg tablet Per dose pack instructions    gabapentin (NEURONTIN) 300 mg capsule Take 300 mg by mouth nightly as needed (Neuorpathic pain). acetaminophen (TYLENOL) 500 mg tablet Take 2 Tablets by mouth every six (6) hours. ondansetron (ZOFRAN ODT) 4 mg disintegrating tablet Take 1 Tablet by mouth every six (6) hours as needed for Nausea.    ergocalciferol (Vitamin D2) 1,250 mcg (50,000 unit) capsule Take 50,000 Units by mouth every seven (7) days. ON TUESDAY    nebivoloL (Bystolic) 10 mg tablet Take 10 mg by mouth daily. amLODIPine (NORVASC) 10 mg tablet Take 10 mg by mouth nightly. SYNTHROID 175 mcg tablet Take 175 mcg by mouth daily. Takes at 2am.    pantoprazole (PROTONIX) 40 mg tablet Take 40 mg by mouth daily. CETIRIZINE HCL (ZYRTEC PO) Take 10 mg by mouth daily as needed. No current facility-administered medications for this visit. Past Medical History:   Diagnosis Date    Arrhythmia 2006    ATRIAL FIBRILLATION - when thyroid \"went out\" - no problem since thyroid is under control    Arthritis     Bilateral groin pain 4/19/2016    COVID-19 vaccine series completed     Pfizer dose #1 received on 4/8/2021; dose #2 received on 4/30/2021    GERD (gastroesophageal reflux disease)     Hiatal hernia     History of prostate cancer     Hypertension     Ill-defined condition     KIDNEY STONE    Joint pain     Multiple stiff joints     Muscle ache     Obesity (BMI 30.0-34. 9)     Sinus problem     Thyroid disease     radiation tablet    Type II diabetes mellitus (HCC)     Hgb A1C 7.5 on 5/18/2022        Past Surgical History:   Procedure Laterality Date    COLONOSCOPY N/A 10/4/2016    Cecile Ackerman MD    COLONOSCOPY N/A 10/6/2021    Rosa Fonseca MD    COLONOSCOPY N/A 4/6/2022    Rosa Fonseca MD    HX APPENDECTOMY  age 11    HX CATARACT REMOVAL      BILATERAL CATARACT REMOVAL    HX GI      ESOPHAGEAL DILITATION (SEVERAL)    HX HERNIA REPAIR Left age 10    Left inguinal hernia repair.     HX ORTHOPAEDIC      left wrist (bone taken out of thumb and a tendon transplanted) and left shoulder - bone spur removed, right knee - meniscus removed and right shoulder x 2- bone spurs x2 and removed part of collar bone    HX ORTHOPAEDIC      GILL THUMB    HX ORTHOPAEDIC      RIGHT WRIST    HX UROLOGICAL  age 9    Circumcision       Family History   Problem Relation Age of Onset    Cancer Mother         breast    Heart Disease Mother     Heart Failure Mother     Pacemaker Mother     Cancer Father         prostate x 4    Diabetes Brother     Cancer Brother         prostate    Heart Disease Brother     Heart Attack Brother     Diabetes Brother     Cancer Daughter         cervical cancer    Diabetes Sister     No Known Problems Sister     Anesth Problems Neg Hx         Social History     Tobacco Use    Smoking status: Never    Smokeless tobacco: Never   Substance Use Topics    Alcohol use: No     Alcohol/week: 0.0 standard drinks    Drug use: No        Review of Systems   All other systems reviewed and are negative. Physical Exam    There are well-healed scars of the dorsal base of each thumb CMC joint. No digital clicking or locking. Good sensation refill to fingers. Index finger curved laceration over the proximal phalanx level towards the PIP region is healed. He remains with 0 degrees of extension and 90 degrees of flexion with full extension strength versus resistance. Wound otherwise healed. Imaging: Outside radiographs from [facility] on 10/15/2022 and reportedly revealed no evidence of a fracture involving the index PIP region. XR Results (most recent):  Results from Appointment encounter on 02/13/23    XR HAND LT MIN 3 V    Narrative  AP, lateral and oblique x-ray of the left hand shows proximal migration of a prior thumb CMC arthroplasty years ago. There is narrowing of the DIP joint space especially the ring finger.   There is peripheral vascular calcification of the radial artery at least to the index metacarpal base. There is a chronic appearing ossicle near the tip of the ulnar styloid. No acute fracture. ASSESSMENT/PLAN:  Below is the assessment and plan developed based on review of pertinent history, physical exam, labs, studies, and medications. Patient has undergone staged bilateral thumb CMC arthroplasties and overall is doing very well. He occasionally has some discomfort in the right side. This had undergone a CMC arthroplasty with suspension plasty including mini tight rope technique 2 to 3 years ago. Overall he recovered well. Patient had an injury to his right index finger dorsally creating a laceration from a crossbow string on 10/15/2022. This may have been just a simple laceration as he has full resting extension although I would be more concerned for a high-grade partial near complete extensor mechanism tear. However, he has no pain during resisted digital extension testing and for now I recommended continued dressing changes and expected recovery with return in 10 to 14 days for wound assessment suture removal sooner if needed. He return to reevaluate on 10/31/2022. He had full extension and flexion of 90 degrees. He may continue with motion and strength to his tolerance. No weakness or pain produced with resisted extension testing and clinically the extensor mechanism remains intact of the likely there had been a partial injury. Nonetheless, he is doing well and is pleased with his recovery and will continue with motion and strengthening exercises. He has had some occasional discomfort where he has had a prior left thumb CMC arthroplasty but overall seems to be doing well. He was seen with his wife and questions answered. He may have released an adhesion band to the index finger but now has full extension and flexion essentially and really no pain. He also aggravated his left thumb log splitting.   I explained that an option in the future could be further suspensioplasty of the thumb as there is some proximal migration but for now he wants to pursue a course of nonoperative care and may return anytime for further treatment. 1. Primary osteoarthritis of both hands  -     XR HAND LT MIN 3 V; Future  -     methylPREDNISolone (MEDROL DOSEPACK) 4 mg tablet; Per dose pack instructions, Normal, Disp-1 Dose Pack, R-0  2. Primary osteoarthritis of both first carpometacarpal joints  3. Laceration of right index finger without foreign body without damage to nail, subsequent encounter  4. Right hand pain      Return if symptoms worsen or fail to improve. An electronic signature was used to authenticate this note.   -- Nelson Sandifer, MD

## 2023-02-13 ENCOUNTER — OFFICE VISIT (OUTPATIENT)
Dept: ORTHOPEDIC SURGERY | Age: 73
End: 2023-02-13
Payer: MEDICARE

## 2023-02-13 VITALS — HEIGHT: 70 IN | BODY MASS INDEX: 29.35 KG/M2 | WEIGHT: 205 LBS

## 2023-02-13 DIAGNOSIS — M19.041 PRIMARY OSTEOARTHRITIS OF BOTH HANDS: Primary | ICD-10-CM

## 2023-02-13 DIAGNOSIS — M19.042 PRIMARY OSTEOARTHRITIS OF BOTH HANDS: Primary | ICD-10-CM

## 2023-02-13 DIAGNOSIS — M79.641 RIGHT HAND PAIN: ICD-10-CM

## 2023-02-13 DIAGNOSIS — S61.210D LACERATION OF RIGHT INDEX FINGER WITHOUT FOREIGN BODY WITHOUT DAMAGE TO NAIL, SUBSEQUENT ENCOUNTER: ICD-10-CM

## 2023-02-13 DIAGNOSIS — M18.0 PRIMARY OSTEOARTHRITIS OF BOTH FIRST CARPOMETACARPAL JOINTS: ICD-10-CM

## 2023-02-13 RX ORDER — METHYLPREDNISOLONE 4 MG/1
TABLET ORAL
Qty: 1 DOSE PACK | Refills: 0 | Status: SHIPPED | OUTPATIENT
Start: 2023-02-13

## 2023-02-13 NOTE — LETTER
2/13/2023    Patient: Rosalio Palumbo   YOB: 1950   Date of Visit: 2/13/2023     Benoit Menchaca MD  [address] [place] [address] Rd  [address] [address]  Via Fax: [phone]    Dear Benoit Menchaca MD,      Thank you for referring Mr. Jerry Navarrete to [facility] for evaluation. My notes for this consultation are attached. If you have questions, please do not hesitate to call me. I look forward to following your patient along with you.       Sincerely,    Rachel Ricketts MD

## 2023-06-07 ENCOUNTER — HOSPITAL ENCOUNTER (OUTPATIENT)
Facility: HOSPITAL | Age: 73
Setting detail: OUTPATIENT SURGERY
Discharge: HOME | End: 2023-06-07
Attending: INTERNAL MEDICINE | Admitting: INTERNAL MEDICINE
Payer: MEDICARE

## 2023-06-07 ENCOUNTER — ANESTHESIA EVENT (OUTPATIENT)
Facility: HOSPITAL | Age: 73
End: 2023-06-07
Payer: MEDICARE

## 2023-06-07 ENCOUNTER — ANESTHESIA (OUTPATIENT)
Facility: HOSPITAL | Age: 73
End: 2023-06-07
Payer: MEDICARE

## 2023-06-07 VITALS
OXYGEN SATURATION: 94 % | RESPIRATION RATE: 13 BRPM | DIASTOLIC BLOOD PRESSURE: 68 MMHG | BODY MASS INDEX: 28.35 KG/M2 | SYSTOLIC BLOOD PRESSURE: 113 MMHG | HEART RATE: 65 BPM | TEMPERATURE: 96.9 F | HEIGHT: 70 IN | WEIGHT: 198 LBS

## 2023-06-07 PROCEDURE — 7100000010 HC PHASE II RECOVERY - FIRST 15 MIN: Performed by: INTERNAL MEDICINE

## 2023-06-07 PROCEDURE — 3600007512: Performed by: INTERNAL MEDICINE

## 2023-06-07 PROCEDURE — 3700000000 HC ANESTHESIA ATTENDED CARE: Performed by: INTERNAL MEDICINE

## 2023-06-07 PROCEDURE — 2709999900 HC NON-CHARGEABLE SUPPLY: Performed by: INTERNAL MEDICINE

## 2023-06-07 PROCEDURE — 2720000010 HC SURG SUPPLY STERILE: Performed by: INTERNAL MEDICINE

## 2023-06-07 PROCEDURE — 3600007502: Performed by: INTERNAL MEDICINE

## 2023-06-07 PROCEDURE — 3700000001 HC ADD 15 MINUTES (ANESTHESIA): Performed by: INTERNAL MEDICINE

## 2023-06-07 PROCEDURE — C1769 GUIDE WIRE: HCPCS | Performed by: INTERNAL MEDICINE

## 2023-06-07 PROCEDURE — 88305 TISSUE EXAM BY PATHOLOGIST: CPT

## 2023-06-07 PROCEDURE — 2580000003 HC RX 258

## 2023-06-07 PROCEDURE — 2500000003 HC RX 250 WO HCPCS

## 2023-06-07 PROCEDURE — 6360000002 HC RX W HCPCS

## 2023-06-07 PROCEDURE — 7100000011 HC PHASE II RECOVERY - ADDTL 15 MIN: Performed by: INTERNAL MEDICINE

## 2023-06-07 RX ORDER — SODIUM CHLORIDE 0.9 % (FLUSH) 0.9 %
5-40 SYRINGE (ML) INJECTION EVERY 12 HOURS SCHEDULED
Status: DISCONTINUED | OUTPATIENT
Start: 2023-06-07 | End: 2023-06-07 | Stop reason: HOSPADM

## 2023-06-07 RX ORDER — SODIUM CHLORIDE 0.9 % (FLUSH) 0.9 %
5-40 SYRINGE (ML) INJECTION PRN
Status: DISCONTINUED | OUTPATIENT
Start: 2023-06-07 | End: 2023-06-07 | Stop reason: HOSPADM

## 2023-06-07 RX ORDER — WARFARIN SODIUM 5 MG/1
5 TABLET ORAL
COMMUNITY

## 2023-06-07 RX ORDER — SODIUM CHLORIDE 9 MG/ML
INJECTION, SOLUTION INTRAVENOUS CONTINUOUS
Status: DISCONTINUED | OUTPATIENT
Start: 2023-06-07 | End: 2023-06-07 | Stop reason: HOSPADM

## 2023-06-07 RX ORDER — SODIUM CHLORIDE 9 MG/ML
INJECTION, SOLUTION INTRAVENOUS CONTINUOUS PRN
Status: DISCONTINUED | OUTPATIENT
Start: 2023-06-07 | End: 2023-06-07 | Stop reason: SDUPTHER

## 2023-06-07 RX ORDER — SODIUM CHLORIDE 9 MG/ML
25 INJECTION, SOLUTION INTRAVENOUS PRN
Status: DISCONTINUED | OUTPATIENT
Start: 2023-06-07 | End: 2023-06-07 | Stop reason: HOSPADM

## 2023-06-07 RX ORDER — EPHEDRINE SULFATE 50 MG/ML
INJECTION INTRAVENOUS PRN
Status: DISCONTINUED | OUTPATIENT
Start: 2023-06-07 | End: 2023-06-07 | Stop reason: SDUPTHER

## 2023-06-07 RX ADMIN — PROPOFOL 30 MG: 10 INJECTION, EMULSION INTRAVENOUS at 08:18

## 2023-06-07 RX ADMIN — EPHEDRINE SULFATE 5 MG: 50 INJECTION INTRAVENOUS at 08:41

## 2023-06-07 RX ADMIN — PROPOFOL 50 MG: 10 INJECTION, EMULSION INTRAVENOUS at 08:14

## 2023-06-07 RX ADMIN — PROPOFOL 60 MG: 10 INJECTION, EMULSION INTRAVENOUS at 08:20

## 2023-06-07 RX ADMIN — SODIUM CHLORIDE: 9 INJECTION, SOLUTION INTRAVENOUS at 08:10

## 2023-06-07 RX ADMIN — PROPOFOL 50 MG: 10 INJECTION, EMULSION INTRAVENOUS at 08:22

## 2023-06-07 RX ADMIN — PROPOFOL 50 MG: 10 INJECTION, EMULSION INTRAVENOUS at 08:13

## 2023-06-07 RX ADMIN — EPHEDRINE SULFATE 5 MG: 50 INJECTION INTRAVENOUS at 08:32

## 2023-06-07 RX ADMIN — PROPOFOL 20 MG: 10 INJECTION, EMULSION INTRAVENOUS at 08:32

## 2023-06-07 RX ADMIN — PROPOFOL 30 MG: 10 INJECTION, EMULSION INTRAVENOUS at 08:27

## 2023-06-07 RX ADMIN — LIDOCAINE HYDROCHLORIDE 100 MG: 20 INJECTION, SOLUTION EPIDURAL; INFILTRATION; INTRACAUDAL; PERINEURAL at 08:12

## 2023-06-07 RX ADMIN — PROPOFOL 20 MG: 10 INJECTION, EMULSION INTRAVENOUS at 08:37

## 2023-06-07 RX ADMIN — PROPOFOL 30 MG: 10 INJECTION, EMULSION INTRAVENOUS at 08:16

## 2023-06-07 RX ADMIN — EPHEDRINE SULFATE 10 MG: 50 INJECTION INTRAVENOUS at 08:29

## 2023-06-07 ASSESSMENT — PAIN - FUNCTIONAL ASSESSMENT: PAIN_FUNCTIONAL_ASSESSMENT: NONE - DENIES PAIN

## 2023-06-07 NOTE — OP NOTE
polypectomy were performed using cold snare  and the polyps were  retrieved  Wired guided savary dilation of esophagus with 51 Fr to 47 Fr with mild proximal esophageal mucosa tear and self limited oozing. Specimens:   ID Type Source Tests Collected by Time Destination   A : Transverse colon polyps-3 Tissue Colon-Transverse SURGICAL PATHOLOGY Ashlee Miller MD 6/7/2023 6299                EBL: Minimal    Complications:  None; patient tolerated the procedure well. Impression:      -5 cm sliding hiatal hernia with moderate gastropathy  -No obvious esophageal narrowing - empiric dilation with 54 Fr (18 mm) savary dilator.     -Intact and healthy appearing ileocolonic anastomosis  -3 small polyps, 5-7 mm, found in the transverse colon, removed and sent for pathology  -Mild sigmoid colon diverticulosis  -Medium sized internal hemorrhoids - likely source of intermittent bleeding    Recommendations:  -Resume normal medications. Resume coumadin June 9th. -Acid suppression with omeprazole 20 mg once daily before breakfast  -A high fiber diet with plenty of fluids (up to 8 glasses of water daily) is suggested to relieve these symptoms. Metamucil, 1 tablespoon once or twice daily can be used to keep bowels regular if needed. -GERD diet: avoid fried and fatty foods. peppermint, chocolate, alcohol, coffee, citrus fruits and juices, tomoato products; avoid lying down for 2 to 3 hours after eating.  -Await pathology. -Repeat colonoscopy in 3 years  -Repeat upper endoscopy as needed for repeat dilation.   -Please schedule follow up in the office for hemorrhoid banding.      Ashlee Miller MD  6/7/2023  8:45 AM

## 2023-06-07 NOTE — PERIOP NOTE

## 2023-06-07 NOTE — ANESTHESIA POSTPROCEDURE EVALUATION
Post-Anesthesia Evaluation and Assessment    Patient: Anthony Lang MRN: [id number]  SSN: [id number]    YOB: 1950  Age: 71 y.o. Sex: male      I have evaluated the patient and they are stable and ready for discharge from the PACU. Cardiovascular Function/Vital Signs  Visit Vitals  /68   Pulse 65   Temp 96.9 °F (36.1 °C)   Resp 13   Ht 1.778 m (5' 10\")   Wt 89.8 kg (198 lb)   SpO2 94%   BMI 28.41 kg/m²       Patient is status post Monitor Anesthesia Care anesthesia for Procedure(s):  COLONOSCOPY DIAGNOSTIC  EGD DIAGNOSTIC ONLY  EGD DILATION SAVORY  COLONOSCOPY POLYPECTOMY SNARE/COLD BIOPSY. Nausea/Vomiting: None    Postoperative hydration reviewed and adequate. Pain:      Managed    Neurological Status: At baseline    Mental Status, Level of Consciousness: Alert and  oriented to person, place, and time    Pulmonary Status:       Adequate oxygenation and airway patent    Complications related to anesthesia: None    Post-anesthesia assessment completed.  No concerns    Signed By: Tabitha Verde MD     June 7, 2023            Department of Anesthesiology  Postprocedure Note    Patient: Anthony Lang  MRN: [id number]  YOB: 1950  Date of evaluation: 6/7/2023      Procedure Summary     Date: 06/07/23 Room / Location: [place] [address] / [facility] ENDOSCOPY    Anesthesia Start: 0810 Anesthesia Stop: 3012    Procedures:       COLONOSCOPY DIAGNOSTIC (Lower GI Region)      EGD DIAGNOSTIC ONLY (Upper GI Region)      EGD DILATION SAVORY (Upper GI Region)      COLONOSCOPY POLYPECTOMY SNARE/COLD BIOPSY (Lower GI Region) Diagnosis:       Colon cancer screening      Esophageal hiatal hernia      (Colon cancer screening [Z12.11])      (Esophageal hiatal hernia [K44.9])    Surgeons: Gabby Pantoja MD Responsible Provider: Chava Gomez MD    Anesthesia Type: MAC ASA Status: 2          Anesthesia Type: MAC    Teena Phase I: Teena Score: 10    Teena Phase II: Teena Score: 10      Anesthesia Post

## 2023-06-07 NOTE — ANESTHESIA PRE PROCEDURE
Department of Anesthesiology  Preprocedure Note       Name:  Bruce Martinez   Age:  71 y.o.  :  1950                                          MRN:  [id number]         Date:  2023      Surgeon: Berhane Mckeon):  Amaya Wynne MD    Procedure: Procedure(s):  COLONOSCOPY DIAGNOSTIC  EGD DIAGNOSTIC ONLY    Medications prior to admission:   Prior to Admission medications    Medication Sig Start Date End Date Taking? Authorizing Provider   acetaminophen (TYLENOL) 500 MG tablet Take 2 tablets by mouth in the morning and 2 tablets at noon and 2 tablets in the evening and 2 tablets before bedtime. 22   Ar Automatic Reconciliation   amLODIPine (NORVASC) 10 MG tablet Take 1 tablet by mouth 16   Ar Automatic Reconciliation   ergocalciferol (ERGOCALCIFEROL) 1.25 MG (42210 UT) capsule Take 50,000 Units by mouth every 7 days  Patient not taking: Reported on 2023    Ar Automatic Reconciliation   gabapentin (NEURONTIN) 300 MG capsule Take 300 mg by mouth.   Patient not taking: Reported on 2023    Ar Automatic Reconciliation   levothyroxine (SYNTHROID) 175 MCG tablet Take 1 tablet by mouth daily 16   Ar Automatic Reconciliation   methylPREDNISolone (MEDROL DOSEPACK) 4 MG tablet Per dose pack instructions  Patient not taking: Reported on 2023   Ar Automatic Reconciliation   nebivolol (BYSTOLIC) 10 MG tablet Take 1 tablet by mouth daily    Ar Automatic Reconciliation   ondansetron (ZOFRAN-ODT) 4 MG disintegrating tablet Take 1 tablet by mouth every 6 hours as needed 22   Ar Automatic Reconciliation   pantoprazole (PROTONIX) 40 MG tablet Take 1 tablet by mouth daily 3/11/16   Ar Automatic Reconciliation       Current medications:    Current Facility-Administered Medications   Medication Dose Route Frequency Provider Last Rate Last Admin    0.9 % sodium chloride infusion   IntraVENous Continuous Amaya Wynne MD        sodium chloride flush 0.9 % injection 5-40 mL  5-40 mL IntraVENous 2 times per

## 2023-06-07 NOTE — H&P
x 4    Diabetes Brother     Cancer Brother         prostate     Current Facility-Administered Medications   Medication Dose Route Frequency    0.9 % sodium chloride infusion   IntraVENous Continuous    sodium chloride flush 0.9 % injection 5-40 mL  5-40 mL IntraVENous 2 times per day    sodium chloride flush 0.9 % injection 5-40 mL  5-40 mL IntraVENous PRN    0.9 % sodium chloride infusion  25 mL IntraVENous PRN       PHYSICAL EXAM:    BP (!) 106/46   Pulse 58   Resp (!) 36   Ht 1.778 m (5' 10\")   Wt 89.8 kg (198 lb)   SpO2 98%   BMI 28.41 kg/m²     General: Alert, cooperative, no acute distress    HEENT: Atraumatic. PERRLA, EOMI. Anicteric sclerae. Lungs:  Comfortable breathing. No obvious use of accessory muscles. Not on oxygen  Abdomen: Soft, Non distended, Non tender. No hepatosplenomegaly  Extremities: No cyanosis or edema  Neurologic:  CN 2-12 grossly intact, Alert and oriented X 3. No acute neurological distress   Psych:   Good insight. Not anxious nor agitated. Assessment:   Esophageal stricture, dysphagia  Personal h/o colon polyps, s/p right hemicolectomy 06/2022.      Plan:   Endoscopic procedure: EGD and colonoscopy  Anesthesia plan: Zuleyma Alexander MD  9/3/42337:93 AM

## 2023-06-07 NOTE — DISCHARGE INSTRUCTIONS
[address]          [place] C[place]  [id number]  1950    EGD and COLONOSCOPY DISCHARGE INSTRUCTIONS    DISCOMFORT:  Redness at IV site- apply warm compress to area; if redness or soreness persist- contact your physician    Gaseous discomfort- walking, belching will help relieve any discomfort    DIET:   GERD and High Fiber diet         ACTIVITY:  You may resume your normal daily activities it is recommended that you spend the remainder of the day resting -  avoid any strenuous activity. You may not operate a vehicle for 12 hours  You may not engage in an occupation involving machinery or appliances for rest of today  You may not drink alcoholic beverages for at least 12 hours  Avoid making any critical decisions for at least 24 hour    CALL M.D. ANY SIGN OF:   Increasing pain, nausea, vomiting  Abdominal distension (swelling)  New increased bleeding (oral or rectal)  Fever (chills)  Pain in chest area  Bloody discharge from nose or mouth  Shortness of breath     Follow-up Instructions:   Call Dr. Chi Meade for any questions or problems. Telephone # [phone]  If a biopsy was taken, your results will be available in  5 to 7 days      Impression:      -5 cm sliding hiatal hernia with moderate gastropathy  -No obvious esophageal narrowing - empiric dilation with 54 Fr (18 mm) savary dilator.     -Intact and healthy appearing ileocolonic anastomosis  -3 small polyps, 5-7 mm, found in the transverse colon, removed and sent for pathology  -Mild sigmoid colon diverticulosis  -Medium sized internal hemorrhoids - likely source of intermittent bleeding    Recommendations:  -Resume normal medications. Resume coumadin June 9th.   -Continue acid suppression with pantoprazole   -A high fiber diet with plenty of fluids (up to 8 glasses of water daily) is suggested to relieve these symptoms.   Metamucil, 1 tablespoon once or twice daily can be used to keep

## 2025-01-01 NOTE — ANESTHESIA POSTPROCEDURE EVALUATION
Post-Anesthesia Evaluation and Assessment    Patient: Caitie Davis MRN: [id number]  SSN: [id number]    YOB: 1950  Age: 80 y.o. Sex: male      I have evaluated the patient and they are stable and ready for discharge from the PACU. Cardiovascular Function/Vital Signs  Visit Vitals  /73   Pulse (!) 59   Temp 37.3 °C (99.1 °F)   Resp 19   Ht 5' 10\" (1.778 m)   Wt 100.7 kg (222 lb)   SpO2 96%   BMI 31.85 kg/m²       Patient is status post MAC anesthesia for Procedure(s):  ESOPHAGOGASTRODUODENOSCOPY (EGD)    :-  ESOPHAGEAL DILATION  ESOPHAGOGASTRODUODENAL (EGD) BIOPSY. Nausea/Vomiting: None    Postoperative hydration reviewed and adequate. Pain:  Pain Scale 1: Numeric (0 - 10) (02/16/22 1126)  Pain Intensity 1: 0 (02/16/22 1126)   Managed    Neurological Status: At baseline    Mental Status, Level of Consciousness: Alert and  oriented to person, place, and time    Pulmonary Status:   O2 Device: Nasal cannula (02/16/22 1126)   Adequate oxygenation and airway patent    Complications related to anesthesia: None    Post-anesthesia assessment completed. No concerns    Signed By: Gerhardt Nanny, MD     February 16, 2022              Procedure(s):  ESOPHAGOGASTRODUODENOSCOPY (EGD)    :-  ESOPHAGEAL DILATION  ESOPHAGOGASTRODUODENAL (EGD) BIOPSY. MAC    <BSHSIANPOST>    INITIAL Post-op Vital signs:   Vitals Value Taken Time   /73 02/16/22 1135   Temp 37.3 °C (99.1 °F) 02/16/22 1125   Pulse 60 02/16/22 1137   Resp 28 02/16/22 1137   SpO2 97 % 02/16/22 1138   Vitals shown include unvalidated device data. Pt to the ED from home for hyperglycemia. Pt reports he ran out of his insulin last night and has not taken any today. Pt reports that he takes a basil insulin before meals and when his sugars are high. Pt reports increased thirst and urination, nausea, and headache. Pt is A&O x 4 and able to answer questions appropriately.

## (undated) DEVICE — ESOPHAGEAL DILATOR, 51 FRENCH: Brand: SAFEGUIDE®

## (undated) DEVICE — STAPLER INT L75MM H1.5X1.8X2MM STD TI 6 ROW LIN CUT

## (undated) DEVICE — KIT IV STRT W CHLORAPREP PD 1ML

## (undated) DEVICE — SOLUTION IRRIGATION H2O 0797305] ICU MEDICAL INC]

## (undated) DEVICE — SURGICAL PROCEDURE PACK BASIN MAJ SET CUST NO CAUT

## (undated) DEVICE — FORCEPS BX L240CM JAW DIA2.8MM L CAP W/ NDL MIC MESH TOOTH

## (undated) DEVICE — TOTAL TRAY, 16FR 10ML SIL FOLEY, URN: Brand: MEDLINE

## (undated) DEVICE — SOLIDIFIER FLUID 3000 CC ABSORB

## (undated) DEVICE — SUT VCRL + 2-0 27IN SH VIO --

## (undated) DEVICE — 1200 GUARD II KIT W/5MM TUBE W/O VAC TUBE: Brand: GUARDIAN

## (undated) DEVICE — SOLUTION IRRIG 3000ML 0.9% SOD CHL USP UROMATIC PLAS CONT

## (undated) DEVICE — GAUZE SPONGES,12 PLY: Brand: CURITY

## (undated) DEVICE — SNARE ENDOSCP POLYP 2.4 MM 240 CM 10 MM 2.8 MM CAPTIVATOR

## (undated) DEVICE — GENERAL LAPAROSCOPY - SMH: Brand: MEDLINE INDUSTRIES, INC.

## (undated) DEVICE — INTENDED FOR TISSUE SEPARATION, AND OTHER PROCEDURES THAT REQUIRE A SHARP SURGICAL BLADE TO PUNCTURE OR CUT.: Brand: BARD-PARKER ® CARBON RIB-BACK BLADES

## (undated) DEVICE — SPONGE LAP 18X18IN STRL -- 5/PK

## (undated) DEVICE — GUIDEWIRE WITH SPRING TIP - SINGLE USE: Brand: SAFEGUIDE®

## (undated) DEVICE — BLADE SAW W5.5XL25MM THK0.38MM CUT THK0.43MM REPL S STL SAG

## (undated) DEVICE — TROCAR: Brand: KII® OPTICAL ACCESS SYSTEM

## (undated) DEVICE — ARGYLE FRAZIER SURGICAL SUCTION INSTRUMENT 10 FR/CH (3.3 MM): Brand: ARGYLE

## (undated) DEVICE — 3M™ COBAN™ NL STERILE NON-LATEX SELF-ADHERENT WRAP, 2084S, 4 IN X 5 YD (10 CM X 4,5 M), 18 ROLLS/CASE: Brand: 3M™ COBAN™

## (undated) DEVICE — DISPOSABLE TOURNIQUET CUFF SINGLE BLADDER, DUAL PORT AND QUICK CONNECT CONNECTOR: Brand: COLOR CUFF

## (undated) DEVICE — RELOAD STPL L45MM H1-2.6MM MESENTERY THN TISS WHT GRIPPING

## (undated) DEVICE — SNARE ENDOSCP L240CM LOOP W27MM SHTH DIA2.4MM WRK CHN 2.8MM

## (undated) DEVICE — SUTURE MCRYL SZ 4-0 L27IN ABSRB UD L19MM PS-2 1/2 CIR PRIM Y426H

## (undated) DEVICE — ESOPHAGEAL DILATOR, 54 FRENCH: Brand: SAFEGUIDE®

## (undated) DEVICE — SYR 10ML LUER LOK 1/5ML GRAD --

## (undated) DEVICE — BAG BELONG PT PERS CLEAR HANDL

## (undated) DEVICE — CONNECTOR CATH URET SIDE PRT FOR FRIC CONN [UCA595] [GU TEK]

## (undated) DEVICE — SKIN TEMPERATURE SENSOR: Brand: DEROYAL

## (undated) DEVICE — SYRINGE MED 20ML STD CLR PLAS LUERLOCK TIP N CTRL DISP

## (undated) DEVICE — TUBING HYDR IRR --

## (undated) DEVICE — CATH IV AUTOGRD BC BLU 22GA 25 -- INSYTE

## (undated) DEVICE — SNARE ENDOSCP M L240CM W27MM SHTH DIA2.4MM CHN 2.8MM HEX

## (undated) DEVICE — BW-412T DISP COMBO CLEANING BRUSH: Brand: SINGLE USE COMBINATION CLEANING BRUSH

## (undated) DEVICE — REM POLYHESIVE ADULT PATIENT RETURN ELECTRODE: Brand: VALLEYLAB

## (undated) DEVICE — ROCKER SWITCH PENCIL BLADE ELECTRODE, HOLSTER: Brand: EDGE

## (undated) DEVICE — Device

## (undated) DEVICE — SEALER TISS L35CM DIA5MM CRV JAW ARTC ADV BPLR ENSEAL G2

## (undated) DEVICE — GEL SUBMUCOSAL LIFT AGNT -- ORISE

## (undated) DEVICE — Z DISCONTINUED NO SUB IDED SET EXTN W/ 4 W STPCOCK M SPIN LOK 36IN

## (undated) DEVICE — COVER LT HNDL BLU PLAS

## (undated) DEVICE — SUTURE VCRL SZ 3-0 L27IN ABSRB VLT L26MM SH 1/2 CIR J316H

## (undated) DEVICE — DILATOR ESOPH 51 FRX17 MM OVR THE GUIDEWIRE FLX SAFEGUIDE

## (undated) DEVICE — CATH URET 8FRX65CM OPN CONE --

## (undated) DEVICE — HOOK LOCK LATEX FREE ELASTIC BANDAGE 4INX5YD

## (undated) DEVICE — TOWEL SURG W17XL27IN STD BLU COT NONFENESTRATED PREWASHED

## (undated) DEVICE — CYSTO/BLADDER IRRIGATION SET, REGULATING CLAMP

## (undated) DEVICE — Device: Brand: SINGLE USE INJECTOR NM600/610

## (undated) DEVICE — 4-PORT MANIFOLD: Brand: NEPTUNE 2

## (undated) DEVICE — YANKAUER,POOLE TIP,STERILE,50/CS: Brand: MEDLINE

## (undated) DEVICE — SYSTEM EVAC SMOKE LAPARSCOPIC

## (undated) DEVICE — GLOVE SURG SZ 75 L1212IN FNGR THK138MIL BRN LTX FREE

## (undated) DEVICE — LAPAROSCOPIC TROCAR SLEEVE/SINGLE USE: Brand: KII® OPTICAL ACCESS SYSTEM

## (undated) DEVICE — NEEDLE HYPO 18GA L1.5IN PNK S STL HUB POLYPR SHLD REG BVL

## (undated) DEVICE — FCPS RAD JAW 4LC 240CM W/NDL -- BX/20 RADIAL JAW 4

## (undated) DEVICE — ACCESS PLATFORM FOR MINIMALLY INVASIVE SURGERY.: Brand: GELPORT® LAPAROSCOPIC  SYSTEM

## (undated) DEVICE — VISUALIZATION SYSTEM: Brand: CLEARIFY

## (undated) DEVICE — STERILE POLYISOPRENE POWDER-FREE SURGICAL GLOVES: Brand: PROTEXIS

## (undated) DEVICE — BASIC PACK: Brand: CONVERTORS

## (undated) DEVICE — STERILE-Z MAYO STAND COVERS CLEAR POLYETHYLENE STERILE UNIVERSAL FIT 20 PER CASE: Brand: STERILE-Z

## (undated) DEVICE — SUT ETHLN 3-0 18IN PS2 BLK --

## (undated) DEVICE — OCCLUSIVE GAUZE STRIP,3% BISMUTH TRIBROMOPHENATE IN PETROLATUM BLEND: Brand: XEROFORM

## (undated) DEVICE — ENDO CARRY-ON PROCEDURE KIT INCLUDES ENZYMATIC SPONGE, GAUZE, BIOHAZARD LABEL, TRAY, LUBRICANT, DIRTY SCOPE LABEL, WATER LABEL, TRAY, DRAWSTRING PAD, AND DEFENDO 4-PIECE KIT.: Brand: ENDO CARRY-ON PROCEDURE KIT

## (undated) DEVICE — SUTURE VCRL SZ 3-0 L27IN ABSRB UD L26MM SH 1/2 CIR J416H

## (undated) DEVICE — SMALL OSC. SAW BLADE, 9MM X 18.5MM X 0.38MM: Brand: MICROAIRE®

## (undated) DEVICE — SUTURE ABSORBABLE MONOFILAMENT 1-0 CT1 27 IN VIO PDS + PDP341H

## (undated) DEVICE — SHEET,DRAPE,UNDERBUTTOCK,GRAD POUCH,PORT: Brand: MEDLINE

## (undated) DEVICE — SUTURE PERMAHAND SZ 2-0 L18IN NONABSORBABLE BLK L26MM SH C012D

## (undated) DEVICE — BANDAGE COMPR 9 FTX4 IN SMOOTH COMFORTABLE SYNTH ESMRK LF

## (undated) DEVICE — SUTURE PERMAHAND SZ 3-0 L18IN NONABSORBABLE BLK L26MM SH C013D

## (undated) DEVICE — NEONATAL-ADULT SPO2 SENSOR: Brand: NELLCOR

## (undated) DEVICE — SET ADMIN 16ML TBNG L100IN 2 Y INJ SITE IV PIGGY BK DISP

## (undated) DEVICE — DRAPE,EXTREMITY,89X128,STERILE: Brand: MEDLINE

## (undated) DEVICE — HYPODERMIC SAFETY NEEDLE: Brand: MAGELLAN

## (undated) DEVICE — ESOPHAGEAL DILATOR, 48 FRENCH: Brand: SAFEGUIDE®

## (undated) DEVICE — COLON CLOSING PACK: Brand: MEDLINE INDUSTRIES, INC.

## (undated) DEVICE — RELOAD STPL L100MM OPN H3.8MM CLS H1.5MM WIRE DIA0.2MM BLU

## (undated) DEVICE — DILATOR ES 54FR 18MM OVR THE GWIRE FLX SAFEGUIDE

## (undated) DEVICE — NEEDLE HYPO 25GA L1.5IN BVL ORIENTED ECLIPSE

## (undated) DEVICE — DERMABOND SKIN ADH 0.7ML -- DERMABOND ADVANCED 12/BX

## (undated) DEVICE — SUT MONOCRYL PLUS UD 4-0 --

## (undated) DEVICE — SUTURE V-LOC 90 3-0 L9IN ABSRB VLT L26MM V-20 1/2 CIR TAPR VLOCM0644

## (undated) DEVICE — WRAP SURG W1.31XL1.34M CARD FOR PT 165-172CM THERMOWRP

## (undated) DEVICE — CANN NASAL O2 CAPNOGRAPHY AD -- FILTERLINE

## (undated) DEVICE — CYSTO-SMH: Brand: MEDLINE INDUSTRIES, INC.

## (undated) DEVICE — DRAPE C ARM W54XL84IN MINI FOR OEC 6800

## (undated) DEVICE — SNARE POLYP SM AD W13MMXL240CM SHTH DIA2.4MM HEX STIFF

## (undated) DEVICE — LIGHT HANDLE: Brand: DEVON

## (undated) DEVICE — Device: Brand: DISPOSABLE ELECTROSURGICAL SNARE

## (undated) DEVICE — STRETCH BANDAGE ROLL: Brand: DERMACEA

## (undated) DEVICE — DRAPE SHT 3 QTR PROXIMA 53X77 --

## (undated) DEVICE — YANKAUER,BULB TIP,W/O VENT,RIGID,STERILE: Brand: MEDLINE

## (undated) DEVICE — KENDALL RADIOLUCENT FOAM MONITORING ELECTRODE -RECTANGULAR SHAPE: Brand: KENDALL

## (undated) DEVICE — SOLUTION IV 1000ML 0.9% SOD CHL

## (undated) DEVICE — STAPLER INT L34CM 60MM LNG ENDOSCP ARTC PWR + ECHELON FLX

## (undated) DEVICE — PENCIL SMK EVAC 10 FT BLADE ELECTRD ROCKER FOR TELSCP

## (undated) DEVICE — GARMENT,MEDLINE,DVT,INT,CALF,MED, GEN2: Brand: MEDLINE

## (undated) DEVICE — (D)SYR 10ML 1/5ML GRAD NSAF -- PKGING CHANGE USE ITEM 338027

## (undated) DEVICE — BITE BLK ENDOSCP AD 54FR GRN POLYETH ENDOSCP W STRP SLD

## (undated) DEVICE — (D)PREP SKN CHLRAPRP APPL 26ML -- CONVERT TO ITEM 371833

## (undated) DEVICE — TRAP SURG QUAD PARABOLA SLOT DSGN SFTY SCRN TRAPEASE